# Patient Record
Sex: FEMALE | Race: WHITE | NOT HISPANIC OR LATINO | URBAN - NONMETROPOLITAN AREA
[De-identification: names, ages, dates, MRNs, and addresses within clinical notes are randomized per-mention and may not be internally consistent; named-entity substitution may affect disease eponyms.]

---

## 2021-12-08 PROCEDURE — U0004 COV-19 TEST NON-CDC HGH THRU: HCPCS | Performed by: NURSE PRACTITIONER

## 2022-02-07 ENCOUNTER — HOSPITAL ENCOUNTER (INPATIENT)
Facility: HOSPITAL | Age: 27
LOS: 30 days | Discharge: PSYCHIATRIC OR SUBSTANCE USE FACILITY | End: 2022-03-09
Attending: PSYCHIATRY & NEUROLOGY | Admitting: PSYCHIATRY & NEUROLOGY

## 2022-02-07 ENCOUNTER — HOSPITAL ENCOUNTER (EMERGENCY)
Facility: HOSPITAL | Age: 27
Discharge: PSYCHIATRIC OR SUBSTANCE USE FACILITY | End: 2022-02-07
Attending: EMERGENCY MEDICINE | Admitting: EMERGENCY MEDICINE

## 2022-02-07 VITALS
HEIGHT: 66 IN | BODY MASS INDEX: 17.72 KG/M2 | DIASTOLIC BLOOD PRESSURE: 78 MMHG | OXYGEN SATURATION: 99 % | SYSTOLIC BLOOD PRESSURE: 122 MMHG | WEIGHT: 110.23 LBS | TEMPERATURE: 98.6 F | HEART RATE: 82 BPM | RESPIRATION RATE: 18 BRPM

## 2022-02-07 DIAGNOSIS — F50.00 ANOREXIA NERVOSA: ICD-10-CM

## 2022-02-07 DIAGNOSIS — F25.1 SCHIZOAFFECTIVE DISORDER, DEPRESSIVE TYPE: Primary | ICD-10-CM

## 2022-02-07 DIAGNOSIS — R45.851 SUICIDAL IDEATION: Primary | ICD-10-CM

## 2022-02-07 PROBLEM — F32.A DEPRESSION WITH SUICIDAL IDEATION: Status: ACTIVE | Noted: 2022-02-07

## 2022-02-07 LAB
ALBUMIN SERPL-MCNC: 4.96 G/DL (ref 3.5–5.2)
ALBUMIN/GLOB SERPL: 1.7 G/DL
ALP SERPL-CCNC: 101 U/L (ref 39–117)
ALT SERPL W P-5'-P-CCNC: 9 U/L (ref 1–33)
ANION GAP SERPL CALCULATED.3IONS-SCNC: 13.4 MMOL/L (ref 5–15)
AST SERPL-CCNC: 15 U/L (ref 1–32)
BASOPHILS # BLD AUTO: 0.08 10*3/MM3 (ref 0–0.2)
BASOPHILS NFR BLD AUTO: 0.9 % (ref 0–1.5)
BILIRUB SERPL-MCNC: 0.9 MG/DL (ref 0–1.2)
BUN SERPL-MCNC: 9 MG/DL (ref 6–20)
BUN/CREAT SERPL: 11.8 (ref 7–25)
CALCIUM SPEC-SCNC: 9.9 MG/DL (ref 8.6–10.5)
CHLORIDE SERPL-SCNC: 100 MMOL/L (ref 98–107)
CO2 SERPL-SCNC: 25.6 MMOL/L (ref 22–29)
CREAT SERPL-MCNC: 0.76 MG/DL (ref 0.57–1)
DEPRECATED RDW RBC AUTO: 44.4 FL (ref 37–54)
EOSINOPHIL # BLD AUTO: 0.05 10*3/MM3 (ref 0–0.4)
EOSINOPHIL NFR BLD AUTO: 0.6 % (ref 0.3–6.2)
ERYTHROCYTE [DISTWIDTH] IN BLOOD BY AUTOMATED COUNT: 13.2 % (ref 12.3–15.4)
ETHANOL BLD-MCNC: <10 MG/DL (ref 0–10)
ETHANOL UR QL: <0.01 %
FLUAV RNA RESP QL NAA+PROBE: NOT DETECTED
FLUBV RNA RESP QL NAA+PROBE: NOT DETECTED
GFR SERPL CREATININE-BSD FRML MDRD: 93 ML/MIN/1.73
GLOBULIN UR ELPH-MCNC: 2.8 GM/DL
GLUCOSE SERPL-MCNC: 97 MG/DL (ref 65–99)
HCG SERPL QL: NEGATIVE
HCT VFR BLD AUTO: 41.9 % (ref 34–46.6)
HGB BLD-MCNC: 13.6 G/DL (ref 12–15.9)
IMM GRANULOCYTES # BLD AUTO: 0.02 10*3/MM3 (ref 0–0.05)
IMM GRANULOCYTES NFR BLD AUTO: 0.2 % (ref 0–0.5)
LYMPHOCYTES # BLD AUTO: 2.16 10*3/MM3 (ref 0.7–3.1)
LYMPHOCYTES NFR BLD AUTO: 24.2 % (ref 19.6–45.3)
MAGNESIUM SERPL-MCNC: 2.1 MG/DL (ref 1.6–2.6)
MCH RBC QN AUTO: 29.9 PG (ref 26.6–33)
MCHC RBC AUTO-ENTMCNC: 32.5 G/DL (ref 31.5–35.7)
MCV RBC AUTO: 92.1 FL (ref 79–97)
MONOCYTES # BLD AUTO: 0.56 10*3/MM3 (ref 0.1–0.9)
MONOCYTES NFR BLD AUTO: 6.3 % (ref 5–12)
NEUTROPHILS NFR BLD AUTO: 6.07 10*3/MM3 (ref 1.7–7)
NEUTROPHILS NFR BLD AUTO: 67.8 % (ref 42.7–76)
NRBC BLD AUTO-RTO: 0 /100 WBC (ref 0–0.2)
PLATELET # BLD AUTO: 270 10*3/MM3 (ref 140–450)
PMV BLD AUTO: 12.6 FL (ref 6–12)
POTASSIUM SERPL-SCNC: 3.9 MMOL/L (ref 3.5–5.2)
PROT SERPL-MCNC: 7.8 G/DL (ref 6–8.5)
RBC # BLD AUTO: 4.55 10*6/MM3 (ref 3.77–5.28)
SARS-COV-2 RNA RESP QL NAA+PROBE: NOT DETECTED
SODIUM SERPL-SCNC: 139 MMOL/L (ref 136–145)
WBC NRBC COR # BLD: 8.94 10*3/MM3 (ref 3.4–10.8)

## 2022-02-07 PROCEDURE — 82077 ASSAY SPEC XCP UR&BREATH IA: CPT | Performed by: PHYSICIAN ASSISTANT

## 2022-02-07 PROCEDURE — 83735 ASSAY OF MAGNESIUM: CPT | Performed by: PHYSICIAN ASSISTANT

## 2022-02-07 PROCEDURE — 36415 COLL VENOUS BLD VENIPUNCTURE: CPT

## 2022-02-07 PROCEDURE — 93005 ELECTROCARDIOGRAM TRACING: CPT | Performed by: PSYCHIATRY & NEUROLOGY

## 2022-02-07 PROCEDURE — 87636 SARSCOV2 & INF A&B AMP PRB: CPT | Performed by: PHYSICIAN ASSISTANT

## 2022-02-07 PROCEDURE — 85025 COMPLETE CBC W/AUTO DIFF WBC: CPT | Performed by: PHYSICIAN ASSISTANT

## 2022-02-07 PROCEDURE — 99284 EMERGENCY DEPT VISIT MOD MDM: CPT

## 2022-02-07 PROCEDURE — 84703 CHORIONIC GONADOTROPIN ASSAY: CPT | Performed by: PSYCHIATRY & NEUROLOGY

## 2022-02-07 PROCEDURE — C9803 HOPD COVID-19 SPEC COLLECT: HCPCS

## 2022-02-07 PROCEDURE — 80053 COMPREHEN METABOLIC PANEL: CPT | Performed by: PHYSICIAN ASSISTANT

## 2022-02-07 RX ORDER — TRAZODONE HYDROCHLORIDE 50 MG/1
50 TABLET ORAL NIGHTLY PRN
Status: DISCONTINUED | OUTPATIENT
Start: 2022-02-07 | End: 2022-03-09 | Stop reason: HOSPADM

## 2022-02-07 RX ORDER — ACETAMINOPHEN 325 MG/1
650 TABLET ORAL EVERY 6 HOURS PRN
Status: DISCONTINUED | OUTPATIENT
Start: 2022-02-07 | End: 2022-03-09 | Stop reason: HOSPADM

## 2022-02-07 RX ORDER — BENZONATATE 100 MG/1
100 CAPSULE ORAL 3 TIMES DAILY PRN
Status: DISCONTINUED | OUTPATIENT
Start: 2022-02-07 | End: 2022-03-09 | Stop reason: HOSPADM

## 2022-02-07 RX ORDER — BENZTROPINE MESYLATE 1 MG/1
2 TABLET ORAL ONCE AS NEEDED
Status: DISCONTINUED | OUTPATIENT
Start: 2022-02-07 | End: 2022-03-09 | Stop reason: HOSPADM

## 2022-02-07 RX ORDER — LOPERAMIDE HYDROCHLORIDE 2 MG/1
2 CAPSULE ORAL
Status: DISCONTINUED | OUTPATIENT
Start: 2022-02-07 | End: 2022-03-09 | Stop reason: HOSPADM

## 2022-02-07 RX ORDER — ECHINACEA PURPUREA EXTRACT 125 MG
2 TABLET ORAL AS NEEDED
Status: DISCONTINUED | OUTPATIENT
Start: 2022-02-07 | End: 2022-03-09 | Stop reason: HOSPADM

## 2022-02-07 RX ORDER — HYDROXYZINE 50 MG/1
50 TABLET, FILM COATED ORAL EVERY 6 HOURS PRN
Status: DISCONTINUED | OUTPATIENT
Start: 2022-02-07 | End: 2022-03-09 | Stop reason: HOSPADM

## 2022-02-07 RX ORDER — ONDANSETRON 4 MG/1
4 TABLET, FILM COATED ORAL EVERY 6 HOURS PRN
Status: DISCONTINUED | OUTPATIENT
Start: 2022-02-07 | End: 2022-03-09 | Stop reason: HOSPADM

## 2022-02-07 RX ORDER — BENZTROPINE MESYLATE 1 MG/ML
1 INJECTION INTRAMUSCULAR; INTRAVENOUS ONCE AS NEEDED
Status: DISCONTINUED | OUTPATIENT
Start: 2022-02-07 | End: 2022-03-09 | Stop reason: HOSPADM

## 2022-02-07 RX ORDER — IBUPROFEN 400 MG/1
400 TABLET ORAL EVERY 6 HOURS PRN
Status: DISCONTINUED | OUTPATIENT
Start: 2022-02-07 | End: 2022-03-09 | Stop reason: HOSPADM

## 2022-02-07 RX ORDER — FAMOTIDINE 20 MG/1
20 TABLET, FILM COATED ORAL 2 TIMES DAILY PRN
Status: DISCONTINUED | OUTPATIENT
Start: 2022-02-07 | End: 2022-03-09 | Stop reason: HOSPADM

## 2022-02-07 RX ORDER — ALUMINA, MAGNESIA, AND SIMETHICONE 2400; 2400; 240 MG/30ML; MG/30ML; MG/30ML
15 SUSPENSION ORAL EVERY 6 HOURS PRN
Status: DISCONTINUED | OUTPATIENT
Start: 2022-02-07 | End: 2022-03-09 | Stop reason: HOSPADM

## 2022-02-08 PROBLEM — F25.1 SCHIZOAFFECTIVE DISORDER, DEPRESSIVE TYPE: Status: ACTIVE | Noted: 2022-02-07

## 2022-02-08 LAB — TSH SERPL DL<=0.05 MIU/L-ACNC: 0.62 UIU/ML (ref 0.27–4.2)

## 2022-02-08 PROCEDURE — 63710000001 ONDANSETRON PER 8 MG: Performed by: PSYCHIATRY & NEUROLOGY

## 2022-02-08 PROCEDURE — 99223 1ST HOSP IP/OBS HIGH 75: CPT | Performed by: PSYCHIATRY & NEUROLOGY

## 2022-02-08 PROCEDURE — 84443 ASSAY THYROID STIM HORMONE: CPT | Performed by: PSYCHIATRY & NEUROLOGY

## 2022-02-08 RX ORDER — OLANZAPINE 10 MG/1
10 TABLET ORAL DAILY
Status: DISCONTINUED | OUTPATIENT
Start: 2022-02-08 | End: 2022-02-10

## 2022-02-08 RX ORDER — OLANZAPINE 5 MG/1
10 TABLET, ORALLY DISINTEGRATING ORAL DAILY
Status: DISCONTINUED | OUTPATIENT
Start: 2022-02-08 | End: 2022-02-08

## 2022-02-08 RX ORDER — FLUOXETINE HYDROCHLORIDE 20 MG/1
20 CAPSULE ORAL DAILY
Status: DISCONTINUED | OUTPATIENT
Start: 2022-02-08 | End: 2022-03-07

## 2022-02-08 RX ADMIN — FLUOXETINE HYDROCHLORIDE 20 MG: 20 CAPSULE ORAL at 13:20

## 2022-02-09 PROCEDURE — 99232 SBSQ HOSP IP/OBS MODERATE 35: CPT | Performed by: PSYCHIATRY & NEUROLOGY

## 2022-02-10 LAB
AMPHET+METHAMPHET UR QL: NEGATIVE
AMPHETAMINES UR QL: NEGATIVE
B-HCG UR QL: NEGATIVE
BARBITURATES UR QL SCN: NEGATIVE
BENZODIAZ UR QL SCN: NEGATIVE
BILIRUB UR QL STRIP: ABNORMAL
BUPRENORPHINE SERPL-MCNC: NEGATIVE NG/ML
CANNABINOIDS SERPL QL: NEGATIVE
CLARITY UR: ABNORMAL
COCAINE UR QL: NEGATIVE
COLOR UR: ABNORMAL
GLUCOSE UR STRIP-MCNC: NEGATIVE MG/DL
HGB UR QL STRIP.AUTO: NEGATIVE
KETONES UR QL STRIP: ABNORMAL
LEUKOCYTE ESTERASE UR QL STRIP.AUTO: NEGATIVE
METHADONE UR QL SCN: NEGATIVE
NITRITE UR QL STRIP: NEGATIVE
OPIATES UR QL: NEGATIVE
OXYCODONE UR QL SCN: NEGATIVE
PCP UR QL SCN: NEGATIVE
PH UR STRIP.AUTO: 6 [PH] (ref 5–8)
PROPOXYPH UR QL: NEGATIVE
PROT UR QL STRIP: ABNORMAL
SP GR UR STRIP: >=1.03 (ref 1–1.03)
TRICYCLICS UR QL SCN: NEGATIVE
UROBILINOGEN UR QL STRIP: ABNORMAL

## 2022-02-10 PROCEDURE — 81003 URINALYSIS AUTO W/O SCOPE: CPT | Performed by: PSYCHIATRY & NEUROLOGY

## 2022-02-10 PROCEDURE — 25010000002 LORAZEPAM PER 2 MG

## 2022-02-10 PROCEDURE — 99233 SBSQ HOSP IP/OBS HIGH 50: CPT | Performed by: PSYCHIATRY & NEUROLOGY

## 2022-02-10 PROCEDURE — 81025 URINE PREGNANCY TEST: CPT | Performed by: PSYCHIATRY & NEUROLOGY

## 2022-02-10 PROCEDURE — 80306 DRUG TEST PRSMV INSTRMNT: CPT | Performed by: PSYCHIATRY & NEUROLOGY

## 2022-02-10 RX ORDER — LORAZEPAM 2 MG/ML
INJECTION INTRAMUSCULAR
Status: COMPLETED
Start: 2022-02-10 | End: 2022-02-10

## 2022-02-10 RX ORDER — LORAZEPAM 2 MG/ML
1 INJECTION INTRAMUSCULAR ONCE
Status: COMPLETED | OUTPATIENT
Start: 2022-02-10 | End: 2022-02-10

## 2022-02-10 RX ORDER — ARIPIPRAZOLE 10 MG/1
5 TABLET ORAL DAILY
Status: DISCONTINUED | OUTPATIENT
Start: 2022-02-10 | End: 2022-02-19

## 2022-02-10 RX ADMIN — FLUOXETINE HYDROCHLORIDE 20 MG: 20 CAPSULE ORAL at 17:22

## 2022-02-10 RX ADMIN — LORAZEPAM 1 MG: 2 INJECTION INTRAMUSCULAR at 13:54

## 2022-02-10 RX ADMIN — ARIPIPRAZOLE 5 MG: 10 TABLET ORAL at 17:23

## 2022-02-10 RX ADMIN — LORAZEPAM 1 MG: 2 INJECTION, SOLUTION INTRAMUSCULAR; INTRAVENOUS at 13:54

## 2022-02-11 PROCEDURE — 99232 SBSQ HOSP IP/OBS MODERATE 35: CPT | Performed by: PSYCHIATRY & NEUROLOGY

## 2022-02-11 PROCEDURE — 25010000002 LORAZEPAM PER 2 MG: Performed by: PSYCHIATRY & NEUROLOGY

## 2022-02-11 RX ORDER — LORAZEPAM 0.5 MG/1
0.5 TABLET ORAL DAILY PRN
Status: DISCONTINUED | OUTPATIENT
Start: 2022-02-11 | End: 2022-02-12

## 2022-02-11 RX ORDER — LORAZEPAM 2 MG/ML
0.5 INJECTION INTRAMUSCULAR ONCE
Status: DISCONTINUED | OUTPATIENT
Start: 2022-02-11 | End: 2022-02-16

## 2022-02-11 RX ORDER — LORAZEPAM 2 MG/ML
0.5 INJECTION INTRAMUSCULAR DAILY PRN
Status: DISCONTINUED | OUTPATIENT
Start: 2022-02-11 | End: 2022-02-12

## 2022-02-11 RX ADMIN — LORAZEPAM 0.5 MG: 2 INJECTION, SOLUTION INTRAMUSCULAR; INTRAVENOUS at 22:19

## 2022-02-12 LAB
ANION GAP SERPL CALCULATED.3IONS-SCNC: 12.2 MMOL/L (ref 5–15)
BUN SERPL-MCNC: 22 MG/DL (ref 6–20)
BUN/CREAT SERPL: 24.4 (ref 7–25)
CALCIUM SPEC-SCNC: 9.4 MG/DL (ref 8.6–10.5)
CHLORIDE SERPL-SCNC: 104 MMOL/L (ref 98–107)
CO2 SERPL-SCNC: 26.8 MMOL/L (ref 22–29)
CREAT SERPL-MCNC: 0.9 MG/DL (ref 0.57–1)
GFR SERPL CREATININE-BSD FRML MDRD: 77 ML/MIN/1.73
GLUCOSE SERPL-MCNC: 109 MG/DL (ref 65–99)
POTASSIUM SERPL-SCNC: 3.8 MMOL/L (ref 3.5–5.2)
QT INTERVAL: 396 MS
QTC INTERVAL: 436 MS
SODIUM SERPL-SCNC: 143 MMOL/L (ref 136–145)

## 2022-02-12 PROCEDURE — 99232 SBSQ HOSP IP/OBS MODERATE 35: CPT | Performed by: PSYCHIATRY & NEUROLOGY

## 2022-02-12 PROCEDURE — 80048 BASIC METABOLIC PNL TOTAL CA: CPT | Performed by: PSYCHIATRY & NEUROLOGY

## 2022-02-12 RX ORDER — LORAZEPAM 2 MG/ML
0.5 INJECTION INTRAMUSCULAR EVERY 6 HOURS
Status: DISCONTINUED | OUTPATIENT
Start: 2022-02-12 | End: 2022-02-12

## 2022-02-12 RX ORDER — LORAZEPAM 1 MG/1
1 TABLET ORAL EVERY 6 HOURS
Status: DISCONTINUED | OUTPATIENT
Start: 2022-02-12 | End: 2022-02-16

## 2022-02-12 RX ORDER — LORAZEPAM 1 MG/1
1 TABLET ORAL EVERY 6 HOURS
Status: DISCONTINUED | OUTPATIENT
Start: 2022-02-12 | End: 2022-02-12

## 2022-02-12 RX ORDER — LORAZEPAM 2 MG/ML
1 INJECTION INTRAMUSCULAR EVERY 6 HOURS
Status: DISCONTINUED | OUTPATIENT
Start: 2022-02-12 | End: 2022-02-16

## 2022-02-12 RX ADMIN — LORAZEPAM 1 MG: 1 TABLET ORAL at 16:43

## 2022-02-13 PROCEDURE — 99232 SBSQ HOSP IP/OBS MODERATE 35: CPT | Performed by: PSYCHIATRY & NEUROLOGY

## 2022-02-13 PROCEDURE — 99222 1ST HOSP IP/OBS MODERATE 55: CPT | Performed by: INTERNAL MEDICINE

## 2022-02-13 RX ORDER — SODIUM CHLORIDE 0.9 % (FLUSH) 0.9 %
10 SYRINGE (ML) INJECTION AS NEEDED
Status: DISCONTINUED | OUTPATIENT
Start: 2022-02-13 | End: 2022-03-09 | Stop reason: HOSPADM

## 2022-02-14 PROCEDURE — 99231 SBSQ HOSP IP/OBS SF/LOW 25: CPT | Performed by: INTERNAL MEDICINE

## 2022-02-14 PROCEDURE — 99233 SBSQ HOSP IP/OBS HIGH 50: CPT | Performed by: PSYCHIATRY & NEUROLOGY

## 2022-02-15 PROCEDURE — 25010000002 LORAZEPAM PER 2 MG: Performed by: PSYCHIATRY & NEUROLOGY

## 2022-02-15 PROCEDURE — 99233 SBSQ HOSP IP/OBS HIGH 50: CPT | Performed by: PSYCHIATRY & NEUROLOGY

## 2022-02-15 RX ADMIN — LORAZEPAM 1 MG: 2 INJECTION INTRAMUSCULAR; INTRAVENOUS at 17:01

## 2022-02-16 PROBLEM — F33.2 SEVERE EPISODE OF RECURRENT MAJOR DEPRESSIVE DISORDER, WITHOUT PSYCHOTIC FEATURES: Status: ACTIVE | Noted: 2022-02-16

## 2022-02-16 PROBLEM — F50.00 ANOREXIA NERVOSA: Status: ACTIVE | Noted: 2022-02-16

## 2022-02-16 PROCEDURE — 99231 SBSQ HOSP IP/OBS SF/LOW 25: CPT | Performed by: PHYSICIAN ASSISTANT

## 2022-02-16 PROCEDURE — 99233 SBSQ HOSP IP/OBS HIGH 50: CPT | Performed by: PSYCHIATRY & NEUROLOGY

## 2022-02-16 RX ORDER — LORAZEPAM 2 MG/1
2 TABLET ORAL DAILY
Status: DISCONTINUED | OUTPATIENT
Start: 2022-02-17 | End: 2022-02-18

## 2022-02-16 RX ADMIN — ARIPIPRAZOLE 5 MG: 10 TABLET ORAL at 10:44

## 2022-02-16 RX ADMIN — LORAZEPAM 1 MG: 1 TABLET ORAL at 09:11

## 2022-02-16 RX ADMIN — FLUOXETINE HYDROCHLORIDE 20 MG: 20 CAPSULE ORAL at 10:44

## 2022-02-17 PROCEDURE — 99233 SBSQ HOSP IP/OBS HIGH 50: CPT | Performed by: PSYCHIATRY & NEUROLOGY

## 2022-02-17 RX ADMIN — ARIPIPRAZOLE 5 MG: 10 TABLET ORAL at 08:08

## 2022-02-17 RX ADMIN — LORAZEPAM 2 MG: 2 TABLET ORAL at 08:08

## 2022-02-17 RX ADMIN — FLUOXETINE HYDROCHLORIDE 20 MG: 20 CAPSULE ORAL at 08:08

## 2022-02-18 PROCEDURE — 99232 SBSQ HOSP IP/OBS MODERATE 35: CPT | Performed by: PSYCHIATRY & NEUROLOGY

## 2022-02-18 RX ADMIN — FLUOXETINE HYDROCHLORIDE 20 MG: 20 CAPSULE ORAL at 09:18

## 2022-02-18 RX ADMIN — ARIPIPRAZOLE 5 MG: 10 TABLET ORAL at 09:18

## 2022-02-19 PROCEDURE — 99233 SBSQ HOSP IP/OBS HIGH 50: CPT | Performed by: PSYCHIATRY & NEUROLOGY

## 2022-02-19 RX ORDER — ARIPIPRAZOLE 10 MG/1
5 TABLET ORAL ONCE
Status: DISCONTINUED | OUTPATIENT
Start: 2022-02-19 | End: 2022-02-22

## 2022-02-19 RX ORDER — ARIPIPRAZOLE 10 MG/1
10 TABLET ORAL DAILY
Status: DISCONTINUED | OUTPATIENT
Start: 2022-02-20 | End: 2022-02-22

## 2022-02-20 PROCEDURE — 99232 SBSQ HOSP IP/OBS MODERATE 35: CPT | Performed by: PSYCHIATRY & NEUROLOGY

## 2022-02-20 PROCEDURE — 25010000002 LORAZEPAM PER 2 MG: Performed by: PSYCHIATRY & NEUROLOGY

## 2022-02-20 RX ORDER — LORAZEPAM 2 MG/ML
2 INJECTION INTRAMUSCULAR ONCE
Status: COMPLETED | OUTPATIENT
Start: 2022-02-20 | End: 2022-02-20

## 2022-02-20 RX ORDER — LORAZEPAM 2 MG/1
2 TABLET ORAL ONCE
Status: DISCONTINUED | OUTPATIENT
Start: 2022-02-20 | End: 2022-02-21

## 2022-02-20 RX ADMIN — LORAZEPAM 2 MG: 2 INJECTION INTRAMUSCULAR; INTRAVENOUS at 15:51

## 2022-02-21 PROCEDURE — 99232 SBSQ HOSP IP/OBS MODERATE 35: CPT | Performed by: PSYCHIATRY & NEUROLOGY

## 2022-02-21 RX ORDER — LORAZEPAM 2 MG/1
2 TABLET ORAL EVERY 8 HOURS SCHEDULED
Status: DISCONTINUED | OUTPATIENT
Start: 2022-02-21 | End: 2022-02-22

## 2022-02-21 RX ORDER — LORAZEPAM 2 MG/1
2 TABLET ORAL ONCE AS NEEDED
Status: DISCONTINUED | OUTPATIENT
Start: 2022-02-21 | End: 2022-02-22

## 2022-02-21 RX ADMIN — LORAZEPAM 2 MG: 2 TABLET ORAL at 13:34

## 2022-02-21 RX ADMIN — FLUOXETINE HYDROCHLORIDE 20 MG: 20 CAPSULE ORAL at 09:12

## 2022-02-21 RX ADMIN — LORAZEPAM 2 MG: 2 TABLET ORAL at 21:07

## 2022-02-22 PROCEDURE — 90836 PSYTX W PT W E/M 45 MIN: CPT | Performed by: PSYCHIATRY & NEUROLOGY

## 2022-02-22 PROCEDURE — 99232 SBSQ HOSP IP/OBS MODERATE 35: CPT | Performed by: PSYCHIATRY & NEUROLOGY

## 2022-02-22 RX ORDER — LORAZEPAM 2 MG/1
2 TABLET ORAL EVERY 8 HOURS PRN
Status: DISCONTINUED | OUTPATIENT
Start: 2022-02-22 | End: 2022-02-24

## 2022-02-22 RX ADMIN — ARIPIPRAZOLE 10 MG: 10 TABLET ORAL at 09:18

## 2022-02-22 RX ADMIN — FLUOXETINE HYDROCHLORIDE 20 MG: 20 CAPSULE ORAL at 09:18

## 2022-02-22 RX ADMIN — LORAZEPAM 2 MG: 2 TABLET ORAL at 06:14

## 2022-02-23 PROCEDURE — 99232 SBSQ HOSP IP/OBS MODERATE 35: CPT | Performed by: PSYCHIATRY & NEUROLOGY

## 2022-02-23 RX ADMIN — FLUOXETINE HYDROCHLORIDE 20 MG: 20 CAPSULE ORAL at 09:13

## 2022-02-24 PROCEDURE — 99232 SBSQ HOSP IP/OBS MODERATE 35: CPT | Performed by: PSYCHIATRY & NEUROLOGY

## 2022-02-24 PROCEDURE — 25010000002 LORAZEPAM PER 2 MG: Performed by: PSYCHIATRY & NEUROLOGY

## 2022-02-24 RX ORDER — LORAZEPAM 2 MG/ML
2 INJECTION INTRAMUSCULAR ONCE
Status: COMPLETED | OUTPATIENT
Start: 2022-02-24 | End: 2022-02-24

## 2022-02-24 RX ORDER — LORAZEPAM 2 MG/1
2 TABLET ORAL 3 TIMES DAILY
Status: DISCONTINUED | OUTPATIENT
Start: 2022-02-24 | End: 2022-03-01

## 2022-02-24 RX ADMIN — LORAZEPAM 2 MG: 2 INJECTION INTRAMUSCULAR at 13:37

## 2022-02-24 RX ADMIN — FLUOXETINE HYDROCHLORIDE 20 MG: 20 CAPSULE ORAL at 09:24

## 2022-02-25 LAB
ALBUMIN SERPL-MCNC: 4.16 G/DL (ref 3.5–5.2)
ALBUMIN/GLOB SERPL: 2 G/DL
ALP SERPL-CCNC: 67 U/L (ref 39–117)
ALT SERPL W P-5'-P-CCNC: 8 U/L (ref 1–33)
ANION GAP SERPL CALCULATED.3IONS-SCNC: 10.8 MMOL/L (ref 5–15)
AST SERPL-CCNC: 21 U/L (ref 1–32)
BILIRUB SERPL-MCNC: 0.4 MG/DL (ref 0–1.2)
BUN SERPL-MCNC: 11 MG/DL (ref 6–20)
BUN/CREAT SERPL: 13.6 (ref 7–25)
CALCIUM SPEC-SCNC: 8.7 MG/DL (ref 8.6–10.5)
CHLORIDE SERPL-SCNC: 104 MMOL/L (ref 98–107)
CO2 SERPL-SCNC: 27.2 MMOL/L (ref 22–29)
CREAT SERPL-MCNC: 0.81 MG/DL (ref 0.57–1)
GFR SERPL CREATININE-BSD FRML MDRD: 87 ML/MIN/1.73
GLOBULIN UR ELPH-MCNC: 2 GM/DL
GLUCOSE SERPL-MCNC: 110 MG/DL (ref 65–99)
PHOSPHATE SERPL-MCNC: 2.2 MG/DL (ref 2.5–4.5)
POTASSIUM SERPL-SCNC: 3.5 MMOL/L (ref 3.5–5.2)
PROT SERPL-MCNC: 6.2 G/DL (ref 6–8.5)
SODIUM SERPL-SCNC: 142 MMOL/L (ref 136–145)

## 2022-02-25 PROCEDURE — 80053 COMPREHEN METABOLIC PANEL: CPT | Performed by: PSYCHIATRY & NEUROLOGY

## 2022-02-25 PROCEDURE — 25010000002 LORAZEPAM PER 2 MG: Performed by: PSYCHIATRY & NEUROLOGY

## 2022-02-25 PROCEDURE — 84100 ASSAY OF PHOSPHORUS: CPT | Performed by: PSYCHIATRY & NEUROLOGY

## 2022-02-25 PROCEDURE — 93005 ELECTROCARDIOGRAM TRACING: CPT | Performed by: PSYCHIATRY & NEUROLOGY

## 2022-02-25 PROCEDURE — 84134 ASSAY OF PREALBUMIN: CPT | Performed by: PSYCHIATRY & NEUROLOGY

## 2022-02-25 PROCEDURE — 99232 SBSQ HOSP IP/OBS MODERATE 35: CPT | Performed by: PSYCHIATRY & NEUROLOGY

## 2022-02-25 PROCEDURE — 93010 ELECTROCARDIOGRAM REPORT: CPT | Performed by: INTERNAL MEDICINE

## 2022-02-25 RX ORDER — LORAZEPAM 2 MG/ML
2 INJECTION INTRAMUSCULAR EVERY 8 HOURS PRN
Status: DISCONTINUED | OUTPATIENT
Start: 2022-02-25 | End: 2022-03-09 | Stop reason: HOSPADM

## 2022-02-25 RX ORDER — LORAZEPAM 2 MG/ML
2 INJECTION INTRAMUSCULAR ONCE
Status: COMPLETED | OUTPATIENT
Start: 2022-02-25 | End: 2022-02-25

## 2022-02-25 RX ADMIN — FLUOXETINE HYDROCHLORIDE 20 MG: 20 CAPSULE ORAL at 08:06

## 2022-02-25 RX ADMIN — LORAZEPAM 2 MG: 2 TABLET ORAL at 20:18

## 2022-02-25 RX ADMIN — LORAZEPAM 2 MG: 2 INJECTION INTRAMUSCULAR at 10:47

## 2022-02-25 RX ADMIN — LORAZEPAM 2 MG: 2 TABLET ORAL at 15:12

## 2022-02-26 LAB
PREALB SERPL-MCNC: 20.8 MG/DL (ref 20–40)
QT INTERVAL: 390 MS
QTC INTERVAL: 417 MS

## 2022-02-26 PROCEDURE — 99231 SBSQ HOSP IP/OBS SF/LOW 25: CPT | Performed by: PSYCHIATRY & NEUROLOGY

## 2022-02-26 RX ADMIN — LORAZEPAM 2 MG: 2 TABLET ORAL at 15:03

## 2022-02-26 RX ADMIN — LORAZEPAM 2 MG: 2 TABLET ORAL at 08:13

## 2022-02-26 RX ADMIN — FLUOXETINE HYDROCHLORIDE 20 MG: 20 CAPSULE ORAL at 08:13

## 2022-02-27 PROCEDURE — 99231 SBSQ HOSP IP/OBS SF/LOW 25: CPT | Performed by: PSYCHIATRY & NEUROLOGY

## 2022-02-27 PROCEDURE — 25010000002 LORAZEPAM PER 2 MG: Performed by: PSYCHIATRY & NEUROLOGY

## 2022-02-27 RX ADMIN — LORAZEPAM 2 MG: 2 INJECTION INTRAMUSCULAR at 21:40

## 2022-02-27 RX ADMIN — LORAZEPAM 2 MG: 2 TABLET ORAL at 10:24

## 2022-02-27 RX ADMIN — LORAZEPAM 2 MG: 2 TABLET ORAL at 16:43

## 2022-02-27 RX ADMIN — FLUOXETINE HYDROCHLORIDE 20 MG: 20 CAPSULE ORAL at 08:45

## 2022-02-28 PROCEDURE — 99233 SBSQ HOSP IP/OBS HIGH 50: CPT | Performed by: PSYCHIATRY & NEUROLOGY

## 2022-02-28 RX ADMIN — LORAZEPAM 2 MG: 2 TABLET ORAL at 10:22

## 2022-02-28 RX ADMIN — FLUOXETINE HYDROCHLORIDE 20 MG: 20 CAPSULE ORAL at 10:22

## 2022-02-28 RX ADMIN — LORAZEPAM 2 MG: 2 TABLET ORAL at 23:02

## 2022-02-28 RX ADMIN — LORAZEPAM 2 MG: 2 TABLET ORAL at 15:59

## 2022-03-01 PROCEDURE — 99233 SBSQ HOSP IP/OBS HIGH 50: CPT | Performed by: PSYCHIATRY & NEUROLOGY

## 2022-03-01 RX ORDER — LORAZEPAM 1 MG/1
1 TABLET ORAL 3 TIMES DAILY
Status: DISCONTINUED | OUTPATIENT
Start: 2022-03-01 | End: 2022-03-09 | Stop reason: HOSPADM

## 2022-03-01 RX ADMIN — FLUOXETINE HYDROCHLORIDE 20 MG: 20 CAPSULE ORAL at 10:53

## 2022-03-01 RX ADMIN — LORAZEPAM 1 MG: 1 TABLET ORAL at 16:45

## 2022-03-01 RX ADMIN — LORAZEPAM 2 MG: 2 TABLET ORAL at 10:53

## 2022-03-01 NOTE — PLAN OF CARE
Problem: Adult Behavioral Health Plan of Care  Goal: Plan of Care Review  Outcome: Ongoing, Progressing  Flowsheets  Taken 3/1/2022 1243  Progress: improving  Plan of Care Reviewed With: patient  Patient Agreement with Plan of Care: agrees  Outcome Summary: Patient has shown some cooperation with staff, at times. Patient encouraged to eat her meals and come into day room with others. Patient have been given support and encouragement. Patient was unable to complete assessment due to mental status. Will continue to monitor.  Taken 3/1/2022 0833  Plan of Care Reviewed With: patient  Patient Agreement with Plan of Care: agrees   Goal Outcome Evaluation:  Plan of Care Reviewed With: patient  Patient Agreement with Plan of Care: agrees     Progress: improving  Outcome Summary: Patient has shown some cooperation with staff, at times. Patient encouraged to eat her meals and come into day room with others. Patient have been given support and encouragement. Patient was unable to complete assessment due to mental status. Will continue to monitor.

## 2022-03-01 NOTE — PLAN OF CARE
Problem: Adult Behavioral Health Plan of Care  Goal: Optimized Coping Skills in Response to Life Stressors  Outcome: Ongoing, Progressing  Intervention: Promote Effective Coping Strategies  Flowsheets (Taken 3/1/2022 1110)  Supportive Measures:   active listening utilized   counseling provided   decision-making supported   goal setting facilitated   positive reinforcement provided   self-care encouraged   verbalization of feelings encouraged   relaxation techniques promoted   self-responsibility promoted   problem-solving facilitated   self-reflection promoted  Goal: Develops/Participates in Therapeutic [place] to Support Successful Transition  Outcome: Ongoing, Progressing  Intervention: Foster Therapeutic [place]  Flowsheets (Taken 3/1/2022 0833 by Mirlande Fajardo, RN)  Trust Relationship/Rapport:   care explained   choices provided   empathic listening provided   questions encouraged   reassurance provided   thoughts/feelings acknowledged  Intervention: Mutually Develop Transition Plan  Flowsheets  Taken 2/23/2022 1117 by Tiara Nance  Patient's Choice of Community Agency(s): [facility]  Taken 2/16/2022 1128 by Tiara Nance  Transition Support:   community resources reviewed   crisis management plan promoted   crisis management plan verbalized   follow-up care discussed   follow-up care coordinated  Taken 2/8/2022 1133 by Tiara Nance  Outpatient/Agency/Support Group Needs:   outpatient counseling   outpatient medication management   outpatient psychiatric care (specify)  Discharge Coordination/Progress: Patient has insurance and will not need transportation. Patient unable to participate in discussion about outpatient care due to mental status.  Anticipated Discharge Disposition: home with family  Transportation Concerns: car, none  Current Discharge Risk: psychiatric illness  Concerns to be Addressed:   mental health   suicidal   adjustment to diagnosis/illness    coping/stress  Readmission Within the Last 30 Days: no previous admission in last 30 days  Taken 2/7/2022 1511 by Elen Barajas, RN  Transportation Anticipated: family or friend will provide  Patient/Family Anticipated Services at Transition:   outpatient care   mental health services  Patient/Family Anticipates Transition to: home with family   Goal Outcome Evaluation:   Consent Given to Review Plan with: Therapist spoke with patient's mother, Zahra  Progress: improving  Outcome Summary: Therapist met with patient to review plan of care; patient unable to participate.       DATA:      Therapist discussed case with Dr. Beebe and met with patient today to review coping skills, review plan of care, and discuss discharge.    Therapist facilitated family session with patient, Zahra, and Greg. Family session was very successful and patient reports feeling better afterwards. Zahra and Greg plan to have more frequent phone calls together with the patient so she can talk to them both. Patient indicated to her parents that she wants to be closer with them and they were receptive. She indicated family therapy is something she is interested in and that today's family session felt like progress. Patient reports she wants to improve her communication skills and add more people to her support system, possibly her brother Daniel. Patient appears to have rapport with both parents and reports trusting them, this therapist, and therapist's intern as her treatment team. Zahra and Greg were educated on ECT and are both agreeable and supportive if patient decides she wants this treatment. Patient is continuing to consider it as an option.      Clinical Maneuvering/Intervention:     Therapist assisted patient in processing above session content; acknowledged and normalized patient’s thoughts, feelings, and concerns.  Discussed the therapist/patient relationship and explain the parameters and limitations of  relative confidentiality.  Also discussed the importance of active participation, and honesty to the treatment process.  Encouraged the patient to discuss/vent their feelings, frustrations, and fears concerning their ongoing medical issues and validated their feelings.     Allowed patient to freely discuss issues without interruption or judgment. Provided safe, confidential environment to facilitate the development of positive therapeutic relationship and encourage open, honest communication.      Therapist addressed discharge safety planning this date. Assisted patient in identifying risk factors which would indicate the need for higher level of care after discharge;  including thoughts to harm self or others and/or self-harming behavior. Encouraged patient to call 911, or present to the nearest emergency room should any of these events occur. Discussed crisis intervention services and means to access.  Encouraged securing any objects of harm.    Therapist completed integrated summary, treatment plan, and initiated social history this date.  Therapist is strongly encouraging family involvement in treatment.       Encouraged mask wearing, social distancing, and regular hand washing due to COVID19 risk.      ASSESSMENT:      Therapist met 1:1 with patient today. Patient initially presented as restricted and withdrawn during assessment; however, appeared to improve throughout the family session. She reports it feels nice to know how supported she is. Patient's affect appeared brighter by the end of the family session and she reports feeling more hopeful for the future. Patient has significant insight into her situation, though continues to experience cognitive distortions and negativistic thoughts related to body image. Both appear to be improving slowly.      PLAN:       Patient to remain hospitalized this date.      Treatment team will focus efforts on stabilizing patient's acute symptoms while providing education  on healthy coping and crisis management to reduce hospitalizations.   Patient requires daily psychiatrist evaluation and 24/7 nursing supervision to promote patient  safety.     Therapist will offer 1-4 individual sessions, 1 therapy group daily, family education, and appropriate referral.

## 2022-03-01 NOTE — PLAN OF CARE
"Goal Outcome Evaluation:  Plan of Care Reviewed With: patient  Patient Agreement with Plan of Care: agrees        Pt refused to answer assessment questions. She stated \"I do not feel like talking right now.\" I asked her to please let me know when she did. Pt did not and did not come out for snacks or drinks. She went to bed early this evening.   "

## 2022-03-01 NOTE — PROGRESS NOTES
INPATIENT PSYCHIATRIC PROGRESS NOTE    Name:  Kingsley Grimm  :  1995  MRN:  [id number]  Visit Number:  [id number]  Length of stay:  22    Behavioral Health Treatment Plan and Problem List: I have reviewed and approved the Behavioral Health Treatment Plan and Problem list.    SUBJECTIVE    CC/ Focus of exam: depression/ anorexia/ possible psychosis.     Patient's subjective status: mute for this interview.     INTERVAL HISTORY: Has been a busy morning for the patient having participated in a family session with the therapist, will refer to her note.  Apparently the patient became more interactive and affect at least less restrictive talking some regard regarding her future and the treatment options.  Subsequent interview separately was of little benefit due to the patient's resumption of the semicatatonic state.  Therapist continues to have the best relationship and communication with the patient.    Current treatment plan:  Patient is taking the Prozac on a fairly consistent basis, has been refusing Abilify.  Addressed the use of Abilify as an adjunct to the antidepressant with the patient and encouraged her to reconsider her objections.  No response from the patient.  Again recommended ECT as probably the best most efficacious treatment option for this patient.  Patient has made no decision, parents have apparently endorsed this option.    We understand that the 14th day limitation on forcing treatment expires today, thus if the patient resumes opposition to the at least the oral medications and behavioral efforts will once again have to consider commitment to the [facility] where court order could be obtained to treat.    Depression rating no comment, unable to rate   anxiety rating unable to rate  Sleep: Adequate         ROS: No cardiovascular, GI, or Neurological complaints.       OBJECTIVE    Vitals:    22 0730   BP: 95/65   Pulse: 69   Resp: 18   Temp: 97.5 °F (36.4 °C)   SpO2: 99%       Temp:  [97.5 °F (36.4 °C)-97.6 °F (36.4 °C)] 97.5 °F (36.4 °C)  Heart Rate:  [69-83] 69  Resp:  [18] 18  BP: ()/(63-65) 95/65    MENTAL STATUS EXAM:         Psychomotor: No psychomotor agitation/retardation, No EPS, No motor tics  Speech-normal rate, amount.  Mood/Affect:  Restricted  Thought Processes:  concrete  Thought Content:  mood congruent  Hallucination(s): none  Hopelessness: Unable to rate  Optimistic:No  Suicidal Thoughts:   Unable to rate  Suicidal Plan/Intent: Probably not  Homicidal Thoughts:  absent  Orientation: Place and Person  Memory: No can dictation of cognitive deficits    Lab Results (last 24 hours)     ** No results found for the last 24 hours. **           Imaging Results (Last 24 Hours)     ** No results found for the last 24 hours. **           ECG/EMG Results (most recent)     Procedure Component Value Units Date/Time    ECG 12 Lead [[id number]] Collected: 02/12/22 1008     Updated: 02/12/22 2235     QT Interval 396 ms      QTC Interval 436 ms     Narrative:      Test Reason : Baseline Cardiac Status  Blood Pressure :   */*   mmHG  Vent. Rate :  73 BPM     Atrial Rate :  73 BPM     P-R Int : 124 ms          QRS Dur :  82 ms      QT Int : 396 ms       P-R-T Axes :  21  79   9 degrees     QTc Int : 436 ms    Normal sinus rhythm  Normal ECG  No previous ECGs available  Confirmed by Didi Castro ([id number]) on 2/12/2022 10:35:03 PM    Referred By: PAVEL           Confirmed By: Didi Castro    ECG 12 Lead [[id number]] Collected: 02/25/22 1729     Updated: 02/26/22 0914     QT Interval 390 ms      QTC Interval 417 ms     Narrative:      Test Reason : monitoring  Blood Pressure :   */*   mmHG  Vent. Rate :  69 BPM     Atrial Rate :  69 BPM     P-R Int : 136 ms          QRS Dur :  84 ms      QT Int : 390 ms       P-R-T Axes :  51  80  31 degrees     QTc Int : 417 ms    Normal sinus rhythm  Normal ECG  When compared with ECG of 12-FEB-2022 10:08,  No significant change was  found  Confirmed by Rene Clark ([id number]) on 2/26/2022 9:13:02 AM    Referred By:            Confirmed By: Rene Clark           ALLERGIES: Patient has no known allergies.    Medications:     FLUoxetine, 20 mg, Oral, Daily  LORazepam, 2 mg, Oral, TID  sodium chloride, 1,000 mL, Intravenous, Once       ASSESSMENT & PLAN      Anorexia nervosa    Severe episode of recurrent major depressive disorder, without psychotic features (HCC)        Major depressive disorder, severe, recurrent, with psychotic features  -Admitted for crisis stabilization  - patient refusing the Abilify  -Continue Prozac 20 mg p.o. daily; intermittently compliant  - lorazepam 1 mg p.o or IM. (patient's choice) every 8 hour , will not be able to administer the lorazepam IM if patient refuses.  Medication volitionally as she has shown improvement following previous doses  -    Anorexia nervosa  -Incorporate behavioral modifications and to treatment plan  -Dietary consult  -Abilify and Prozac as above  -Has required IVF since admission  -Elevated concern for malnutrition      Special precautions: Special Precautions Level 3 (q15 min checks)     Behavioral Health Treatment Plan and Problem List: I have reviewed and approved the Behavioral Health Treatment Plan and Problem list.    I spent a total of 45 minutes in direct patient care including  25 minutes face to face with the patient assessment, coordination of care, and counseling the patient on the current and follow-up treatment plans regarding her status and situation.  Patient had no additional questions.     MIREYA Beebe MD    Clinician:  Russ Beebe MD  03/01/22  11:44 EST    Dictated utilizing Dragon dictation

## 2022-03-02 PROCEDURE — 99233 SBSQ HOSP IP/OBS HIGH 50: CPT | Performed by: PSYCHIATRY & NEUROLOGY

## 2022-03-02 PROCEDURE — 25010000002 LORAZEPAM PER 2 MG: Performed by: PSYCHIATRY & NEUROLOGY

## 2022-03-02 RX ADMIN — LORAZEPAM 1 MG: 1 TABLET ORAL at 10:04

## 2022-03-02 RX ADMIN — LORAZEPAM 2 MG: 2 INJECTION INTRAMUSCULAR at 16:23

## 2022-03-02 RX ADMIN — FLUOXETINE HYDROCHLORIDE 20 MG: 20 CAPSULE ORAL at 10:04

## 2022-03-02 RX ADMIN — LORAZEPAM 1 MG: 1 TABLET ORAL at 20:13

## 2022-03-02 NOTE — PLAN OF CARE
Goal Outcome Evaluation:  Plan of Care Reviewed With: patient  Patient Agreement with Plan of Care: unable to participate     Progress: improving  Outcome Summary: Pt has shown moreno improvement this shift. Pt ate a small portion of her meals. Pt spoke to staff and took her morning meds but would not take her PO meds at 3. Pt was up walking around her room and stated that she wanted her door shut while in her room. Pt had no other issues or concerns.

## 2022-03-02 NOTE — PROGRESS NOTES
"INPATIENT PSYCHIATRIC PROGRESS NOTE    Name:  Kingsley Grimm  :  1995  MRN:  [id number]  Visit Number:  [id number]  Length of stay:  23    Behavioral Health Treatment Plan and Problem List: I have reviewed and approved the Behavioral Health Treatment Plan and Problem list.    SUBJECTIVE    CC/ Focus of exam: Depression/anorexia/psychosis    Patient's subjective status: \"I am sorry\"    INTERVAL HISTORY: The patient remains semicatatonic with the severe latency of response and probably thought blocking, has been making some progress with her diet and is taking the prescribed Prozac and Ativan orally.  Therapist continues to work with the patient encouraging improvement but working against an extreme hesitancy (anxiety/depression) that the patient also struggles with.  Acknowledged patient is making minimal progress on current format we will continue with this effort, in my opinion at this point patient could not safely be managed on an outpatient basis given her tendency to lapse back into catatonia.  Working to explore treatment options with the patient's insurance, residential specialized treatment for anorexia would be the reasonable option to the continuing hospitalization.  When patient asked about death wishes or suicidal thoughts as well as a sense of hopelessness her answers are delayed and qualified with quotes \"it comes and goes\".  Have never experienced patient with this severe level of restricted affect.  Encouraged her today to at least view the ECT video.    Depression rating not able to rate  Anxiety rating not able to rate but obviously severe blocking patient's responses, also to for her depression  Sleep: Adequate         ROS: No cardiovascular, GI, or Neurological complaints.       OBJECTIVE    Vitals:    22 0805   BP: 99/96   Pulse: 79   Resp: 18   Temp: 98.8 °F (37.1 °C)   SpO2: 96%      Temp:  [97.9 °F (36.6 °C)-98.8 °F (37.1 °C)] 98.8 °F (37.1 °C)  Heart Rate:  [56-81] 79  Resp:  " [16-18] 18  BP: ()/(55-96) 99/96    MENTAL STATUS EXAM:         Psychomotor: No psychomotor agitation/retardation, No EPS, No motor tics  Speech-normal rate, amount.  Mood/Affect:  Restricted  Thought Processes:  Thought blocking  Thought Content:  negativistic  Hallucination(s): Yesterday patient qualified that she might not be experiencing auditory hallucinations   Hopelessness: Yes  Optimistic:No  Suicidal Thoughts:   Yes  Suicidal Plan/Intent:  No  Homicidal Thoughts:  absent  Orientation: Place and Person  Memory: recent intact  3  Lab Results (last 24 hours)     ** No results found for the last 24 hours. **           Imaging Results (Last 24 Hours)     ** No results found for the last 24 hours. **           ECG/EMG Results (most recent)     Procedure Component Value Units Date/Time    ECG 12 Lead [[id number]] Collected: 02/12/22 1008     Updated: 02/12/22 2235     QT Interval 396 ms      QTC Interval 436 ms     Narrative:      Test Reason : Baseline Cardiac Status  Blood Pressure :   */*   mmHG  Vent. Rate :  73 BPM     Atrial Rate :  73 BPM     P-R Int : 124 ms          QRS Dur :  82 ms      QT Int : 396 ms       P-R-T Axes :  21  79   9 degrees     QTc Int : 436 ms    Normal sinus rhythm  Normal ECG  No previous ECGs available  Confirmed by Didi Castro ([id number]) on 2/12/2022 10:35:03 PM    Referred By: PAVEL           Confirmed By: Didi Castro    ECG 12 Lead [[id number]] Collected: 02/25/22 1729     Updated: 02/26/22 0914     QT Interval 390 ms      QTC Interval 417 ms     Narrative:      Test Reason : monitoring  Blood Pressure :   */*   mmHG  Vent. Rate :  69 BPM     Atrial Rate :  69 BPM     P-R Int : 136 ms          QRS Dur :  84 ms      QT Int : 390 ms       P-R-T Axes :  51  80  31 degrees     QTc Int : 417 ms    Normal sinus rhythm  Normal ECG  When compared with ECG of 12-FEB-2022 10:08,  No significant change was found  Confirmed by Rene Clark ([id number]) on 2/26/2022 9:13:02  AM    Referred By:            Confirmed By: Rene Subramaniyam           ALLERGIES: Patient has no known allergies.    Medications:     FLUoxetine, 20 mg, Oral, Daily  LORazepam, 1 mg, Oral, TID  sodium chloride, 1,000 mL, Intravenous, Once       ASSESSMENT & PLAN      Anorexia nervosa    Severe episode of recurrent major depressive disorder, without psychotic features (HCC)        Major depressive disorder, severe, recurrent, with psychotic features  -Admitted for crisis stabilization  - patient refusing the Abilify (d/c)  -Continue Prozac 20 mg p.o. daily; intermittently compliant  - lorazepam 1 mg p.o or IM. (patient's choice) every 8 hour , will not be able to administer the lorazepam IM if patient refuses.  Medication volitionally as she has shown improvement following previous doses  Continuing to recommend ECT.   -    Anorexia nervosa  -Incorporate behavioral modifications and to treatment plan  -Dietary consult  -Abilify and Prozac as above  -Has required IVF since admission  -Elevated concern for malnutrition      Special precautions: Special Precautions Level 3 (q15 min checks)     Behavioral Health Treatment Plan and Problem List: I have reviewed and approved the Behavioral Health Treatment Plan and Problem list.    I spent a total of 50 minutes in direct patient care including  30 minutes face to face with the patient assessment, coordination of care, and counseling the patient on the current and follow-up treatment plans regarding her status and situation.  Patient had no additional questions.     MIREYA Beebe MD    Clinician:  Russ Beebe MD  03/02/22  11:51 EST    Dictated utilizing Dragon dictation

## 2022-03-02 NOTE — PLAN OF CARE
Goal Outcome Evaluation:  Plan of Care Reviewed With: patient  Patient Agreement with Plan of Care: agrees        Pt states she has anxiety and depression, but is unable to rate them at this time. Pt would not answer any further questions. She came out into dayroom for a few minutes, but would not eat or drink at snack time. We offered snacks and drinks several times after and she declined. Pt did void.

## 2022-03-02 NOTE — NURSING NOTE
BP 96/55, pt refused po Ativan and IM ativan, pt refused to eat or drink and is laying in bed ready to sleep. Pt has been laying in bed since 1915 and refused to get up for snacks or free time.

## 2022-03-02 NOTE — PLAN OF CARE
Problem: Adult Behavioral Health Plan of Care  Goal: Optimized Coping Skills in Response to Life Stressors  Outcome: Ongoing, Progressing  Intervention: Promote Effective Coping Strategies  Flowsheets (Taken 3/2/2022 1417)  Supportive Measures:   active listening utilized   counseling provided   decision-making supported   goal setting facilitated   positive reinforcement provided   self-care encouraged   verbalization of feelings encouraged   self-responsibility promoted   self-reflection promoted   problem-solving facilitated   relaxation techniques promoted  Goal: Develops/Participates in Therapeutic [place] to Support Successful Transition  Outcome: Ongoing, Progressing  Intervention: Foster Therapeutic [place]  Flowsheets (Taken 3/2/2022 1417)  Trust Relationship/Rapport:   care explained   choices provided   emotional support provided   empathic listening provided   thoughts/feelings acknowledged   reassurance provided   questions encouraged   questions answered  Intervention: Mutually Develop Transition Plan  Flowsheets  Taken 3/2/2022 1417 by Tiara Nance  Offered/Gave Vendor List: no  Taken 2/23/2022 1117 by Tiara Nance  Patient's Choice of Community Agency(s): [facility]  Taken 2/16/2022 1128 by Tiara Nance  Transition Support:   community resources reviewed   crisis management plan promoted   crisis management plan verbalized   follow-up care discussed   follow-up care coordinated  Taken 2/8/2022 1133 by Tiara Nance  Outpatient/Agency/Support Group Needs:   outpatient counseling   outpatient medication management   outpatient psychiatric care (specify)  Discharge Coordination/Progress: Patient has insurance and will not need transportation. Patient unable to participate in discussion about outpatient care due to mental status.  Anticipated Discharge Disposition: home with family  Transportation Concerns: car, none  Current Discharge Risk: psychiatric illness  Concerns to be  Addressed:   mental health   suicidal   adjustment to diagnosis/illness   coping/stress  Readmission Within the Last 30 Days: no previous admission in last 30 days  Taken 2/7/2022 1511 by Elen Barajas RN  Transportation Anticipated: family or friend will provide  Patient/Family Anticipated Services at Transition:   outpatient care   mental health services  Patient/Family Anticipates Transition to: home with family   Goal Outcome Evaluation:   Consent Given to Review Plan with: Therapist spoke with patient's motherZahra  Progress: improving  Outcome Summary: Therapist met with patient to review plan of care; patient unable to participate.       DATA:       Therapist discussed case with Dr. Beebe and met with patient today to review coping skills, review plan of care, and discuss discharge.     Clinical Maneuvering/Intervention:     Therapist assisted patient in processing above session content; acknowledged and normalized patient’s thoughts, feelings, and concerns.  Discussed the therapist/patient relationship and explain the parameters and limitations of relative confidentiality.  Also discussed the importance of active participation, and honesty to the treatment process.  Encouraged the patient to discuss/vent their feelings, frustrations, and fears concerning their ongoing medical issues and validated their feelings.     Allowed patient to freely discuss issues without interruption or judgment. Provided safe, confidential environment to facilitate the development of positive therapeutic relationship and encourage open, honest communication.      Therapist addressed discharge safety planning this date. Assisted patient in identifying risk factors which would indicate the need for higher level of care after discharge;  including thoughts to harm self or others and/or self-harming behavior. Encouraged patient to call 911, or present to the nearest emergency room should any of these events occur. Discussed  crisis intervention services and means to access.  Encouraged securing any objects of harm.    Therapist completed integrated summary, treatment plan, and initiated social history this date.  Therapist is strongly encouraging family involvement in treatment.       Encouraged mask wearing, social distancing, and regular hand washing due to COVID19 risk.      ASSESSMENT:      Therapist met 1:1 with patient today. Patient declining throughout the day. Patient was able to speak with significant delays this morning. She reported increased dizziness and nausea related to not eating. Patient reports intermittent SI, does not identify a plan. Patient with restricted affect and somber affect intermittently. Patient with significant thought blocking. Requesting to change clothes and shower, but has been unable to complete these actions so far.      PLAN:       Patient to remain hospitalized this date.      Treatment team will focus efforts on stabilizing patient's acute symptoms while providing education on healthy coping and crisis management to reduce hospitalizations.   Patient requires daily psychiatrist evaluation and 24/7 nursing supervision to promote patient  safety.     Therapist will offer 1-4 individual sessions, 1 therapy group daily, family education, and appropriate referral.

## 2022-03-02 NOTE — NURSING NOTE
Refused po ativan. Explained benefits. x2 staff members encouraged to take med rn and this nurse.encouraged po fluids and food per several staff members. Refused .

## 2022-03-03 PROCEDURE — 99232 SBSQ HOSP IP/OBS MODERATE 35: CPT | Performed by: PSYCHIATRY & NEUROLOGY

## 2022-03-03 RX ADMIN — FLUOXETINE HYDROCHLORIDE 20 MG: 20 CAPSULE ORAL at 10:58

## 2022-03-03 RX ADMIN — LORAZEPAM 1 MG: 1 TABLET ORAL at 20:09

## 2022-03-03 RX ADMIN — LORAZEPAM 1 MG: 1 TABLET ORAL at 10:59

## 2022-03-03 NOTE — PLAN OF CARE
"Goal Outcome Evaluation:  Plan of Care Reviewed With: patient  Patient Agreement with Plan of Care: refuses to participate     Progress: no change  Outcome Summary: Patient would not participate with shift assessment; patient stated, \"I didn't sleep any last night. Please let me sleep.\" Patient did take scheduled morning PO medications, however, she refused the evening PO meds. Patient was able to go outside this shift with staff, per MD order. Patient reported that she \"really enjoyed\" going outside. Patient did eat lunch, however, she refuses to drink liquids. Staff offered patient a chocolate milkshake, patient refused, stating, \"I would love to drink that but I am already consuming too much. I am going to get fat.\" Patient was encouraged to come out of room and interact with others.  "

## 2022-03-03 NOTE — PLAN OF CARE
Goal Outcome Evaluation:  Plan of Care Reviewed With: patient  Patient Agreement with Plan of Care: unable to participate     Progress: no change  Outcome Summary: Patient would not participate in shift assessment, would not answer questions.  Patient did take scheduled PO medications.  Slept through the night.

## 2022-03-03 NOTE — PLAN OF CARE
Problem: Adult Behavioral Health Plan of Care  Goal: Optimized Coping Skills in Response to Life Stressors  Outcome: Ongoing, Progressing  Intervention: Promote Effective Coping Strategies  Flowsheets (Taken 3/3/2022 0901)  Supportive Measures:  • active listening utilized  • counseling provided  • positive reinforcement provided  • decision-making supported  • goal setting facilitated  • self-care encouraged  • verbalization of feelings encouraged  • self-responsibility promoted  • relaxation techniques promoted  • problem-solving facilitated  • self-reflection promoted  Goal: Develops/Participates in Therapeutic [place] to Support Successful Transition  Outcome: Ongoing, Progressing  Intervention: Foster Therapeutic [place]  Flowsheets (Taken 3/3/2022 0901)  Trust Relationship/Rapport:  • care explained  • choices provided  • emotional support provided  • empathic listening provided  • thoughts/feelings acknowledged  • reassurance provided  • questions encouraged  • questions answered  Intervention: Mutually Develop Transition Plan  Flowsheets  Taken 3/3/2022 0901 by Tiara Nance  Offered/Gave Vendor List: no  Taken 2/23/2022 1117 by Tiara Nance  Patient's Choice of Community Agency(s): [facility]  Taken 2/16/2022 1128 by Tiara Nance  Transition Support:  • community resources reviewed  • crisis management plan promoted  • crisis management plan verbalized  • follow-up care discussed  • follow-up care coordinated  Taken 2/8/2022 1133 by Tiara Nance  Outpatient/Agency/Support Group Needs:  • outpatient counseling  • outpatient medication management  • outpatient psychiatric care (specify)  Discharge Coordination/Progress: Patient has insurance and will not need transportation. Patient unable to participate in discussion about outpatient care due to mental status.  Anticipated Discharge Disposition: home with family  Transportation Concerns: car, none  Current Discharge Risk: psychiatric  illness  Concerns to be Addressed:  • mental health  • suicidal  • adjustment to diagnosis/illness  • coping/stress  Readmission Within the Last 30 Days: no previous admission in last 30 days  Taken 2/7/2022 1511 by Elen Barajas, RN  Transportation Anticipated: family or friend will provide  Patient/Family Anticipated Services at Transition:  • outpatient care  • mental health services  Patient/Family Anticipates Transition to: home with family   Goal Outcome Evaluation:   Consent Given to Review Plan with: Therapist spoke with patient's mother, Zahra  Progress: improving  Outcome Summary: Therapist met with patient to review plan of care; patient unable to participate.       DATA:       Therapist discussed case with Dr. Beebe and met with patient today to review coping skills, review plan of care, and discuss discharge.    0830: Therapist left voicemail for patient's assigned care advocate with [facility], Irene [phone] Ext [phone], Irene will be available to assist with discharge planning or with any gaps in care and requesting information that may be helpful to her working with [facility] once she is discharged. Therapist will continue trying to reach Irene.     1205: Therapist called Irene; she did not answer.        Clinical Maneuvering/Intervention:     Therapist assisted patient in processing above session content; acknowledged and normalized patient’s thoughts, feelings, and concerns.  Discussed the therapist/patient relationship and explain the parameters and limitations of relative confidentiality.  Also discussed the importance of active participation, and honesty to the treatment process.  Encouraged the patient to discuss/vent their feelings, frustrations, and fears concerning their ongoing medical issues and validated their feelings.     Allowed patient to freely discuss issues without interruption or judgment. Provided safe, confidential environment to facilitate the development of  positive therapeutic relationship and encourage open, honest communication.      Therapist addressed discharge safety planning this date. Assisted patient in identifying risk factors which would indicate the need for higher level of care after discharge;  including thoughts to harm self or others and/or self-harming behavior. Encouraged patient to call 911, or present to the nearest emergency room should any of these events occur. Discussed crisis intervention services and means to access.  Encouraged securing any objects of harm.    Therapist completed integrated summary, treatment plan, and initiated social history this date.  Therapist is strongly encouraging family involvement in treatment.       Encouraged mask wearing, social distancing, and regular hand washing due to COVID19 risk.      ASSESSMENT:      Therapist met 1:1 with patient today. Patient with some improvement this morning compared to yesterday. She was agreeable to take her medications and eat after meeting with this therapist and Dr. Beebe. Patient observed sitting in the day room, interacting a little with another patient. Patient reports she is willing to try to participate in treatment plan. Patient continues to present with response lag and slow movements.      PLAN:       Patient to remain hospitalized this date.      Treatment team will focus efforts on stabilizing patient's acute symptoms while providing education on healthy coping and crisis management to reduce hospitalizations.   Patient requires daily psychiatrist evaluation and 24/7 nursing supervision to promote patient  safety.     Therapist will offer 1-4 individual sessions, 1 therapy group daily, family education, and appropriate referral.

## 2022-03-03 NOTE — NURSING NOTE
Pt. In room standing she was offered ativan injection explained she could get since she didn't take the ativan by mouth but if she wanted the medication we could still  give the po med or could get im discussed if she wanted it im can give in the gluteal muscle but she would need to lay in bed  on her side she then got on bed lays on rt. side pt. Cooperative  see in epic ativan given im per LPN .

## 2022-03-03 NOTE — PROGRESS NOTES
"INPATIENT PSYCHIATRIC PROGRESS NOTE    Name:  Kingsley Grimm  :  1995  MRN:  [id number]  Visit Number:  [id number]  Length of stay:  24    Behavioral Health Treatment Plan and Problem List: I have reviewed and approved the Behavioral Health Treatment Plan and Problem list.    SUBJECTIVE    CC/ Focus of exam: Depression/anorexia/psychosis    Patient's subjective status: \" I am willing to try\"    INTERVAL HISTORY: Gently presented the dilemma regarding her refusal to take medications and the ongoing efforts at treatment.  It is clear that if the patient refuses medications and/or refused the eat that we cannot extend the hospitalization here but need to pursue commitment and court ordered treatment.  Patient subsequently took her medications at least today, makes for  10 days of consecutive the Prozac 20 mg.  Patient also offered outside activity.    Depression rating no comment   anxiety rating no comment  Sleep: Adequate         ROS: No cardiovascular, GI, or Neurological complaints.       OBJECTIVE    Vitals:    22 0925   BP: 99/62   Pulse: 90   Resp:    Temp:    SpO2:       Temp:  [98.3 °F (36.8 °C)-98.6 °F (37 °C)] 98.6 °F (37 °C)  Heart Rate:  [60-91] 90  Resp:  [18] 18  BP: (89-99)/(60-69) 99/62    MENTAL STATUS EXAM:         Psychomotor: No psychomotor agitation/retardation, No EPS, No motor tics  Speech-normal rate, amount.  Mood/Affect:  Restricted  Thought Processes:  Lengthy latency of response and probable thought blocking  Thought Content:  negativistic and mood congruent  Hallucination(s): None demonstrated  Hopelessness: No comment  Optimistic:No comment  Suicidal Thoughts:   No comment  Suicidal Plan/Intent: No comment  Homicidal Thoughts:  absent  Orientation: Place and Person  Memory: recent intact    Lab Results (last 24 hours)     ** No results found for the last 24 hours. **           Imaging Results (Last 24 Hours)     ** No results found for the last 24 hours. **           ECG/EMG " Results (most recent)     Procedure Component Value Units Date/Time    ECG 12 Lead [[id number]] Collected: 02/12/22 1008     Updated: 02/12/22 2235     QT Interval 396 ms      QTC Interval 436 ms     Narrative:      Test Reason : Baseline Cardiac Status  Blood Pressure :   */*   mmHG  Vent. Rate :  73 BPM     Atrial Rate :  73 BPM     P-R Int : 124 ms          QRS Dur :  82 ms      QT Int : 396 ms       P-R-T Axes :  21  79   9 degrees     QTc Int : 436 ms    Normal sinus rhythm  Normal ECG  No previous ECGs available  Confirmed by Didi Castro ([id number]) on 2/12/2022 10:35:03 PM    Referred By: PAVEL           Confirmed By: Didi Castro    ECG 12 Lead [[id number]] Collected: 02/25/22 1729     Updated: 02/26/22 0914     QT Interval 390 ms      QTC Interval 417 ms     Narrative:      Test Reason : monitoring  Blood Pressure :   */*   mmHG  Vent. Rate :  69 BPM     Atrial Rate :  69 BPM     P-R Int : 136 ms          QRS Dur :  84 ms      QT Int : 390 ms       P-R-T Axes :  51  80  31 degrees     QTc Int : 417 ms    Normal sinus rhythm  Normal ECG  When compared with ECG of 12-FEB-2022 10:08,  No significant change was found  Confirmed by Rene Clark ([id number]) on 2/26/2022 9:13:02 AM    Referred By:            Confirmed By: Rene Clark           ALLERGIES: Patient has no known allergies.    Medications:     FLUoxetine, 20 mg, Oral, Daily  LORazepam, 1 mg, Oral, TID  sodium chloride, 1,000 mL, Intravenous, Once       ASSESSMENT & PLAN      Anorexia nervosa    Severe episode of recurrent major depressive disorder, without psychotic features (HCC)        Major depressive disorder, severe, recurrent, with psychotic features  -Admitted for crisis stabilization  - patient refusing the Abilify (d/c)  -Continue Prozac 20 mg p.o. daily; intermittently compliant  - lorazepam 1 mg p.o or IM. (patient's choice) every 8 hour , will not be able to administer the lorazepam IM if patient refuses.  Medication  volitionally as she has shown improvement following previous doses  Continuing to recommend ECT.   -    Anorexia nervosa  -Incorporate behavioral modifications and to treatment plan  -Dietary consult  -Abilify and Prozac as above  -Has required IVF since admission  -Elevated concern for malnutrition      Special precautions: Special Precautions Level 3 (q15 min checks)     Behavioral Health Treatment Plan and Problem List: I have reviewed and approved the Behavioral Health Treatment Plan and Problem list.    I spent a total of 26 minutes in direct patient care including  17 minutes face to face with the patient assessment, coordination of care, and counseling the patient on the current and follow-up treatment plans regarding her status and situation.  Patient had no additional questions.     MIREYA Beebe MD    Clinician:  Russ Beebe MD  03/03/22  11:03 EST    Dictated utilizing Dragon dictation

## 2022-03-03 NOTE — NURSING NOTE
1700 pt. Mother came on unit to visit pt. Was in room & mother  did visit with pt.in room  but then came out to dayroom for pt. To eat her dinner some interaction noted pt, did eat & drink while mother was sitting with her to visit .

## 2022-03-03 NOTE — NURSING NOTE
PT REFUSED ATIVAN 1 MG. PT STATED IT MAKES HER SLEEPY. BP 98/64 PULSE 82. PT ENCOURAGED TO TAKE MEDICINE, BUT STILL DECLINED. RN AWARE

## 2022-03-03 NOTE — NURSING NOTE
Patient returned to the unit with security and MHT. Patient reported that she enjoyed going outside and thanked the staff.

## 2022-03-04 PROCEDURE — 99232 SBSQ HOSP IP/OBS MODERATE 35: CPT | Performed by: PSYCHIATRY & NEUROLOGY

## 2022-03-04 RX ADMIN — LORAZEPAM 1 MG: 1 TABLET ORAL at 21:04

## 2022-03-04 NOTE — PLAN OF CARE
Goal Outcome Evaluation:  Plan of Care Reviewed With: patient  Patient Agreement with Plan of Care: agrees     Progress: declining  Outcome Summary: Patient much worse today. Patient refused meals, drinks, and PO medications all shift. Staff has encouraged patient to eat and drink. Patient continues to have response lag. Patient blankly stares at staff and smirks and grins while being talked to. Staff explained the importance of good nutrition for patient's health and this importance of hydration. Patient showed no evidence of understanding. Patient did tell staff at lunchtime that she thought that she was consuming too much and would get fat. Staff will continue to encourage patient to interact with others, eat and drink.

## 2022-03-04 NOTE — PLAN OF CARE
Goal Outcome Evaluation:  Plan of Care Reviewed With: patient  Patient Agreement with Plan of Care: agrees     Progress: no change  Outcome Summary: Patient with response lag and difficulty answering assessment questions. When asked about anxiety she stated she was anxious but unable to rate. Unable to rate depression as well. Complained of being tired. Told me she wasn't confused but wouldn't answer any orientation questions. Up to bathroom x1 this shift. Drank fluids with meds only. Declined snack.

## 2022-03-04 NOTE — PLAN OF CARE
Problem: Adult Behavioral Health Plan of Care  Goal: Optimized Coping Skills in Response to Life Stressors  Outcome: Ongoing, Progressing  Intervention: Promote Effective Coping Strategies  Flowsheets (Taken 3/4/2022 1415)  Supportive Measures:   active listening utilized   counseling provided   decision-making supported   goal setting facilitated   positive reinforcement provided   problem-solving facilitated   self-care encouraged   relaxation techniques promoted   verbalization of feelings encouraged   self-responsibility promoted   self-reflection promoted  Goal: Develops/Participates in Therapeutic [place] to Support Successful Transition  Outcome: Ongoing, Progressing  Intervention: Foster Therapeutic [place]  Flowsheets (Taken 3/4/2022 1415)  Trust Relationship/Rapport:   care explained   choices provided   emotional support provided   empathic listening provided   thoughts/feelings acknowledged   reassurance provided   questions encouraged   questions answered  Intervention: Mutually Develop Transition Plan  Flowsheets  Taken 3/4/2022 1415 by Tiara Nance  Offered/Gave Vendor List: no  Taken 2/23/2022 1117 by Tiara Nance  Patient's Choice of Community Agency(s): [facility]  Taken 2/16/2022 1128 by Tiara Nance  Transition Support:   community resources reviewed   crisis management plan promoted   crisis management plan verbalized   follow-up care discussed   follow-up care coordinated  Taken 2/8/2022 1133 by Tiara Nance  Outpatient/Agency/Support Group Needs:   outpatient counseling   outpatient medication management   outpatient psychiatric care (specify)  Discharge Coordination/Progress: Patient has insurance and will not need transportation. Patient unable to participate in discussion about outpatient care due to mental status.  Anticipated Discharge Disposition: home with family  Transportation Concerns: car, none  Current Discharge Risk: psychiatric illness  Concerns to be  Addressed:   mental health   suicidal   adjustment to diagnosis/illness   coping/stress  Readmission Within the Last 30 Days: no previous admission in last 30 days  Taken 2/7/2022 1511 by Elen Barajas RN  Transportation Anticipated: family or friend will provide  Patient/Family Anticipated Services at Transition:   outpatient care   mental health services  Patient/Family Anticipates Transition to: home with family   Goal Outcome Evaluation:   Consent Given to Review Plan with: Therapist spoke with patient's motherZahra  Progress: improving  Outcome Summary: Therapist met with patient to review plan of care; patient unable to participate.       DATA:      Therapist discussed case with Dr. Beebe and met with patient today to review coping skills, review plan of care, and discuss discharge.     Clinical Maneuvering/Intervention:     Therapist assisted patient in processing above session content; acknowledged and normalized patient’s thoughts, feelings, and concerns.  Discussed the therapist/patient relationship and explain the parameters and limitations of relative confidentiality.  Also discussed the importance of active participation, and honesty to the treatment process.  Encouraged the patient to discuss/vent their feelings, frustrations, and fears concerning their ongoing medical issues and validated their feelings.     Allowed patient to freely discuss issues without interruption or judgment. Provided safe, confidential environment to facilitate the development of positive therapeutic relationship and encourage open, honest communication.      Therapist addressed discharge safety planning this date. Assisted patient in identifying risk factors which would indicate the need for higher level of care after discharge;  including thoughts to harm self or others and/or self-harming behavior. Encouraged patient to call 911, or present to the nearest emergency room should any of these events occur. Discussed  crisis intervention services and means to access.  Encouraged securing any objects of harm.    Therapist completed integrated summary, treatment plan, and initiated social history this date.  Therapist is strongly encouraging family involvement in treatment.       Encouraged mask wearing, social distancing, and regular hand washing due to COVID19 risk.      ASSESSMENT:      Therapist met 1:1 with patient today. Patient struggling to eat, stating she is fearful she is consuming too much and will gain weight. Patient attempting to eat lunch, sat at the table for 2.5 hours and per report, ate about 25% of her meal. Patient is putting forth effort. Patient presenting with response lag. Is spending time in the day room watching TV.      PLAN:       Patient to remain hospitalized this date.      Treatment team will focus efforts on stabilizing patient's acute symptoms while providing education on healthy coping and crisis management to reduce hospitalizations.   Patient requires daily psychiatrist evaluation and 24/7 nursing supervision to promote patient  safety.     Therapist will offer 1-4 individual sessions, 1 therapy group daily, family education, and appropriate referral.

## 2022-03-05 PROCEDURE — 99231 SBSQ HOSP IP/OBS SF/LOW 25: CPT | Performed by: PSYCHIATRY & NEUROLOGY

## 2022-03-05 RX ADMIN — FLUOXETINE HYDROCHLORIDE 20 MG: 20 CAPSULE ORAL at 08:00

## 2022-03-05 RX ADMIN — LORAZEPAM 1 MG: 1 TABLET ORAL at 16:04

## 2022-03-05 RX ADMIN — LORAZEPAM 1 MG: 1 TABLET ORAL at 08:00

## 2022-03-05 NOTE — PLAN OF CARE
Goal Outcome Evaluation:  Plan of Care Reviewed With: patient  Patient Agreement with Plan of Care: agrees     Progress: declining  Outcome Evaluation: Patient refused to answer assessment questions.  Patient refused snack and drink.  Staff encouraged patient to eat and drink.  Patient took shower with staff assistance.  Patient isolated in room this shift.

## 2022-03-05 NOTE — PROGRESS NOTES
"INPATIENT PSYCHIATRIC PROGRESS NOTE    Name:  Kingsley Grimm  :  1995  MRN:  [id number]  Visit Number:  [id number]  Length of stay:  26    Behavioral Health Treatment Plan and Problem List: I have reviewed and approved the Behavioral Health Treatment Plan and Problem list.    SUBJECTIVE    CC/ Focus of exam: depression/ anxiety/ psychosis.     Patient's subjective status: \"Good\" - after substantial delay     INTERVAL HISTORY: Patient is making some improvements.  As with most exams so far, she has a severely prolonged latency and lack of spontaneity to conversation but has been more interactive and does attempt to respond to my questioning today.  She endorses that she is feeling somewhat better and overnight took a shower, took her medications, and has been eating.       Sleep: Adequate         ROS: No cardiovascular, GI, or Neurological complaints.       OBJECTIVE    Vitals:    22 0730   BP: 103/74   Pulse: 66   Resp: 18   Temp: 98.6 °F (37 °C)   SpO2: 97%      Temp:  [97 °F (36.1 °C)-98.6 °F (37 °C)] 98.6 °F (37 °C)  Heart Rate:  [] 66  Resp:  [18-19] 18  BP: (103-119)/(71-90) 103/74    MENTAL STATUS EXAM:         Psychomotor: + psychomotor retardation, No EPS, No motor tics  Speech-slowed rate, prosody, decreased amount  Mood/Affect:  Blunted and Flat, mildly improved  Thought Processes:  Probable thought blocking  Thought Content:  negativistic and mood congruent  Hallucination(s): none  Hopelessness: Unable to rate  Optimistic:Unable to rate  Suicidal Thoughts:   Unable to rate  Suicidal Plan/Intent:  No  Homicidal Thoughts:  absent  Orientation: Place and Person  Memory: Unable to rate, does not seem to be confused    Lab Results (last 24 hours)     ** No results found for the last 24 hours. **           Imaging Results (Last 24 Hours)     ** No results found for the last 24 hours. **           ECG/EMG Results (most recent)     Procedure Component Value Units Date/Time    ECG 12 Lead " [[id number]] Collected: 02/12/22 1008     Updated: 02/12/22 2235     QT Interval 396 ms      QTC Interval 436 ms     Narrative:      Test Reason : Baseline Cardiac Status  Blood Pressure :   */*   mmHG  Vent. Rate :  73 BPM     Atrial Rate :  73 BPM     P-R Int : 124 ms          QRS Dur :  82 ms      QT Int : 396 ms       P-R-T Axes :  21  79   9 degrees     QTc Int : 436 ms    Normal sinus rhythm  Normal ECG  No previous ECGs available  Confirmed by Didi Castro ([id number]) on 2/12/2022 10:35:03 PM    Referred By: PAVEL           Confirmed By: Didi Castro    ECG 12 Lead [[id number]] Collected: 02/25/22 1729     Updated: 02/26/22 0914     QT Interval 390 ms      QTC Interval 417 ms     Narrative:      Test Reason : monitoring  Blood Pressure :   */*   mmHG  Vent. Rate :  69 BPM     Atrial Rate :  69 BPM     P-R Int : 136 ms          QRS Dur :  84 ms      QT Int : 390 ms       P-R-T Axes :  51  80  31 degrees     QTc Int : 417 ms    Normal sinus rhythm  Normal ECG  When compared with ECG of 12-FEB-2022 10:08,  No significant change was found  Confirmed by Rene Clark ([id number]) on 2/26/2022 9:13:02 AM    Referred By:            Confirmed By: Rene Clark           ALLERGIES: Patient has no known allergies.    Medications:     FLUoxetine, 20 mg, Oral, Daily  LORazepam, 1 mg, Oral, TID         ASSESSMENT & PLAN    Major depressive disorder, severe, recurrent, without discernible psychotic features, with catatonia  -Admitted for crisis stabilization  -Discontinue Abilify, patient refusing  -Continue Prozac 20 mg p.o. daily; intermittently compliant  -Scheduled lorazepam 1 mg p.o or IM. (patient's choice) every 8 hour , will not be able to administer the lorazepam IM if patient refuses.  Medication volitionally as she has shown improvement following previous doses  -Continue recommend ECT for catatonic symptoms  -Patient showing mild improvements on a day-to-day basis      Anorexia nervosa  -Incorporate  behavioral modifications and to treatment plan  -Dietary consult placed  -Prozac as above  -Has required IVF since admission  -Elevated concern for malnutrition      Special precautions: Special Precautions Level 3 (q15 min checks)     Behavioral Health Treatment Plan and Problem List: I have reviewed and approved the Behavioral Health Treatment Plan and Problem list.      Clinician:  Saúl Sharma MD  03/05/22  13:04 EST    Dictated utilizing Dragon dictation

## 2022-03-05 NOTE — PLAN OF CARE
"Goal Outcome Evaluation:  Plan of Care Reviewed With: patient  Patient Agreement with Plan of Care: agrees     Progress: improving  Outcome Evaluation: Patient calm and cooperative this shift. Patient participated with shift assessment today. Patient took shower on nightshift. Patient came out of her room and ate 25% of breakfast and 75% of lunch. Patient has also took prescribed PO medications today. Patient has talked to staff today and has laughed throughout the day. When asked to rate her anxiety, patient stated, \"yes.\" When asked to rate her depression, patient stated, \"a little bit.\" Patient's appetite remains poor. Staff has encouraged patient to eat and drink. Staff has offered patient food and drinks throughout the shift.  "

## 2022-03-06 PROCEDURE — 99231 SBSQ HOSP IP/OBS SF/LOW 25: CPT | Performed by: PSYCHIATRY & NEUROLOGY

## 2022-03-06 RX ADMIN — LORAZEPAM 1 MG: 1 TABLET ORAL at 08:07

## 2022-03-06 RX ADMIN — FLUOXETINE HYDROCHLORIDE 20 MG: 20 CAPSULE ORAL at 08:07

## 2022-03-06 RX ADMIN — LORAZEPAM 1 MG: 1 TABLET ORAL at 16:34

## 2022-03-06 RX ADMIN — LORAZEPAM 1 MG: 1 TABLET ORAL at 21:12

## 2022-03-06 NOTE — PLAN OF CARE
"Goal Outcome Evaluation:  Plan of Care Reviewed With: patient  Patient Agreement with Plan of Care: agrees     Progress: improving  Outcome Evaluation: Patient has shown improvement today. She has took all prescribed PO medications this shift and has talked and laughed throughout the shift. She came out of room for breakfast and lunch, however, she refused to come out of her room at dinner time, stating, \"I'm just too tired. Would you please bring me my boost and I'll drink it in here?\" Patient did drink all of her boost. This RN praised her for doing so well today, patient smiled and gave this RN a high five.  "

## 2022-03-06 NOTE — PLAN OF CARE
"Goal Outcome Evaluation:  Plan of Care Reviewed With: patient  Patient Agreement with Plan of Care: agrees     Progress: no change  Outcome Evaluation: Refused to participate in assessment. Would smile when questions asked but wouldn't answer. Was resistive to taking her h.s. meds. Staff attempted to sit her up in bed, but she stiffened up and kept saying \"leave me alone\". Clamped her mouth together when attempts was made to give her her scheduled Ativan. Has resonse lag when she does actually answer staff. When asked if she wanted her light off, she replied \"yes please.\"  "

## 2022-03-06 NOTE — NURSING NOTE
Patient refused scheduled Lorazepam PO.  Patient encouraged to take medicine by writer and KATHARINA Simmons RN, but still declined.

## 2022-03-06 NOTE — PROGRESS NOTES
INPATIENT PSYCHIATRIC PROGRESS NOTE    Name:  Kingsley Grimm  :  1995  MRN:  [id number]  Visit Number:  [id number]  Length of stay:  27    Behavioral Health Treatment Plan and Problem List: I have reviewed and approved the Behavioral Health Treatment Plan and Problem list.    SUBJECTIVE    CC/ Focus of exam: depression/ anxiety/ psychosis.     Patient's subjective status: Patient with a significant delay today and does not attempt to answer me but smiles without blinking    INTERVAL HISTORY: Patient does seem to be more alert and active today but she continues to have significant psychomotor retardation, delay, thought blocking.  I was unable to get any response out of her today.       Sleep: Adequate         ROS: No cardiovascular, GI, or Neurological complaints.       OBJECTIVE    Vitals:    22 0800   BP: 99/70   Pulse: 86   Resp: 18   Temp: 97.6 °F (36.4 °C)   SpO2:       Temp:  [97.6 °F (36.4 °C)-98.4 °F (36.9 °C)] 97.6 °F (36.4 °C)  Heart Rate:  [68-86] 86  Resp:  [18] 18  BP: ()/(63-70) 99/70    MENTAL STATUS EXAM:     Mental Status exam performed 2022  and patient shows no significant changes from previous exam.     Psychomotor: + psychomotor retardation, No EPS, No motor tics  Speech-slowed rate, prosody, decreased amount  Mood/Affect:  Blunted and Flat, mildly improved  Thought Processes:  Probable thought blocking  Thought Content:  negativistic and mood congruent  Hallucination(s): none  Hopelessness: Unable to rate  Optimistic:Unable to rate  Suicidal Thoughts:   Unable to rate  Suicidal Plan/Intent:  No  Homicidal Thoughts:  absent  Orientation: Place and Person  Memory: Unable to rate, does not seem to be confused    Lab Results (last 24 hours)     ** No results found for the last 24 hours. **           Imaging Results (Last 24 Hours)     ** No results found for the last 24 hours. **           ECG/EMG Results (most recent)     Procedure Component Value Units Date/Time    ECG  12 Lead [[id number]] Collected: 02/12/22 1008     Updated: 02/12/22 2235     QT Interval 396 ms      QTC Interval 436 ms     Narrative:      Test Reason : Baseline Cardiac Status  Blood Pressure :   */*   mmHG  Vent. Rate :  73 BPM     Atrial Rate :  73 BPM     P-R Int : 124 ms          QRS Dur :  82 ms      QT Int : 396 ms       P-R-T Axes :  21  79   9 degrees     QTc Int : 436 ms    Normal sinus rhythm  Normal ECG  No previous ECGs available  Confirmed by Didi Castro ([id number]) on 2/12/2022 10:35:03 PM    Referred By: PAVEL           Confirmed By: Didi Castro    ECG 12 Lead [[id number]] Collected: 02/25/22 1729     Updated: 02/26/22 0914     QT Interval 390 ms      QTC Interval 417 ms     Narrative:      Test Reason : monitoring  Blood Pressure :   */*   mmHG  Vent. Rate :  69 BPM     Atrial Rate :  69 BPM     P-R Int : 136 ms          QRS Dur :  84 ms      QT Int : 390 ms       P-R-T Axes :  51  80  31 degrees     QTc Int : 417 ms    Normal sinus rhythm  Normal ECG  When compared with ECG of 12-FEB-2022 10:08,  No significant change was found  Confirmed by Rene Clark ([id number]) on 2/26/2022 9:13:02 AM    Referred By:            Confirmed By: Rene Clark           ALLERGIES: Patient has no known allergies.    Medications:     FLUoxetine, 20 mg, Oral, Daily  LORazepam, 1 mg, Oral, TID         ASSESSMENT & PLAN    Major depressive disorder, severe, recurrent, without discernible psychotic features, with catatonia  -Admitted for crisis stabilization  -Discontinue Abilify, patient refusing  -Continue Prozac 20 mg p.o. daily; intermittently compliant  -Scheduled lorazepam 1 mg p.o or IM. (patient's choice) every 8 hour , will not be able to administer the lorazepam IM if patient refuses.  Medication volitionally as she has shown improvement following previous doses  -Continue recommend ECT for catatonic symptoms  -Patient showing mild improvements on a day-to-day basis but seems somewhat  regressive of speech today though actual activity and affect has changed       Anorexia nervosa  -Incorporate behavioral modifications and to treatment plan  -Dietary consult placed  -Prozac as above  -Has required IVF since admission  -Elevated concern for malnutrition      Special precautions: Special Precautions Level 3 (q15 min checks)     Behavioral Health Treatment Plan and Problem List: I have reviewed and approved the Behavioral Health Treatment Plan and Problem list.      Clinician:  Saúl Sharma MD  03/06/22  14:45 EST    Dictated utilizing Dragon dictation

## 2022-03-07 PROCEDURE — 99232 SBSQ HOSP IP/OBS MODERATE 35: CPT | Performed by: PSYCHIATRY & NEUROLOGY

## 2022-03-07 RX ORDER — FLUOXETINE HYDROCHLORIDE 20 MG/1
40 CAPSULE ORAL DAILY
Status: DISCONTINUED | OUTPATIENT
Start: 2022-03-08 | End: 2022-03-09 | Stop reason: HOSPADM

## 2022-03-07 NOTE — PROGRESS NOTES
Patient has been rescheduled for the following day:     [facility]  [address] [place], [place] [address] [phone]    March 29 2022 at 3:00pm with Marika

## 2022-03-07 NOTE — PROGRESS NOTES
INPATIENT PSYCHIATRIC PROGRESS NOTE    Name:  Kingsley Grimm  :  1995  MRN:  [id number]  Visit Number:  [id number]  Length of stay:  28    Behavioral Health Treatment Plan and Problem List: I have reviewed and approved the Behavioral Health Treatment Plan and Problem list.    SUBJECTIVE    CC/ Focus of exam: Depression/psychosis/anorexia    Patient's subjective status: Patient mute    INTERVAL HISTORY: Patient's status varies considerably day-to-day.  Last Friday she was noted to be experiencing auditory hallucinations along with bizarre behavior, Saturday and  she was taking her medication and interacting somewhat more appropriately with staff and peers.  However at best she eats perhaps 30% of food offered.  This morning patient became agitated and by noon had not taken her medication nor has she eaten anything.   The plan is to invite her mother to participate but if the patient status does not improve will left only with the option of commitment to achieve court ordered treatment.     Depression rating not rated  Anxiety rating not rated sleep:  Attempts at interview this morning met with the patient's nonresponse and inappropriate smile.     ROS: No cardiovascular, GI, or Neurological complaints.       OBJECTIVE    Vitals:    22 0730   BP: 94/70   Pulse: 78   Resp: 16   Temp: 97.3 °F (36.3 °C)   SpO2: 99%      Temp:  [97.3 °F (36.3 °C)-97.5 °F (36.4 °C)] 97.3 °F (36.3 °C)  Heart Rate:  [63-78] 78  Resp:  [16-18] 16  BP: ()/(66-70) 94/70    MENTAL STATUS EXAM:         Psychomotor: No psychomotor agitation/retardation, No EPS, No motor tics  Speech-normal rate, amount.  Mood/Affect:  Inappropriate  Thought Processes:  Patient continues to demonstrate latency of response when she will respond at all  Thought Content:  Continues to struggle with meals as described in notes for fear gaining weight  Hallucination(s): Patient was described as experiencing auditory hallucinations on March  "fourth  Hopelessness: Patient has expressed \"I'm done\"  Optimistic:No  Suicidal Thoughts:   No specific suicidal thoughts expressed  Suicidal Plan/Intent:  No  Homicidal Thoughts:  absent  Orientation: Place and Person  Memory: Memory seems to be intact judging from interactions with staff    Lab Results (last 24 hours)     ** No results found for the last 24 hours. **           Imaging Results (Last 24 Hours)     ** No results found for the last 24 hours. **           ECG/EMG Results (most recent)     Procedure Component Value Units Date/Time    ECG 12 Lead [[id number]] Collected: 02/12/22 1008     Updated: 02/12/22 2235     QT Interval 396 ms      QTC Interval 436 ms     Narrative:      Test Reason : Baseline Cardiac Status  Blood Pressure :   */*   mmHG  Vent. Rate :  73 BPM     Atrial Rate :  73 BPM     P-R Int : 124 ms          QRS Dur :  82 ms      QT Int : 396 ms       P-R-T Axes :  21  79   9 degrees     QTc Int : 436 ms    Normal sinus rhythm  Normal ECG  No previous ECGs available  Confirmed by Didi Castro ([id number]) on 2/12/2022 10:35:03 PM    Referred By: PAVEL           Confirmed By: Didi Castro    ECG 12 Lead [[id number]] Collected: 02/25/22 1729     Updated: 02/26/22 0914     QT Interval 390 ms      QTC Interval 417 ms     Narrative:      Test Reason : monitoring  Blood Pressure :   */*   mmHG  Vent. Rate :  69 BPM     Atrial Rate :  69 BPM     P-R Int : 136 ms          QRS Dur :  84 ms      QT Int : 390 ms       P-R-T Axes :  51  80  31 degrees     QTc Int : 417 ms    Normal sinus rhythm  Normal ECG  When compared with ECG of 12-FEB-2022 10:08,  No significant change was found  Confirmed by Rene Clark ([id number]) on 2/26/2022 9:13:02 AM    Referred By:            Confirmed By: Rene Clark           ALLERGIES: Patient has no known allergies.    Medications:     FLUoxetine, 20 mg, Oral, Daily  LORazepam, 1 mg, Oral, TID  sodium chloride, 1,000 mL, Intravenous, Once       ASSESSMENT " & PLAN    Major depressive disorder, severe, recurrent, with psychotic features  -Admitted for crisis stabilization  - patient refusing the Abilify (d/c)  -We will try increasing the Prozac to 40 mg daily p.o. daily; intermittently compliant  - lorazepam 1 mg p.o or IM. (patient's choice) every 8 hour , will not be able to administer the lorazepam IM if patient refuses.  Medication volitionally as she has shown improvement following previous doses  Continuing to recommend ECT.   -    Anorexia nervosa  -Incorporate behavioral modifications and to treatment plan  -Dietary consult  -Abilify and Prozac as above  -Has required IVF since admission  -Elevated concern for malnutrition      Special precautions: Special Precautions Level 3 (q15 min checks)     Behavioral Health Treatment Plan and Problem List: I have reviewed and approved the Behavioral Health Treatment Plan and Problem list.    I spent a total of 26 minutes in direct patient care including  17 minutes face to face with the patient assessment, coordination of care, and counseling the patient on the current and follow-up treatment plans regarding her status and situation.  Patient had no additional questions.     MIREYA Beebe MD    Clinician:  Russ Beebe MD  03/07/22  11:17 EST    Dictated utilizing Dragon dictation

## 2022-03-07 NOTE — PLAN OF CARE
Problem: Adult Behavioral Health Plan of Care  Goal: Optimized Coping Skills in Response to Life Stressors  3/7/2022 1634 by Tiara Nance  Outcome: Ongoing, Progressing  3/7/2022 1025 by Tiara Nance  Outcome: Ongoing, Progressing  Intervention: Promote Effective Coping Strategies  Flowsheets (Taken 3/7/2022 1634)  Supportive Measures:   active listening utilized   decision-making supported   positive reinforcement provided   problem-solving facilitated   self-care encouraged   verbalization of feelings encouraged  Goal: Develops/Participates in Therapeutic [place] to Support Successful Transition  Outcome: Ongoing, Progressing  Intervention: Foster Therapeutic [place]  Flowsheets (Taken 3/7/2022 1634)  Trust Relationship/Rapport:   care explained   choices provided   emotional support provided   empathic listening provided   thoughts/feelings acknowledged   reassurance provided   questions encouraged   questions answered  Intervention: Mutually Develop Transition Plan  Flowsheets  Taken 2/23/2022 1117 by Tiara Nance  Patient's Choice of Community Agency(s): [facility]  Taken 2/16/2022 1128 by Tiara Nance  Transition Support:   community resources reviewed   crisis management plan promoted   crisis management plan verbalized   follow-up care discussed   follow-up care coordinated  Taken 2/8/2022 1133 by Tiara Nance  Outpatient/Agency/Support Group Needs:   outpatient counseling   outpatient medication management   outpatient psychiatric care (specify)  Discharge Coordination/Progress: Patient has insurance and will not need transportation. Patient unable to participate in discussion about outpatient care due to mental status.  Anticipated Discharge Disposition: home with family  Transportation Concerns: car, none  Current Discharge Risk: psychiatric illness  Concerns to be Addressed:   mental health   suicidal   adjustment to diagnosis/illness   coping/stress  Readmission Within the Last  "30 Days: no previous admission in last 30 days  Taken 2/7/2022 1511 by Elen Barajas, RN  Transportation Anticipated: family or friend will provide  Patient/Family Anticipated Services at Transition:   outpatient care   mental health services  Patient/Family Anticipates Transition to: home with family   Goal Outcome Evaluation:   Consent Given to Review Plan with: Therapist spoke with patient's motherZahra  Progress: improving  Outcome Evaluation: Therapist met with patient to review plan of care; patient unable to participate.       DATA:      Therapist discussed case with Dr. Beebe and met with patient today to review coping skills, review plan of care, and discuss discharge.    Therapist completed petition paperwork and faxed [facility] the [facility].     Therapist notified patient's mother/guardian of the developments. Zahra was very emotional, stating she is worried that the patient will \"get lost in the system\" in a state facility. She reports she does not understand the point in a transfer because she feels they will not do anything differently than we do here. Zahra verbalized that she will look into [facility] and [facility] so she knows what to expect. Zahra reports feeling like a failure as a mother, becoming nearly hysterical at times.     Therapist notified Greg of patient's status change and the pending transfer. Greg was disappointed, but verbalized understanding and support of doing whatever is necessary for patient's health and safety.         Clinical Maneuvering/Intervention:     Therapist assisted patient in processing above session content; acknowledged and normalized patient’s thoughts, feelings, and concerns.  Discussed the therapist/patient relationship and explain the parameters and limitations of relative confidentiality.  Also discussed the importance of active participation, and honesty to the treatment process.  Encouraged the patient to discuss/vent their feelings, " frustrations, and fears concerning their ongoing medical issues and validated their feelings.     Allowed patient to freely discuss issues without interruption or judgment. Provided safe, confidential environment to facilitate the development of positive therapeutic relationship and encourage open, honest communication.      Therapist addressed discharge safety planning this date. Assisted patient in identifying risk factors which would indicate the need for higher level of care after discharge;  including thoughts to harm self or others and/or self-harming behavior. Encouraged patient to call 911, or present to the nearest emergency room should any of these events occur. Discussed crisis intervention services and means to access.  Encouraged securing any objects of harm.    Therapist completed integrated summary, treatment plan, and initiated social history this date.  Therapist is strongly encouraging family involvement in treatment.       Encouraged mask wearing, social distancing, and regular hand washing due to COVID19 risk.      ASSESSMENT:      Therapist met 1:1 with patient today. Patient is declining. Patient presenting with increased agitation, mood liability, inappropriate laughter, and impulsivity. Patient's response lag is absent. Patient yelling at staff and refusing to comply with treatment. Patient responding to internal stimuli, unable to maintain a conversation. Present symptoms are very polar to previous symptoms.      PLAN:       Patient to remain hospitalized this date.      Treatment team will focus efforts on stabilizing patient's acute symptoms while providing education on healthy coping and crisis management to reduce hospitalizations.   Patient requires daily psychiatrist evaluation and 24/7 nursing supervision to promote patient  safety.     Therapist will offer 1-4 individual sessions, 1 therapy group daily, family education, and appropriate referral.

## 2022-03-07 NOTE — PLAN OF CARE
Problem: Adult Behavioral Health Plan of Care  Goal: Plan of Care Review  Recent Flowsheet Documentation  Taken 3/7/2022 1434 by Sam Ricks, RN  Patient Agreement with Plan of Care: refuses to participate  Outcome Evaluation: PATIENT REFUSES TO COOPERATE WITH STAFF. PATIENT WAS MADE SP1, BRIEFLY, THIS SHIFT AFTER  SHE BECAME AGITATED. PATIENT IS CALM AT THIS TIME. NO ACUTE S/S OF DISTRESS NOTED.   Goal Outcome Evaluation:  Plan of Care Reviewed With: patient  Patient Agreement with Plan of Care: refuses to participate     Progress: improving  Outcome Evaluation: PATIENT REFUSES TO COOPERATE WITH STAFF. PATIENT WAS MADE SP1, BRIEFLY, THIS SHIFT AFTER  SHE BECAME AGITATED. PATIENT IS CALM AT THIS TIME. NO ACUTE S/S OF DISTRESS NOTED.

## 2022-03-07 NOTE — PLAN OF CARE
Goal Outcome Evaluation:  Plan of Care Reviewed With: patient  Patient Agreement with Plan of Care: agrees     Progress: no change  Outcome Evaluation: Refused to participate in assessment this shift. Would just stare, move her lips or just smile. Did take h.s. with sips of liquid but refused any further liquids.

## 2022-03-07 NOTE — NURSING NOTE
PATIENT BECAME ANGRY AND AGITATED AT STAFF. PATIENT BEHAVING IN A BIZARRE MANNER, ATTEMPTING TO SECLUDE SELF IN THE BATHROOM AND BEHIND SHOWER CURTAIN. PATIENT COULD NOT BE REDIRECTED. PATIENT WAS MADE SP1.

## 2022-03-07 NOTE — NURSING NOTE
ATTEMPTED TO GET PATIENT UP FOR BREAKFAST. PATIENT IS NOT COOPERATIVE AT THIS TIME. PATIENT REFUSED DAILY ASSESSMENT.

## 2022-03-07 NOTE — NURSING NOTE
ENCOURAGED PATIENT TO LAY IN HER BED INSTEAD OF THE FLOOR. ENCOURAGED PATIENT TO DRINK. PATIENT REFUSED.

## 2022-03-08 ENCOUNTER — APPOINTMENT (OUTPATIENT)
Dept: CT IMAGING | Facility: HOSPITAL | Age: 27
End: 2022-03-08

## 2022-03-08 PROBLEM — F33.2 SEVERE EPISODE OF RECURRENT MAJOR DEPRESSIVE DISORDER, WITHOUT PSYCHOTIC FEATURES (HCC): Status: RESOLVED | Noted: 2022-02-16 | Resolved: 2022-03-08

## 2022-03-08 LAB
ALBUMIN SERPL-MCNC: 4.74 G/DL (ref 3.5–5.2)
ALBUMIN/GLOB SERPL: 1.5 G/DL
ALP SERPL-CCNC: 76 U/L (ref 39–117)
ALT SERPL W P-5'-P-CCNC: 7 U/L (ref 1–33)
ANION GAP SERPL CALCULATED.3IONS-SCNC: 19.5 MMOL/L (ref 5–15)
AST SERPL-CCNC: 20 U/L (ref 1–32)
BASOPHILS # BLD AUTO: 0.07 10*3/MM3 (ref 0–0.2)
BASOPHILS NFR BLD AUTO: 1.1 % (ref 0–1.5)
BILIRUB SERPL-MCNC: 1.2 MG/DL (ref 0–1.2)
BUN SERPL-MCNC: 13 MG/DL (ref 6–20)
BUN/CREAT SERPL: 15.9 (ref 7–25)
CALCIUM SPEC-SCNC: 9.7 MG/DL (ref 8.6–10.5)
CHLORIDE SERPL-SCNC: 101 MMOL/L (ref 98–107)
CO2 SERPL-SCNC: 16.5 MMOL/L (ref 22–29)
CREAT SERPL-MCNC: 0.82 MG/DL (ref 0.57–1)
DEPRECATED RDW RBC AUTO: 41.6 FL (ref 37–54)
EGFRCR SERPLBLD CKD-EPI 2021: 102.6 ML/MIN/1.73
EOSINOPHIL # BLD AUTO: 0.06 10*3/MM3 (ref 0–0.4)
EOSINOPHIL NFR BLD AUTO: 0.9 % (ref 0.3–6.2)
ERYTHROCYTE [DISTWIDTH] IN BLOOD BY AUTOMATED COUNT: 12.4 % (ref 12.3–15.4)
GLOBULIN UR ELPH-MCNC: 3.3 GM/DL
GLUCOSE SERPL-MCNC: 82 MG/DL (ref 65–99)
HCT VFR BLD AUTO: 44.5 % (ref 34–46.6)
HGB BLD-MCNC: 14.6 G/DL (ref 12–15.9)
IMM GRANULOCYTES # BLD AUTO: 0.02 10*3/MM3 (ref 0–0.05)
IMM GRANULOCYTES NFR BLD AUTO: 0.3 % (ref 0–0.5)
LYMPHOCYTES # BLD AUTO: 1.67 10*3/MM3 (ref 0.7–3.1)
LYMPHOCYTES NFR BLD AUTO: 25.5 % (ref 19.6–45.3)
MCH RBC QN AUTO: 29.9 PG (ref 26.6–33)
MCHC RBC AUTO-ENTMCNC: 32.8 G/DL (ref 31.5–35.7)
MCV RBC AUTO: 91.2 FL (ref 79–97)
MONOCYTES # BLD AUTO: 0.35 10*3/MM3 (ref 0.1–0.9)
MONOCYTES NFR BLD AUTO: 5.4 % (ref 5–12)
NEUTROPHILS NFR BLD AUTO: 4.37 10*3/MM3 (ref 1.7–7)
NEUTROPHILS NFR BLD AUTO: 66.8 % (ref 42.7–76)
NRBC BLD AUTO-RTO: 0 /100 WBC (ref 0–0.2)
PLATELET # BLD AUTO: 213 10*3/MM3 (ref 140–450)
PMV BLD AUTO: 12.9 FL (ref 6–12)
POTASSIUM SERPL-SCNC: 4.4 MMOL/L (ref 3.5–5.2)
PROT SERPL-MCNC: 8 G/DL (ref 6–8.5)
RBC # BLD AUTO: 4.88 10*6/MM3 (ref 3.77–5.28)
SODIUM SERPL-SCNC: 137 MMOL/L (ref 136–145)
WBC NRBC COR # BLD: 6.54 10*3/MM3 (ref 3.4–10.8)

## 2022-03-08 PROCEDURE — 85025 COMPLETE CBC W/AUTO DIFF WBC: CPT | Performed by: PSYCHIATRY & NEUROLOGY

## 2022-03-08 PROCEDURE — 80053 COMPREHEN METABOLIC PANEL: CPT | Performed by: PSYCHIATRY & NEUROLOGY

## 2022-03-08 PROCEDURE — 25010000002 LORAZEPAM PER 2 MG: Performed by: PSYCHIATRY & NEUROLOGY

## 2022-03-08 PROCEDURE — 99232 SBSQ HOSP IP/OBS MODERATE 35: CPT | Performed by: PSYCHIATRY & NEUROLOGY

## 2022-03-08 RX ORDER — LORAZEPAM 1 MG/1
1 TABLET ORAL 3 TIMES DAILY
Start: 2022-03-08

## 2022-03-08 RX ORDER — FLUOXETINE HYDROCHLORIDE 40 MG/1
40 CAPSULE ORAL DAILY
Start: 2022-03-09

## 2022-03-08 RX ADMIN — LORAZEPAM 2 MG: 2 INJECTION INTRAMUSCULAR at 15:08

## 2022-03-08 RX ADMIN — LORAZEPAM 1 MG: 1 TABLET ORAL at 21:38

## 2022-03-08 NOTE — NURSING NOTE
Spoke with Jaime Palafox, Director of Psychiatry, [facility] concerning the requirement of labs, UA, Covid test on patient prior to admission to their facility.  Jaime did report that current blood work, UA, chest Xray, brain CT, EKG and Covid test where required prior to acceptance.  Reported to Lead RN Radha.  Lead RN to notify Dr. Beebe

## 2022-03-08 NOTE — NURSING NOTE
OFFERED PATIENT ORANGE JUICE, WATER AND LEMONADE  AND ENCOURAGED PATIENT TO DRINK AND EAT. PATIENT REFUSED. ENCOURAGED PATIENT TO TAKE MEDICATIONS, PATIENT REFUSED.

## 2022-03-08 NOTE — NURSING NOTE
Went into patient room to do nursing assessment. She was laying on floor with sheet up over her body and head. When I spoke with her she would cry and then laugh uncontrollably. I came back in a few minutes later, made her bed and told her to get up into the bed and get off of the floor. She complied. She reached for my hand and let me help her up into the bed. Later I went with med nurse and attempted to get her to take her night time meds. She sat up for us, but refused medication. She also refused snack, but did drink just a few sips of water. I brought her a sandwich, juices, water, offered popcorn. She declined all. I explained to her how important at least drinking fluids was. She laughed and then said no. Will continue to encourage intake. Pt did go into restroom, unsure if she used or not.

## 2022-03-08 NOTE — EXTERNAL PATIENT INSTRUCTIONS
Patient Education   Table of Contents       Managing Anxiety, Adult       Managing Depression, Adult       Suicidal Feelings: How to Help Yourself     To view videos and all your education online visit,   [email or web address]   or scan this QR code with your smartphone.                  Managing Anxiety, Adult     After being diagnosed with an anxiety disorder, you may be relieved to know why you have felt or behaved a certain way. You may also feel overwhelmed about the treatment ahead and what it will mean for your life. With care and support, you can manage this condition and recover from it.   How to manage lifestyle changes   Managing stress and anxiety      Stress is your body's reaction to life changes and events, both good and bad. Most stress will last just a few hours, but stress can be ongoing and can lead to more than just stress. Although stress can play a major role in anxiety, it is not the same as anxiety. Stress is usually caused by something external, such as a deadline, test, or competition. Stress normally passes after the triggering event has ended.   Anxiety is caused by something internal, such as imagining a terrible outcome or worrying that something will go wrong that will devastate you. Anxiety often does not go away even after the triggering event is over, and it can become long-term (chronic) worry. It is important to understand the differences between stress and anxiety and to manage your stress effectively so that it does not lead to an anxious response.    Talk with your health care provider or a counselor to learn more about reducing anxiety and stress. He or she may suggest tension reduction techniques, such as:       Music therapy. This can include creating or listening to music that you enjoy and that inspires you.       Mindfulness-based meditation. This involves being aware of your normal breaths while not trying to control your breathing. It can be done while sitting  or walking.       Centering prayer. This involves focusing on a word, phrase, or sacred image that means something to you and brings you peace.       Deep breathing. To do this, expand your stomach and inhale slowly through your nose. Hold your breath for 3?5 seconds. Then exhale slowly, letting your stomach muscles relax.       Self-talk. This involves identifying thought patterns that lead to anxiety reactions and changing those patterns.       Muscle relaxation. This involves tensing muscles and then relaxing them.      Choose a tension reduction technique that suits your lifestyle and personality. These techniques take time and practice. Set aside 5?15 minutes a day to do them. Therapists can offer counseling and training in these techniques. The training to help with anxiety may be covered by some insurance plans. Other things you can do to manage stress and anxiety include:       Keeping a stress/anxiety diary. This can help you learn what triggers your reaction and then learn ways to manage your response.       Thinking about how you react to certain situations. You may not be able to control everything, but you can control your response.       Making time for activities that help you relax and not feeling guilty about spending your time in this way.       Visual imagery and yoga can help you stay calm and relax.     Medicines    Medicines can help ease symptoms. Medicines for anxiety include:       Anti-anxiety drugs.       Antidepressants.     Medicines are often used as a primary treatment for anxiety disorder. Medicines will be prescribed by a health care provider. When used together, medicines, psychotherapy, and tension reduction techniques may be the most effective treatment.   Relationships   Relationships can play a big part in helping you recover. Try to spend more time connecting with trusted friends and family members. Consider going to couples counseling, taking family education classes, or going  to family therapy. Therapy can help you and others better understand your condition.     How to recognize changes in your anxiety    Everyone responds differently to treatment for anxiety. Recovery from anxiety happens when symptoms decrease and stop interfering with your daily activities at home or work. This may mean that you will start to:       Have better concentration and focus. Worry will interfere less in your daily thinking.       Sleep better.       Be less irritable.       Have more energy.       Have improved memory.     It is important to recognize when your condition is getting worse. Contact your health care provider if your symptoms interfere with home or work and you feel like your condition is not improving.   Follow these instructions at home:   Activity        Exercise. Most adults should do the following:       Exercise for at least 150 minutes each week. The exercise should increase your heart rate and make you sweat (moderate-intensity exercise).       Strengthening exercises at least twice a week.       Get the right amount and quality of sleep. Most adults need 7?9 hours of sleep each night.     Lifestyle            Eat a healthy diet that includes plenty of vegetables, fruits, whole grains, low-fat dairy products, and lean protein. Do not  eat a lot of foods that are high in solid fats, added sugars, or salt.       Make choices that simplify your life.      Do not  use any products that contain nicotine or tobacco, such as cigarettes, e-cigarettes, and chewing tobacco. If you need help quitting, ask your health care provider.       Avoid caffeine, alcohol, and certain over-the-counter cold medicines. These may make you feel worse. Ask your pharmacist which medicines to avoid.     General instructions         Take over-the-counter and prescription medicines only as told by your health care provider.       Keep all follow-up visits as told by your health care provider. This is important.        Where to find support    You can get help and support from these sources:       Self-help groups.       Online and community organizations.       A trusted spiritual leader.       Couples counseling.       Family education classes.       Family therapy.     Where to find more information    You may find that joining a support group helps you deal with your anxiety. The following sources can help you locate counselors or support groups near you:       Mental Health [place]: www.mentalhealthamerica.net         Anxiety and Depression Association of [place] (ADAA): www.adaa.org         National [place] on Mental Illness (RICARDA): www.ricarda.org       Contact a health care provider if you:         Have a hard time staying focused or finishing daily tasks.       Spend many hours a day feeling worried about everyday life.       Become exhausted by worry.       Start to have headaches, feel tense, or have nausea.       Urinate more than normal.       Have diarrhea.     Get help right away if you have:         A racing heart and shortness of breath.       Thoughts of hurting yourself or others.     If you ever feel like you may hurt yourself or others, or have thoughts about taking your own life, get help right away. You can go to your nearest emergency department or call:      Your local emergency services (911 in the [place]      A suicide crisis helpline, such as the National Suicide Prevention Lifeline at 1-501.942.4553. This is open 24 hours a day.     Summary         Taking steps to learn and use tension reduction techniques can help calm you and help prevent triggering an anxiety reaction.       When used together, medicines, psychotherapy, and tension reduction techniques may be the most effective treatment.       Family, friends, and partners can play a big part in helping you recover from an anxiety disorder.     This information is not intended to replace advice given to you by your health care provider. Make  sure you discuss any questions you have with your health care provider.     Document Released: 12/12/2017Document Revised: 05/19/2020Document Reviewed: 05/19/2020     [employer] Patient Education ? 2022 [employer]         Managing Depression, Adult     Depression is a mental health condition that affects your thoughts, feelings, and actions. Being diagnosed with depression can bring you relief if you did not know why you have felt or behaved a certain way. It could also leave you feeling overwhelmed with uncertainty about your future. Preparing yourself to manage your symptoms can help you feel more positive about your future.   How to manage lifestyle changes   Managing stress      Stress is your body's reaction to life changes and events, both good and bad. Stress can add to your feelings of depression. Learning to manage your stress can help lessen your feelings of depression.    Try some of the following approaches to reducing your stress (stress reduction techniques):       Listen to music that you enjoy and that inspires you.       Try using a meditation mandy or take a meditation class.       Develop a practice that helps you connect with your spiritual self. Walk in nature, pray, or go to a place of Orthodoxy.       Do some deep breathing. To do this, inhale slowly through your nose. Pause at the top of your inhale for a few seconds and then exhale slowly, letting your muscles relax.       Practice yoga to help relax and work your muscles.      Choose a stress reduction technique that suits your lifestyle and personality. These techniques take time and practice to develop. Set aside 5?15 minutes a day to do them. Therapists can offer training in these techniques. Other things you can do to manage stress include:       Keeping a stress diary.       Knowing your limits and saying no when you think something is too much.       Paying attention to how you react to certain situations. You may not be able to control  everything, but you can change your reaction.       Adding humor to your life by watching funny films or TV shows.       Making time for activities that you enjoy and that relax you.     Medicines    Medicines, such as antidepressants, are often a part of treatment for depression.       Talk with your pharmacist or health care provider about all the medicines, supplements, and herbal products that you take, their possible side effects, and what medicines and other products are safe to take together.       Make sure to report any side effects you may have to your health care provider.     Relationships   Your health care provider may suggest family therapy, couples therapy, or individual therapy as part of your treatment.     How to recognize changes    Everyone responds differently to treatment for depression. As you recover from depression, you may start to:       Have more interest in doing activities.       Feel less hopeless.       Have more energy.       Overeat less often, or have a better appetite.       Have better mental focus.      It is important to recognize if your depression is not getting better or is getting worse. The symptoms you had in the beginning may return, such as:       Tiredness (fatigue) or low energy.       Eating too much or too little.       Sleeping too much or too little.       Feeling restless, agitated, or hopeless.       Trouble focusing or making decisions.       Unexplained physical complaints.       Feeling irritable, angry, or aggressive.     If you or your family members notice these symptoms coming back, let your health care provider know right away.   Follow these instructions at home:   Activity            Try to get some form of exercise each day, such as walking, biking, swimming, or lifting weights.       Practice stress reduction techniques.       Engage your mind by taking a class or doing some volunteer work.     Lifestyle         Get the right amount and quality of  sleep.       Cut down on using caffeine, tobacco, alcohol, and other potentially harmful substances.       Eat a healthy diet that includes plenty of vegetables, fruits, whole grains, low-fat dairy products, and lean protein. Do not  eat a lot of foods that are high in solid fats, added sugars, or salt (sodium).     General instructions         Take over-the-counter and prescription medicines only as told by your health care provider.       Keep all follow-up visits as told by your health care provider. This is important.       Where to find support   Talking to others      Friends and family members can be sources of support and guidance. Talk to trusted friends or family members about your condition. Explain your symptoms to them, and let them know that you are working with a health care provider to treat your depression. Tell friends and family members how they also can be helpful.   Finances         Find appropriate mental health providers that fit with your financial situation.       Talk with your health care provider about options to get reduced prices on your medicines.       Where to find more information    You can find support in your area from:       Anxiety and Depression Association of [place] (ADAA): www.adaa.org         Mental Health [place]: www.mentalhealthamerica.net         National [place] on Mental Illness: www.ramon.org       Contact a health care provider if:        You stop taking your antidepressant medicines, and you have any of these symptoms:       Nausea.       Headache.       Light-headedness.       Chills and body aches.       Not being able to sleep (insomnia).       You or your friends and family think your depression is getting worse.     Get help right away if:         You have thoughts of hurting yourself or others.     If you ever feel like you may hurt yourself or others, or have thoughts about taking your own life, get help right away. Go to your nearest emergency department  or:      Call your local emergency services (620 in the [place]      Call a suicide crisis helpline, such as the National Suicide Prevention Lifeline at 1-174.235.1819. This is open 24 hours a day in the [place]      Text the Crisis Text Line at 139925 (in the [place]     Summary         If you are diagnosed with depression, preparing yourself to manage your symptoms is a good way to feel positive about your future.       Work with your health care provider on a management plan that includes stress reduction techniques, medicines (if applicable), therapy, and healthy lifestyle habits.       Keep talking with your health care provider about how your treatment is working.      If you have thoughts about taking your own life, call a suicide crisis helpline or text a crisis text line.     This information is not intended to replace advice given to you by your health care provider. Make sure you discuss any questions you have with your health care provider.     Document Released: 11/20/2017Document Revised: 10/28/2020Document Reviewed: 10/28/2020     [employer] Patient Education ? 2022 [employer]         Suicidal Feelings: How to Help Yourself     Suicide is when you end your own life. There are many things you can do to help yourself feel better when struggling with these feelings. Many services and people are available to support you and others who struggle with similar feelings.   If you ever feel like you may hurt yourself or others, or have thoughts about taking your own life, get help right away. To get help:      Call your local emergency services (801 in the [place]      The [employer] health and human services helpline (211 in the [place]      Go to your nearest emergency department.      Call a suicide hotline to speak with a trained counselor.  The following suicide hotlines are available in the [place]:       1-270-366-TALK (1-624.168.1321).       8-882-ZCRRCSL (1-655.677.1114).       1-734.396.8437. This  is a hotline for Swedish speakers.       1-727.442.2049. This is a hotline for TTY users.       7-129-4-UNICO (1-273.850.3842). This is a hotline for lesbian, stokes, bisexual, transgender, or questioning youth.       For a list of hotlines in [place], visit www.suicide.org/hotlines/international/slmzjx-ajomfze-rxtbhojg.html        Contact a crisis center or a local suicide prevention center.  To find a crisis center or suicide prevention center:       Call your local hospital, clinic, community service organization, mental health center, social service provider, or health department. Ask for help with connecting to a crisis center.       For a list of crisis centers in the [place], visit: suicidepreventionlifeline.org         For a list of crisis centers in [place], visit: suicideprevention.ca       How to help yourself feel better            Promise yourself that you will not do anything extreme when you have suicidal feelings. Remember the times you have felt hopeful. Many people have gotten through suicidal thoughts and feelings, and you can too. If you have had these feelings before, remind yourself that you can get through them again.       Let family, friends, teachers, or counselors know how you are feeling. Try not to separate yourself from those who care about you and want to help you. Talk with someone every day, even if you do not feel sociable. Face-to-face conversation is best to help them understand your feelings.       Contact a mental health care provider and work with this person regularly.       Make a safety plan that you can follow during a crisis. Include phone numbers of suicide prevention hotlines, mental health professionals, and trusted friends and family members you can call during an emergency. Save these numbers on your phone.       If you are thinking of taking a lot of medicine, give your medicine to someone who can give it to you as prescribed. If you are on antidepressants and  are concerned you will overdose, tell your health care provider so that he or she can give you safer medicines.       Try to stick to your routines and follow a schedule every day. Make self-care a priority.       Make a list of realistic goals, and cross them off when you achieve them. Accomplishments can give you a sense of worth.       Wait until you are feeling better before doing things that you find difficult or unpleasant.       Do things that you have always enjoyed to take your mind off your feelings. Try reading a book, or listening to or playing music. Spending time outside, in nature, may help you feel better.       Follow these instructions at home:            Visit your primary health care provider every year for a checkup.       Work with a mental health care provider as needed.       Eat a well-balanced diet, and eat regular meals.       Get plenty of rest.       Exercise if you are able. Just 30 minutes of exercise each day can help you feel better.       Take over-the-counter and prescription medicines only as told by your health care provider. Ask your mental health care provider about the possible side effects of any medicines you are taking.      Do not  use alcohol or drugs, and remove these substances from your home.       Remove weapons, poisons, knives, and other deadly items from your home.       General recommendations         Keep your living space well lit.       When you are feeling well, write yourself a letter with tips and support that you can read when you are not feeling well.       Remember that life's difficulties can be sorted out with help. Conditions can be treated, and you can learn behaviors and ways of thinking that will help you.     Where to find more information         National Suicide Prevention Lifeline: www.suicidepreventionlifeline.org         Hopeline: www.hopeline.com         American Foundation for Suicide Prevention: www.afsp.org         The [place] Project (for  lesbian, stokes, bisexual, transgender, or questioning youth): www.thetrevorproject.org         National [place] of Mental Health: https://www.nimh.nih.gov/health/topics/suicide-prevention       Contact a health care provider if:         You feel as though you are a burden to others.       You feel agitated, angry, vengeful, or have extreme mood swings.       You have withdrawn from family and friends.     Get help right away if:         You are talking about suicide or wishing to die.       You start making plans for how to commit suicide.       You feel that you have no reason to live.       You start making plans for putting your affairs in order, saying goodbye, or giving your possessions away.       You feel guilt, shame, or unbearable pain, and it seems like there is no way out.       You are frequently using drugs or alcohol.       You are engaging in risky behaviors that could lead to death.     If you have any of these symptoms, get help right away. Call emergency services, go to your nearest emergency department or crisis center, or call a suicide crisis helpline.   Summary         Suicide is when you take your own life.       Promise yourself that you will not do anything extreme when you have suicidal feelings.       Let family, friends, teachers, or counselors know how you are feeling.       Get help right away if you start making plans for how to commit suicide.     This information is not intended to replace advice given to you by your health care provider. Make sure you discuss any questions you have with your health care provider.     Document Released: 06/23/2004Document Revised: 09/01/2021Document Reviewed: 09/03/2021     [employer] Patient Education ? 2022 [employer]        Abdomen soft, non-tender, no guarding.

## 2022-03-08 NOTE — DISCHARGE SUMMARY
"  Date of Discharge:  3/8/2022    Discharge Diagnosis:Principal Problem:    Schizoaffective disorder, depressive type ([facility])  Active Problems:    Anorexia nervosa        Presenting Problem/History of Present Illness:Patient was admitted to the hospital on February 7 having presented catatonic, voluntarily admitted for safety evaluation and treatment, see admission note for details.         Hospital Course:  Patient was admitted for safety and stabilization and was placed on standard precautions.  Routine labs were checked.  Patient was assigned a masters level therapist and provided with an opportunity to participate in group and individual therapy on the unit.  Patient seen on a daily basis for evaluation and supportive therapy.  Patient's hospitalization characterized by extended periods of catatonic posturing and refusing to eat or drink or take medication.  There has been a series of several days at a time where she would eat and drink minimally and take p.o. medications which have included fluoxetine and lorazepam refusing to take the aripiprazole.  During the majority of hospital stay patient would be \"catatonic-like\".  Patient did respond to the IM Ativan at times, would resume eating and drinking and taking medications orally but unfortunately sporadically.  Patient's mother was involved in the treatment process and at times came to the hospital to encourage the patient to eat and drink and participate in the treatment process.  This was only minimally effective.  On February 15 patient's mother Gisela Hyde pursued guardianship and it was clarified with  that we could administer treatment for 14 days following the directives of the parent/guardian.  Patient's father was also contacted by phone and endorsed our treatment approach and plan.  Further interview with the mother clarified the patient's history of prolonged preoccupation with food and body image since age of 14 or 15 following " the death of the patient's stepfather.  She has had intermittent periods when she be more depressed but the primary focus had been her preoccupation with food and body weight.  This seemed to follow-up a classic pattern for anorexia nervosa.  This culminated in a suicide attempt summer 2021 followed by 3 psychiatric hospitalizations prior to her admission here.  This included 1 month stay at [facility] in [facility].  Patient was given option of taking the medication orally which she did most the time but did receive several doses of the IM Ativan.  During the week of  she seemed to improve but was never clear of inappropriate mood variation with inappropriate laughter and a extremely prolonged latency response ostensibly representing thought blocking.  Patient also seem to be very anxious.  During this time she was taking medications daily but continued to refuse an SGA.  At times patient was noted to be experiencing auditory hallucinations.  The 14-day allowance for treatment per parent/guardian consent  on .  Patient status remains relatively unchanged until  or  and since that point seems to have deteriorated.  Very erratic bizarre behavior and abrupt episodes between periods of catatonia and refusal to eat, drink or take medication on  and .  This unfortunately there is no option but to involuntarily commit to a state facility ([facility]) where court order for treatment can be obtained.    Patient is medically stable for transfer, will attempt a Zoom evaluation with the [facility] health staff to help facilitate in her safe transfer to the [facility] facility.  Patient was refusing updated lab work as well as EKG.    Consults:   Consults     Date and Time Order Name Status Description    2022  3:29 PM Inpatient Hospitalist Consult Completed           Labs:  Lab Results (all)     Procedure Component Value Units Date/Time     Prealbumin [[id number]]  (Normal) Collected: 02/25/22 1349    Specimen: Blood Updated: 02/26/22 0439     Prealbumin 20.8 mg/dL     Comprehensive Metabolic Panel [[id number]]  (Abnormal) Collected: 02/25/22 1349    Specimen: Blood Updated: 02/25/22 1501     Glucose 110 mg/dL      BUN 11 mg/dL      Creatinine 0.81 mg/dL      Sodium 142 mmol/L      Potassium 3.5 mmol/L      Chloride 104 mmol/L      CO2 27.2 mmol/L      Calcium 8.7 mg/dL      Total Protein 6.2 g/dL      Albumin 4.16 g/dL      ALT (SGPT) 8 U/L      AST (SGOT) 21 U/L      Alkaline Phosphatase 67 U/L      Total Bilirubin 0.4 mg/dL      eGFR Non African Amer 87 mL/min/1.73      Globulin 2.0 gm/dL      A/G Ratio 2.0 g/dL      BUN/Creatinine Ratio 13.6     Anion Gap 10.8 mmol/L     Narrative:      GFR Normal >60  Chronic Kidney Disease <60  Kidney Failure <15      Phosphorus [[id number]]  (Abnormal) Collected: 02/25/22 1349    Specimen: Blood Updated: 02/25/22 1501     Phosphorus 2.2 mg/dL     Basic Metabolic Panel [[id number]]  (Abnormal) Collected: 02/12/22 1305    Specimen: Blood Updated: 02/12/22 1633     Glucose 109 mg/dL      BUN 22 mg/dL      Creatinine 0.90 mg/dL      Sodium 143 mmol/L      Potassium 3.8 mmol/L      Chloride 104 mmol/L      CO2 26.8 mmol/L      Calcium 9.4 mg/dL      eGFR Non African Amer 77 mL/min/1.73      BUN/Creatinine Ratio 24.4     Anion Gap 12.2 mmol/L     Narrative:      GFR Normal >60  Chronic Kidney Disease <60  Kidney Failure <15      Urinalysis With Microscopic If Indicated (No Culture) - Urine, Clean Catch [[id number]]  (Abnormal) Collected: 02/10/22 1839    Specimen: Urine, Clean Catch Updated: 02/10/22 1943     Color, UA Dark Yellow     Appearance, UA Cloudy     pH, UA 6.0     Specific Gravity, UA >=1.030     Glucose, UA Negative     Ketones, UA 15 mg/dL (1+)     Bilirubin, UA Small (1+)     Blood, UA Negative     Protein, UA Trace     Leuk Esterase, UA Negative     Nitrite, UA Negative     Urobilinogen, UA 1.0 E.U./dL     Narrative:      Urine microscopic not indicated.    Urine Drug Screen - Urine, Clean Catch [[id number]]  (Normal) Collected: 02/10/22 1839    Specimen: Urine, Clean Catch Updated: 02/10/22 1905     THC, Screen, Urine Negative     Phencyclidine (PCP), Urine Negative     Cocaine Screen, Urine Negative     Methamphetamine, Ur Negative     Opiate Screen Negative     Amphetamine Screen, Urine Negative     Benzodiazepine Screen, Urine Negative     Tricyclic Antidepressants Screen Negative     Methadone Screen, Urine Negative     Barbiturates Screen, Urine Negative     Oxycodone Screen, Urine Negative     Propoxyphene Screen Negative     Buprenorphine, Screen, Urine Negative    Narrative:      Cutoff For Drugs Screened:    Amphetamines               500 ng/ml  Barbiturates               200 ng/ml  Benzodiazepines            150 ng/ml  Cocaine                    150 ng/ml  Methadone                  200 ng/ml  Opiates                    100 ng/ml  Phencyclidine               25 ng/ml  THC                            50 ng/ml  Methamphetamine            500 ng/ml  Tricyclic Antidepressants  300 ng/ml  Oxycodone                  100 ng/ml  Propoxyphene               300 ng/ml  Buprenorphine               10 ng/ml    The normal value for all drugs tested is negative. This report includes unconfirmed screening results, with the cutoff values listed, to be used for medical treatment purposes only.  Unconfirmed results must not be used for non-medical purposes such as employment or legal testing.  Clinical consideration should be applied to any drug of abuse test, particularly when unconfirmed results are used.      Pregnancy, Urine - Urine, Clean Catch [[id number]]  (Normal) Collected: 02/10/22 1839    Specimen: Urine, Clean Catch Updated: 02/10/22 1857     HCG, Urine QL Negative    Narrative:      Diluted specimens may cause false negative results.    TSH [[id number]]  (Normal) Collected: 02/08/22 1455    Specimen: Blood from Arm,  Right Updated: 02/08/22 1620     TSH 0.616 uIU/mL     hCG, Serum, Qualitative [[id number]]  (Normal) Collected: 02/07/22 1236    Specimen: Blood Updated: 02/07/22 1956     HCG Qualitative Negative          Imaging:  Imaging Results (All)     None          Condition on Discharge:  with unresolved issues    Prognosis: Poor    Vital Signs  Temp:  [98.3 °F (36.8 °C)] 98.3 °F (36.8 °C)  Heart Rate:  [69] 69  Resp:  [20] 20  BP: (104)/(76) 104/76    Discharge Disposition  Psychiatric Hospital or Unit (DC - External)    Discharge Medications     Discharge Medications      New Medications      Instructions Start Date   FLUoxetine 40 MG capsule  Commonly known as: PROzac   40 mg, Oral, Daily   Start Date: March 9, 2022     LORazepam 1 MG tablet  Commonly known as: ATIVAN   1 mg, Oral, 3 Times Daily             Discharge Diet: Special situation    Activity at Discharge: No restrictions    Follow-up Appointments:    Patient to be committed to an [facility] to facilitate court ordered treatment.      Russ Beebe MD  03/08/22  10:15 EST  Time spent with the discharge process >30 minutes.     Dictated utilizing Dragon dictation

## 2022-03-08 NOTE — PLAN OF CARE
DATA:    Therapist discussed case with Dr. Beebe and met with patient today to review coping skills, review plan of care, and discuss discharge.    0900: Therapist called [facility] to check on the status of the petition.  reports they sent the petition to the  and are waiting for her to sign it.     1210: Signed petition returned to lead RN; Lead faxed papers to [facility].     1300: Therapist received call from dispatch asking about the status of the petition. Dispatch reports the transport officers are at a murder trial and are sworn in currently, so they wouldn't be able to transport until later in the day or in the morning.     1310: Therapist called [facility] to ask about the policy regarding transfer since patient is refusing labs due to psychosis. [facility] reports they will find out how to proceed and call this therapist back with an update.     Therapist received call from [facility]. They report that the patient will have to have required labs, urinalysis, covid test, and EKG before she can be accepted into another facility. They report patient does meet criteria for transfer.     1400: Therapist spoke with Zahra over speaker phone with LUX Garcia and student intern present. Therapist provided Zahra with an update on patient's decline and informed her that patient has been accepted for transfer, but the accepting facility cannot take her without the required labs. Therapist clarified that blood work, urine, an ekg, and a covid test must be obtained. Therapist informed Zahra that treatment team will attempt getting the patient to cooperate with these things voluntarily, but if she does not it will require a brief hold. Therapist and RN explained the steps taken during a brief hold. Therapist informed Zahra that to get a urinalysis, the patient will require a catheter. Therapist asked Zahra if she consents to a brief hold if needed to collect required labs, covid test, urinalysis,  and EKG. Zahra states the treatment team has her consent to move forward with a brief hold if it is needed.     1415: Therapist spoke with patient's father, . Therapist provided  with an update on patient's status and on the status of the petition. Therapist explained the need for labs, etc. To  also. He is agreeable and states we should do whatever is required for the patient's health and safety. He requests updates.    1500: Therapist present with patient while she received Ativan IM. Patient received injection willingly. RN staff continue to do a great job encouraging oral intake.    1600: Therapist spoke with Albino Henry with the [facility] office to provide an update. He reports it will likely be in the morning before they can transport patient to an accepting facility.     1630: Therapist notified patient's guardian/mother and her father of patient's progress and the status of the petition. Therapist informed them that Dr. Beebe received approval from Dr. Echevarria to provide blood work only, so patient did not require any additional tests. Therapist informed patient's parents that she voluntarily took Ativan IM and soon thereafter drank 1 cup of water and ate a snack.        Clinical Maneuvering/Intervention:     Therapist assisted patient in processing above session content; acknowledged and normalized patient’s thoughts, feelings, and concerns.  Discussed the therapist/patient relationship and explain the parameters and limitations of relative confidentiality.  Also discussed the importance of active participation, and honesty to the treatment process.  Encouraged the patient to discuss/vent their feelings, frustrations, and fears concerning their ongoing medical issues and validated their feelings.     Allowed patient to freely discuss issues without interruption or judgment. Provided safe, confidential environment to facilitate the development of positive therapeutic relationship and encourage  open, honest communication.      Therapist addressed discharge safety planning this date. Assisted patient in identifying risk factors which would indicate the need for higher level of care after discharge;  including thoughts to harm self or others and/or self-harming behavior. Encouraged patient to call 911, or present to the nearest emergency room should any of these events occur. Discussed crisis intervention services and means to access.  Encouraged securing any objects of harm.    Therapist completed integrated summary, treatment plan, and initiated social history this date.  Therapist is strongly encouraging family involvement in treatment.       Encouraged mask wearing, social distancing, and regular hand washing due to COVID19 risk.      ASSESSMENT:      Therapist met 1:1 with patient today. Patient continues to decline. Patient appears to be responding to internal stimuli, evidenced by her speaking to things that are not there, inappropriate laughter, and reacting to things not in the room. Patient appears surprised by things at times. Patient with labile mood, shifting between uncontrollable laughter and crying. Patient reports being in pain and feeling shaky, likely associated with no oral intake besides two sips of water since 3/6. Patient unable to form a coherent sentence.      PLAN:       Patient to remain hospitalized this date.      Treatment team will focus efforts on stabilizing patient's acute symptoms while providing education on healthy coping and crisis management to reduce hospitalizations.   Patient requires daily psychiatrist evaluation and 24/7 nursing supervision to promote patient  safety.     Therapist will offer 1-4 individual sessions, 1 therapy group daily, family education, and appropriate referral.

## 2022-03-08 NOTE — NURSING NOTE
SPOKE TO DR ZHAO WHO STATES HE SPOKE DIRECTLY WITH DR. BAE AND THE ONLY REQUIRED LAB/PREPROCEDUCRE TESTING FOR ADMISSION WILL BE THE BLOOD WORK CBC/CMP. INFORMATION WAS RELAYED TO THE LAURENT, PRIMARY RN AS WELL AS TALIA GANNON, DIRECTOR.

## 2022-03-08 NOTE — PROGRESS NOTES
The [facility] requiring CBC, BMP, UA, CAT and chest x-ray. Our dilemma is that this likely have to be forced on the patient, especially the UA that would be very traumatic for the patient and Is not clinically indicated.  Nursing informs me that the admitting staff person at [facility] will not listen to reason regarding patient's clinical state as well as requiring us to force this on the patient at this time without really having the authority do so.  Have asked to talk to the physician making these requirements but they will not give us his phone number but gave my number for he or she to call.  Have also notified risk-management l about the situation.  Patient vital signs are stable no indication of any immediate medical crisis in my professional she is stable to be transferred to their hospital on involuntary status which along with a court order to authorize treatment the patient refuses.      Called the hospital and talked directly to Dr. Echevarria.  Explained the situation to him and he was quite reasonable stating that he really would be satisfied with just a CBC BMP that we will do stat.

## 2022-03-08 NOTE — NURSING NOTE
PATIENT ENTERED THE SHOWER AND IS SITTING ON THE SHOWER HANDRAIL. PATIENT REFUSES TO BE REDIRECTED. SHOWER CURTAIN WAS REMOVED AND PATIENT WILL BE CHECKED ON EVERY 15 MINUTES PER PROTOCOL.

## 2022-03-08 NOTE — NURSING NOTE
Pt refused pm dose of ativan.  Pt did take one sip of water. Encouraged fluids at this time. Will continue to monitor.

## 2022-03-08 NOTE — NURSING NOTE
PRESENT WITH NICHO QUINTANILLA, PTS PRIMARY THERAPIST, THROUGHOUT TELEPHONE CONFERENCE WITH PATIENTS MOTHER/GUARDIAN JENNIFER. JENNIFER VERBALIZED CONSENT TO OBTAIN LABS/PROCEDURESTESTING NECESSARY TO FACILITATE TRANSFER OF PATIENT TO A HIGHER LEVEL OF CARE. ALL QUESTIONS AND CONCERNS ADDRESSED. JENNIFER VERBALIZED UNDERSTAND AT THIS TIME.

## 2022-03-08 NOTE — PLAN OF CARE
Goal Outcome Evaluation:  Plan of Care Reviewed With: patient  Patient Agreement with Plan of Care: agrees     See piror notes

## 2022-03-09 ENCOUNTER — APPOINTMENT (OUTPATIENT)
Dept: CT IMAGING | Facility: HOSPITAL | Age: 27
End: 2022-03-09

## 2022-03-09 VITALS
RESPIRATION RATE: 18 BRPM | WEIGHT: 105.5 LBS | OXYGEN SATURATION: 99 % | DIASTOLIC BLOOD PRESSURE: 56 MMHG | HEART RATE: 63 BPM | HEIGHT: 66 IN | SYSTOLIC BLOOD PRESSURE: 91 MMHG | TEMPERATURE: 97.7 F | BODY MASS INDEX: 16.95 KG/M2

## 2022-03-09 PROCEDURE — 99232 SBSQ HOSP IP/OBS MODERATE 35: CPT | Performed by: PSYCHIATRY & NEUROLOGY

## 2022-03-09 PROCEDURE — 25010000002 LORAZEPAM PER 2 MG: Performed by: PSYCHIATRY & NEUROLOGY

## 2022-03-09 RX ADMIN — FLUOXETINE HYDROCHLORIDE 40 MG: 20 CAPSULE ORAL at 08:35

## 2022-03-09 RX ADMIN — LORAZEPAM 2 MG: 2 INJECTION INTRAMUSCULAR at 11:22

## 2022-03-09 RX ADMIN — LORAZEPAM 1 MG: 1 TABLET ORAL at 08:36

## 2022-03-09 NOTE — NURSING NOTE
Patient will be going to [facility] [address].  Admission diagnosis is Catatonia.  Accepting Dr. Echevarria.  Nurse will need to call report once lead nurse has transportation set-up.

## 2022-03-09 NOTE — NURSING NOTE
1135 PATIENT RECEIVED 2MG OF ATIVAN IM LEFT ARM. 1140 PATIENT REFUSED V/S. 1140 PATIENT WAS ESCORTED BY SECURITY AND STAFF OFF OF [facility] AND DELIVERED TO [facility] DEPARTMENT STAFF. PATIENT WAS ESCORTED BY SECURITY, STAFF AND  DEPUTY, VIA WHEELCHAIR, TO POLICE CAR. PATIENT WAS COOPERATIVE, AOX3 WITH NO S/S OF ACUTE DISTRESS NOTED. PATIENTS BELONGINGS WERE PLACED IN TRUNK OF POLICE CAR.

## 2022-03-09 NOTE — PLAN OF CARE
Goal Outcome Evaluation:  Plan of Care Reviewed With: patient  Patient Agreement with Plan of Care: refuses to participate     Progress: no change  Outcome Evaluation: Patient refuses to participate in assessment.  Patient took nightly medicine and ate snack.

## 2022-03-09 NOTE — PROGRESS NOTES
"INPATIENT PSYCHIATRIC PROGRESS NOTE    Name:  Kingsley Grimm  :  1995  MRN:  [id number]  Visit Number:  [id number]  Length of stay:  30    Behavioral Health Treatment Plan and Problem List: I have reviewed and approved the Behavioral Health Treatment Plan and Problem list.    SUBJECTIVE    CC/ Focus of exam: anorexia/ depressed/psychotic    Patient's subjective status: \"Reading\"    INTERVAL HISTORY: This morning patient has taken her medication and eating possibly 25% of breakfast.  Attempts at interview and encouragement met with inappropriate smiles and disconnected responses indicating continuing abnormal thought content and associations.  Unfortunately patient unable to process the current situation nor make a commitment towards treatment here.  We will proceed with the involuntary commitment to facilitate court ordered treatment.         ROS: No cardiovascular, GI, or Neurological complaints.       OBJECTIVE    Vitals:    22 0748   BP: 91/56   Pulse: 63   Resp: 18   Temp: 97.7 °F (36.5 °C)   SpO2: 99%      Temp:  [96.2 °F (35.7 °C)-97.7 °F (36.5 °C)] 97.7 °F (36.5 °C)  Heart Rate:  [63-81] 63  Resp:  [18] 18  BP: ()/(56-69) 91/56    MENTAL STATUS EXAM:         Psychomotor: No psychomotor agitation/retardation, No EPS, No motor tics  Speech-normal rate, amount.  Mood/Affect:  Inappropriate  Thought Processes:  tangential  Thought Content:  bizarre  Hallucination(s): auditory  Hopelessness: Unable to determine  Optimistic:Unable to determine, Yes, No and minimally  Suicidal Thoughts:   None apparent  Suicidal Plan/Intent:  No  Homicidal Thoughts:  absent  Orientation: Person  Memory: Unable to determine although patient does not seem to be confused or disoriented grossly do not suspect a cognitive issue    Lab Results (last 24 hours)     Procedure Component Value Units Date/Time    Comprehensive Metabolic Panel [[id number]]  (Abnormal) Collected: 22 1551    Specimen: Blood Updated: " 03/08/22 1633     Glucose 82 mg/dL      BUN 13 mg/dL      Creatinine 0.82 mg/dL      Sodium 137 mmol/L      Potassium 4.4 mmol/L      Comment: Slight hemolysis detected by analyzer. Results may be affected.        Chloride 101 mmol/L      CO2 16.5 mmol/L      Calcium 9.7 mg/dL      Total Protein 8.0 g/dL      Albumin 4.74 g/dL      ALT (SGPT) 7 U/L      AST (SGOT) 20 U/L      Alkaline Phosphatase 76 U/L      Total Bilirubin 1.2 mg/dL      Globulin 3.3 gm/dL      A/G Ratio 1.5 g/dL      BUN/Creatinine Ratio 15.9     Anion Gap 19.5 mmol/L      eGFR 102.6 mL/min/1.73      Comment: National Kidney Foundation and American Society of Nephrology (ASN) Task Force recommended calculation based on the Chronic Kidney Disease Epidemiology Collaboration (CKD-EPI) equation refit without adjustment for race.       Narrative:      GFR Normal >60  Chronic Kidney Disease <60  Kidney Failure <15      CBC & Differential [[id number]]  (Abnormal) Collected: 03/08/22 1551    Specimen: Blood Updated: 03/08/22 1605    Narrative:      The following orders were created for panel order CBC & Differential.  Procedure                               Abnormality         Status                     ---------                               -----------         ------                     CBC Auto Differential[[id number]]        Abnormal            Final result                 Please view results for these tests on the individual orders.    CBC Auto Differential [[id number]]  (Abnormal) Collected: 03/08/22 1551    Specimen: Blood Updated: 03/08/22 1605     WBC 6.54 10*3/mm3      RBC 4.88 10*6/mm3      Hemoglobin 14.6 g/dL      Hematocrit 44.5 %      MCV 91.2 fL      MCH 29.9 pg      MCHC 32.8 g/dL      RDW 12.4 %      RDW-SD 41.6 fl      MPV 12.9 fL      Platelets 213 10*3/mm3      Neutrophil % 66.8 %      Lymphocyte % 25.5 %      Monocyte % 5.4 %      Eosinophil % 0.9 %      Basophil % 1.1 %      Immature Grans % 0.3 %      Neutrophils, Absolute 4.37  10*3/mm3      Lymphocytes, Absolute 1.67 10*3/mm3      Monocytes, Absolute 0.35 10*3/mm3      Eosinophils, Absolute 0.06 10*3/mm3      Basophils, Absolute 0.07 10*3/mm3      Immature Grans, Absolute 0.02 10*3/mm3      nRBC 0.0 /100 WBC            Imaging Results (Last 24 Hours)     ** No results found for the last 24 hours. **           ECG/EMG Results (most recent)     Procedure Component Value Units Date/Time    ECG 12 Lead [[id number]] Collected: 02/12/22 1008     Updated: 02/12/22 2235     QT Interval 396 ms      QTC Interval 436 ms     Narrative:      Test Reason : Baseline Cardiac Status  Blood Pressure :   */*   mmHG  Vent. Rate :  73 BPM     Atrial Rate :  73 BPM     P-R Int : 124 ms          QRS Dur :  82 ms      QT Int : 396 ms       P-R-T Axes :  21  79   9 degrees     QTc Int : 436 ms    Normal sinus rhythm  Normal ECG  No previous ECGs available  Confirmed by Didi Castro ([id number]) on 2/12/2022 10:35:03 PM    Referred By: PAVEL           Confirmed By: Didi Castro    ECG 12 Lead [[id number]] Collected: 02/25/22 1729     Updated: 02/26/22 0914     QT Interval 390 ms      QTC Interval 417 ms     Narrative:      Test Reason : monitoring  Blood Pressure :   */*   mmHG  Vent. Rate :  69 BPM     Atrial Rate :  69 BPM     P-R Int : 136 ms          QRS Dur :  84 ms      QT Int : 390 ms       P-R-T Axes :  51  80  31 degrees     QTc Int : 417 ms    Normal sinus rhythm  Normal ECG  When compared with ECG of 12-FEB-2022 10:08,  No significant change was found  Confirmed by Rene Clark ([id number]) on 2/26/2022 9:13:02 AM    Referred By:            Confirmed By: Rene Clark           ALLERGIES: Patient has no known allergies.    Medications:     FLUoxetine, 40 mg, Oral, Daily  LORazepam, 1 mg, Oral, TID  sodium chloride, 1,000 mL, Intravenous, Once       ASSESSMENT & PLAN    Major depressive disorder, severe, recurrent, with psychotic features  Patient to be transferred to [facility] via  Involuntary Petition later today.    -    Anorexia nervosa        Special precautions: Special Precautions Level 3 (q15 min checks)     Behavioral Health Treatment Plan and Problem List: I have reviewed and approved the Behavioral Health Treatment Plan and Problem list.    I spent a total of 26 minutes in direct patient care including  17 minutes face to face with the patient assessment, coordination of care, and counseling the patient on the current and follow-up treatment plans regarding her status and situation.  Patient had no additional questions.     MIREYA Beebe MD    Clinician:  Russ Beebe MD  03/09/22  09:06 EST    Dictated utilizing Dragon dictation

## 2022-03-09 NOTE — NURSING NOTE
SPOKE WITH JENNIFER HENDRICKSON, MOTHER, AND INFORMED HER THAT THE PATIENT HAD BEEN TRANSFERRED TO [facility]. THE MOTHER EXPRESSED NO CONCERNS AT THIS TIME.    1990

## 2023-06-14 NOTE — NURSING NOTE
ON TREATMENT NOTE  RADIATION ONCOLOGY DEPARTMENT    Patient name:  Alexus Saldaña    Primary Physician:  Marcela Montana M.D. MRN: [id number]  CSN: [id number]   Referring physician:  Mikayla Galaviz M.D. : 1965, 58 y.o.     ENCOUNTER DATE:  23    DIAGNOSIS:    Breast cancer of upper-outer quadrant of left female breast (HCC)  Staging form: Breast, AJCC 8th Edition  - Pathologic: Stage IB (pT1a, pN0(sn), cM0, G2, ER-, MD-, HER2-) - Signed by Merced Holland M.D. on 2022  Stage prefix: Initial diagnosis  Method of lymph node assessment: [place] lymph node biopsy  Histologic grading system: 3 grade system      TREATMENT SUMMARY:  Radiation Treatments       Active   Plans   L Breast   Most recent treatment: Dose planned: 267 cGy (fraction 10 of 15 on 2023)   Total: Dose planned: 4,005 cGy   Elapsed Days: 14 @ [id number]      Reference Points   L Breast   Most recent treatment: Dose given: 267 cGy (on 2023)   Total: Dose given: 2,670 cGy   Elapsed Days: 14 @ [id number]      L Breast CP   Most recent treatment: Dose given: 267 cGy (on 2023)   Total: Dose given: 2,670 cGy   Elapsed Days: 14 @ [id number]                      SUBJECTIVE:     Feeling much better this week.      VITAL SIGNS:  There were no vitals taken for this visit.          2023     2:55 PM 1/3/2023    12:40 PM   Pain Assessment   Pain Score 2=MINIMAL PA 2=MINIMAL PA   Pain Loc BREAST BREAST          PHYSICAL EXAM:    No erythema        2023     2:56 PM   Toxicity Assessment   Toxicity Assessment Breast   Fatigue (lethargy, malaise, asthenia) Moderate (e.g., decrease in performance status by 1 ECOG level or 20% Karnofsky) or causing difficulty performing some activities   Fever (in the absence of neutropenia) None   Radiation Dermatitis None   Lymphatics Normal   RT - Pain due to RT None   Dyspnea Normal         IMPRESSION:  Cancer Staging   Breast cancer of upper-outer quadrant of left female  OFFERED PATIENT 240CC OF WATER AND 240CC OF APPLE JUICE AND ENCOURAGED THE PATIENT TO DRINK. PATIENT REFUSED. OFFERED PATIENT MEAL FARIDA NARANJO ENCOURAGED PATIENT TO EAT, PATIENT REFUSED. PATIENT IS LYING IN THE FLOOR BESIDE HER BED. ENCOURAGED PATIENT TO MOVE TO HER BED FOR COMFORT AND SAFETY, PATIENT REFUSED.    breast (HCC)  Staging form: Breast, AJCC 8th Edition  - Pathologic: Stage IB (pT1a, pN0(sn), cM0, G2, ER-, AL-, HER2-) - Signed by Merced Holland M.D. on 12/27/2022      PLAN:  No change in treatment plan    Disposition:  Treatment plan reviewed. Questions answered. Continue therapy outlined.     Merced Holland M.D.    No orders of the defined types were placed in this encounter.

## 2024-01-24 NOTE — PROGRESS NOTES
INPATIENT PSYCHIATRIC PROGRESS NOTE    Name:  Kingsley Grimm  :  1995  MRN:  [id number]  Visit Number:  [id number]  Length of stay:  25    Behavioral Health Treatment Plan and Problem List: I have reviewed and approved the Behavioral Health Treatment Plan and Problem list.    SUBJECTIVE    CC/ Focus of exam: depression/ anxiety/ psychosis.     Patient's subjective status: no comment.     INTERVAL HISTORY: The patient this morning as with most mornings has a flat affect prolonged latency of response and lack of spontaneity, however however yesterday patient showed progress with some but minimal interactions with peers, enjoyed a walk outside, and ate 75% of her lunch but no dinner.  So there is some minimal progress. Patient commented to her therapist at lunch that she was afraid to eat the meal because it would make her fat.      Discussed the case with the peer reviewer from the patient's insurance today, he aproved several more days of the hospitalization, suggested ECT which of course we endorsed but cannot proceed without the patient's consent.  Unable to rate patient's subjective depression and anxiety .  Patient has yet to take today's medication.       Sleep: Adequate         ROS: No cardiovascular, GI, or Neurological complaints.       OBJECTIVE    Vitals:    22 0730   BP: (!) 84/57   Pulse: 72   Resp:    Temp:    SpO2:       Temp:  [97.7 °F (36.5 °C)-98.6 °F (37 °C)] 98.6 °F (37 °C)  Heart Rate:  [62-82] 72  Resp:  [18] 18  BP: (82-98)/(46-67) 84/57    MENTAL STATUS EXAM:         Psychomotor: No psychomotor agitation/retardation, No EPS, No motor tics  Speech-normal rate, amount.  Mood/Affect:  Depressed  Thought Processes:  Probable thought blocking  Thought Content:  negativistic and mood congruent  Hallucination(s): none  Hopelessness: Unable to rate  Optimistic:Unable to rate  Suicidal Thoughts:   Unable to rate  Suicidal Plan/Intent:  No  Homicidal Thoughts:  absent  Orientation: Place  and Person  Memory: Unable to rate, does not seem to be confused    Lab Results (last 24 hours)     ** No results found for the last 24 hours. **           Imaging Results (Last 24 Hours)     ** No results found for the last 24 hours. **           ECG/EMG Results (most recent)     Procedure Component Value Units Date/Time    ECG 12 Lead [[id number]] Collected: 02/12/22 1008     Updated: 02/12/22 2235     QT Interval 396 ms      QTC Interval 436 ms     Narrative:      Test Reason : Baseline Cardiac Status  Blood Pressure :   */*   mmHG  Vent. Rate :  73 BPM     Atrial Rate :  73 BPM     P-R Int : 124 ms          QRS Dur :  82 ms      QT Int : 396 ms       P-R-T Axes :  21  79   9 degrees     QTc Int : 436 ms    Normal sinus rhythm  Normal ECG  No previous ECGs available  Confirmed by Didi Castro ([id number]) on 2/12/2022 10:35:03 PM    Referred By: PAVEL           Confirmed By: Didi Castro    ECG 12 Lead [[id number]] Collected: 02/25/22 1729     Updated: 02/26/22 0914     QT Interval 390 ms      QTC Interval 417 ms     Narrative:      Test Reason : monitoring  Blood Pressure :   */*   mmHG  Vent. Rate :  69 BPM     Atrial Rate :  69 BPM     P-R Int : 136 ms          QRS Dur :  84 ms      QT Int : 390 ms       P-R-T Axes :  51  80  31 degrees     QTc Int : 417 ms    Normal sinus rhythm  Normal ECG  When compared with ECG of 12-FEB-2022 10:08,  No significant change was found  Confirmed by Rene Clark ([id number]) on 2/26/2022 9:13:02 AM    Referred By:            Confirmed By: Rene Clark           ALLERGIES: Patient has no known allergies.    Medications:     FLUoxetine, 20 mg, Oral, Daily  LORazepam, 1 mg, Oral, TID  Remind me before we leave the fourth task about       ASSESSMENT & PLAN    Major depressive disorder, severe, recurrent, with psychotic features  -Admitted for crisis stabilization  - patient refusing the Abilify (d/c)  -Continue Prozac 20 mg p.o. daily; intermittently compliant  -  lorazepam 1 mg p.o or IM. (patient's choice) every 8 hour , will not be able to administer the lorazepam IM if patient refuses.  Medication volitionally as she has shown improvement following previous doses  Continuing to recommend ECT.   -    Anorexia nervosa  -Incorporate behavioral modifications and to treatment plan  -Dietary consult  -Abilify and Prozac as above  -Has required IVF since admission  -Elevated concern for malnutrition      Special precautions: Special Precautions Level 3 (q15 min checks)     Behavioral Health Treatment Plan and Problem List: I have reviewed and approved the Behavioral Health Treatment Plan and Problem list.    I spent a total of 26 minutes in direct patient care including  17 minutes face to face with the patient assessment, coordination of care, and counseling the patient on the current and follow-up treatment plans regarding her status and situation.  Patient had no additional questions.     MIREYA Beebe MD    Clinician:  Russ Beebe MD  03/04/22  10:18 EST    Dictated utilizing Dragon dictation      Quality 226: Preventive Care And Screening: Tobacco Use: Screening And Cessation Intervention: Patient screened for tobacco use and is an ex/non-smoker Detail Level: Detailed

## 2024-01-25 ENCOUNTER — LAB (OUTPATIENT)
Dept: OBSTETRICS AND GYNECOLOGY | Facility: CLINIC | Age: 29
End: 2024-01-25
Payer: MEDICAID

## 2024-01-25 ENCOUNTER — TELEPHONE (OUTPATIENT)
Dept: OBSTETRICS AND GYNECOLOGY | Facility: CLINIC | Age: 29
End: 2024-01-25
Payer: COMMERCIAL

## 2024-01-25 DIAGNOSIS — Z32.01 POSITIVE URINE PREGNANCY TEST: Primary | ICD-10-CM

## 2024-01-25 NOTE — TELEPHONE ENCOUNTER
Caller: Kingsley Grimm    Relationship to patient: Self    Best call back number: [phone] / LVM    Chief complaint: FAINT PREG TEST    Type of visit: NEW OB    Requested date: ASAP    If rescheduling, when is the original appointment: NO    Additional notes: PT HAS NOT BEEN SEEN @ [facility]  IN THE PAST - LMP 12/07/23 - FAINT PINK LINE ON PREG TEST - PT WANTS LABS TO CONFIRM PREGNANCY - HUB WT TO OFFICE AND SPOKE WITH CHERYLE AND WAS TOLD TO SEND A TE TO [facility] AND SOMEONE WILL CALL THE PT BACK TO [facility] FOR LABS     PLEASE CALL PT TO LET HER KNOW    THANK YOU!

## 2024-01-25 NOTE — TELEPHONE ENCOUNTER
Called patient she desires to establish ob care here undecided of ob. Last preg was sab with D&C [facility]. She is taking prenatal vitamins and baby aspirin. New ob appt with us scheduled. She denies pain or bleeding. HCG level ordered she will come today or tomorrow to get it drawn.

## 2024-01-26 LAB — HCG INTACT+B SERPL-ACNC: <1 MIU/ML

## 2024-04-03 ENCOUNTER — TELEPHONE (OUTPATIENT)
Dept: OBSTETRICS AND GYNECOLOGY | Facility: CLINIC | Age: 29
End: 2024-04-03
Payer: MEDICAID

## 2024-04-04 ENCOUNTER — INITIAL PRENATAL (OUTPATIENT)
Dept: OBSTETRICS AND GYNECOLOGY | Facility: CLINIC | Age: 29
End: 2024-04-04
Payer: MEDICAID

## 2024-04-04 VITALS — SYSTOLIC BLOOD PRESSURE: 100 MMHG | DIASTOLIC BLOOD PRESSURE: 68 MMHG | WEIGHT: 121.6 LBS | BODY MASS INDEX: 19.63 KG/M2

## 2024-04-04 DIAGNOSIS — Z36.9 ENCOUNTER FOR ANTENATAL SCREENING: Primary | ICD-10-CM

## 2024-04-04 DIAGNOSIS — Z34.01 ENCOUNTER FOR SUPERVISION OF NORMAL FIRST PREGNANCY IN FIRST TRIMESTER: ICD-10-CM

## 2024-04-04 RX ORDER — PRENATAL VIT/IRON FUM/FOLIC AC 27MG-0.8MG
TABLET ORAL DAILY
COMMUNITY

## 2024-04-04 RX ORDER — ASPIRIN 81 MG/1
81 TABLET ORAL DAILY
COMMUNITY

## 2024-04-04 NOTE — PROGRESS NOTES
Initial ob visit     CC- Here for care of pregnancy        Kingsley Grimm is a 28 y.o. female, , who presents for her first obstetrical visit.  Patient's last menstrual period was 2024.. Her EZ is Not found.. Current GA is Unknown.     Initial positive test date : 24, UPT        Her periods are very irregular  Prior obstetric issues: SAB x1 with D&C 10/09/23  Patient's past medical history is significant for:  hx of suicide attempt in  and SI in  .  Family history of genetic issues (includes FOB): none  Prior infections concerning in pregnancy (Rash, fever in last 2 weeks): No  Varicella Hx - vaccinated  Prior testing for Cystic Fibrosis Carrier or Sickle Cell Trait- none  Prepregnancy BMI - Body mass index is 19.63 kg/m².  History of STD: no  Hx of HSV for patient or partner: no  Ultrasound Today: yes, SIUP.  bpm.    OB History    Para Term  AB Living   2 0 0 0 1 0   SAB IAB Ectopic Molar Multiple Live Births   1 0 0 0 0 0      # Outcome Date GA Lbr Reji/2nd Weight Sex Type Anes PTL Lv   2 Current            1 SAB 10/09/23 9w0d    SAB          Additional Pertinent History   Last Pap : unknown      Last Completed Pap Smear       This patient has no relevant Health Maintenance data.          History of abnormal Pap smear: no  Family history of uterine, colon, breast, or ovarian cancer: no  Feelings of Anxiety or Depression: yes - Patient states that it is well managed.  Tobacco Usage?: No   Alcohol/Drug Use?: NO  Over the age of 35 at delivery: no  Genetic Screening: desires cell free DNA  Flu Status: Declines    PMH    Current Outpatient Medications:     aspirin 81 MG EC tablet, Take 1 tablet by mouth Daily., Disp: , Rfl:     Prenatal Vit-Fe Fumarate-FA (prenatal vitamin 27-0.8) 27-0.8 MG tablet tablet, Take  by mouth Daily., Disp: , Rfl:      Past Medical History:   Diagnosis Date    History of drug overdose 2021    intentional, with ibuprofen    History of  suicidal ideation 2022    with paranoia    Psychosis     SAB (spontaneous )         Past Surgical History:   Procedure Laterality Date    D & C WITH SUCTION  10/09/23    DILATATION AND EVACUATION  10/2023    MULTIPLE TOOTH EXTRACTIONS  2022    impacted teeth numbers 1, 17,and 32.; Surgical extraction soft tissue impaction # 16    WISDOM TOOTH EXTRACTION  Not sure       Review of Systems   Review of Systems    Patient Reports:  none  Patient Denies:excessive nausea , excessive vomiting, and vaginal bleeding  All systems reviewed and otherwise normal.    I have reviewed and agree with the HPI, ROS, and historical information as entered above. Rosmery Ramirez MD      /68   Wt 55.2 kg (121 lb 9.6 oz)   LMP 2024   BMI 19.63 kg/m²     The additional following portions of the patient's history were reviewed and updated as appropriate: allergies, current medications, past family history, past medical history, past social history, past surgical history, and problem list.    Physical Exam  General:  well developed; well nourished  no acute distress   Chest/Respiratory: No labored breathing, normal respiratory effort, normal appearance, no respiratory noises noted   Heart:  normal rate, regular rhythm,  no murmurs, rubs, or gallops   Thyroid: normal to inspection and palpation   Breasts:  Not performed.   Abdomen: soft, non-tender; no masses  no umbilical or inguinal hernias are present  no hepato-splenomegaly   Pelvis: Cervix:  normal appearance.  Uterus:  symmetrically enlarged, consisent with 7 weeks size;  Adnexa:  normal bimanual exam of the adnexa.        Assessment and Plan    Problem List Items Addressed This Visit    None  Visit Diagnoses       Encounter for  screening    -  Primary    Encounter for supervision of normal first pregnancy in first trimester                [facility] at [facility]  Reviewed routine prenatal care with the office and educational materials  given  Lab(s) Ordered  Discussed options for genetic testing including first trimester nuchal translucency screen, genetic disease carrier testing, quadruple screen, and NIPT  Patient is on Prenatal vitamins  Return in about 1 month (around 5/4/2024).      Rosmery Ramirez MD  04/04/2024

## 2024-04-05 LAB
ABO GROUP BLD: NORMAL
AMPHETAMINES UR QL SCN: NEGATIVE NG/ML
APPEARANCE UR: CLEAR
BACTERIA #/AREA URNS HPF: ABNORMAL /[HPF]
BARBITURATES UR QL SCN: NEGATIVE NG/ML
BASOPHILS # BLD AUTO: 0.1 X10E3/UL (ref 0–0.2)
BASOPHILS NFR BLD AUTO: 1 %
BENZODIAZ UR QL SCN: NEGATIVE NG/ML
BILIRUB UR QL STRIP: NEGATIVE
BLD GP AB SCN SERPL QL: NEGATIVE
BZE UR QL SCN: NEGATIVE NG/ML
CANNABINOIDS UR QL SCN: NEGATIVE NG/ML
CASTS URNS QL MICRO: ABNORMAL /LPF
COLOR UR: YELLOW
CREAT UR-MCNC: 239.9 MG/DL (ref 20–300)
CRYSTALS URNS MICRO: ABNORMAL
EOSINOPHIL # BLD AUTO: 0.1 X10E3/UL (ref 0–0.4)
EOSINOPHIL NFR BLD AUTO: 1 %
EPI CELLS #/AREA URNS HPF: >10 /HPF (ref 0–10)
ERYTHROCYTE [DISTWIDTH] IN BLOOD BY AUTOMATED COUNT: 12.3 % (ref 11.7–15.4)
GLUCOSE UR QL STRIP: NEGATIVE
HBV SURFACE AG SERPL QL IA: NEGATIVE
HCT VFR BLD AUTO: 35.7 % (ref 34–46.6)
HCV IGG SERPL QL IA: NON REACTIVE
HGB BLD-MCNC: 12.1 G/DL (ref 11.1–15.9)
HGB UR QL STRIP: NEGATIVE
HIV 1+2 AB+HIV1 P24 AG SERPL QL IA: NON REACTIVE
IMM GRANULOCYTES # BLD AUTO: 0 X10E3/UL (ref 0–0.1)
IMM GRANULOCYTES NFR BLD AUTO: 0 %
KETONES UR QL STRIP: ABNORMAL
LABORATORY COMMENT REPORT: NORMAL
LEUKOCYTE ESTERASE UR QL STRIP: NEGATIVE
LYMPHOCYTES # BLD AUTO: 2.5 X10E3/UL (ref 0.7–3.1)
LYMPHOCYTES NFR BLD AUTO: 31 %
MCH RBC QN AUTO: 29.9 PG (ref 26.6–33)
MCHC RBC AUTO-ENTMCNC: 33.9 G/DL (ref 31.5–35.7)
MCV RBC AUTO: 88 FL (ref 79–97)
METHADONE UR QL SCN: NEGATIVE NG/ML
MICRO URNS: ABNORMAL
MICRO URNS: ABNORMAL
MONOCYTES # BLD AUTO: 0.6 X10E3/UL (ref 0.1–0.9)
MONOCYTES NFR BLD AUTO: 7 %
NEUTROPHILS # BLD AUTO: 4.9 X10E3/UL (ref 1.4–7)
NEUTROPHILS NFR BLD AUTO: 60 %
NITRITE UR QL STRIP: NEGATIVE
OPIATES UR QL SCN: NEGATIVE NG/ML
OXYCODONE+OXYMORPHONE UR QL SCN: NEGATIVE NG/ML
PCP UR QL: NEGATIVE NG/ML
PH UR STRIP: 6.5 [PH] (ref 5–7.5)
PH UR: 6.2 [PH] (ref 4.5–8.9)
PLATELET # BLD AUTO: 151 X10E3/UL (ref 150–450)
PROPOXYPH UR QL SCN: NEGATIVE NG/ML
PROT UR QL STRIP: ABNORMAL
RBC # BLD AUTO: 4.05 X10E6/UL (ref 3.77–5.28)
RBC #/AREA URNS HPF: ABNORMAL /HPF (ref 0–2)
REF LAB TEST METHOD: NORMAL
RH BLD: POSITIVE
RPR SER QL: NON REACTIVE
RUBV IGG SERPL IA-ACNC: 1.11 INDEX
SP GR UR STRIP: 1.03 (ref 1–1.03)
UNIDENT CRYS URNS QL MICRO: PRESENT
UROBILINOGEN UR STRIP-MCNC: 0.2 MG/DL (ref 0.2–1)
WBC # BLD AUTO: 8.1 X10E3/UL (ref 3.4–10.8)
WBC #/AREA URNS HPF: ABNORMAL /HPF (ref 0–5)

## 2024-04-06 LAB
BACTERIA UR CULT: NORMAL
BACTERIA UR CULT: NORMAL

## 2024-05-03 ENCOUNTER — ROUTINE PRENATAL (OUTPATIENT)
Dept: OBSTETRICS AND GYNECOLOGY | Facility: CLINIC | Age: 29
End: 2024-05-03
Payer: MEDICAID

## 2024-05-03 VITALS — WEIGHT: 121 LBS | DIASTOLIC BLOOD PRESSURE: 60 MMHG | SYSTOLIC BLOOD PRESSURE: 100 MMHG | BODY MASS INDEX: 19.53 KG/M2

## 2024-05-03 DIAGNOSIS — Z34.01 PREGNANCY, FIRST, FIRST TRIMESTER: Primary | ICD-10-CM

## 2024-05-03 LAB
GLUCOSE UR STRIP-MCNC: NEGATIVE MG/DL
PROT UR STRIP-MCNC: NEGATIVE MG/DL

## 2024-05-03 NOTE — PROGRESS NOTES
OB FOLLOW UP  CC- Here for care of pregnancy        Kingsley Grimm is a 28 y.o.  11w4d patient being seen today for her obstetrical follow up visit. Patient reports daily cramping-states severe at times and brownish vaginal discharge. Patient is also c/o depression. States hx of major depressive disorder. She is c/o of feeling extreme anger and sadness at times. Admits to SI and HI but denies current plan. She has an established therapist but has not met without in over 1 month. States has taken several medications to manage symptoms in the past but none currently.     Her prenatal care is complicated by (and status) :   Patient Active Problem List   Diagnosis    Schizoaffective disorder, depressive type    Anorexia nervosa       Genetic testing?: declines.  NOB labs reviewed  Ultrasound Today: No    ROS -   Patient Denies: leaking of fluid, vaginal bleeding, and excessive vomiting  All other systems reviewed and are negative.     The additional following portions of the patient's history were reviewed and updated as appropriate: allergies and current medications.    I have reviewed and agree with the HPI, ROS, and historical information as entered above. Rosmery Ramirez MD          /60   Wt 54.9 kg (121 lb)   LMP 2024   BMI 19.53 kg/m²         EXAM:     Prenatal Vitals  BP: 100/60  Weight: 54.9 kg (121 lb)   Fetal Heart Rate: 165          Urine Glucose Read-only: Negative  Urine Protein Read-only: Negative       Assessment and Plan    Problem List Items Addressed This Visit    None  Visit Diagnoses       Pregnancy, first, first trimester    -  Primary    Relevant Orders    POC Urinalysis Dipstick (Completed)            Pregnancy at 11w4d  Labs reviewed from New OB Visit.  Counseled on genetic testing, carrier status and option for NT screen  Activity and Exercise discussed.  Patient is on Prenatal vitamins  Return in about 1 month (around 2024).    Rosmery Ramirez MD  2024

## 2024-05-07 NOTE — NURSING NOTE
"Patient refused medication this am. Patient became agitated and jumped out of bed ran in bathroom and slammed the door then came out of bathroom and threw her mattress off her bed. Patient also refused IM ativan. Patient stated \"Im done ! Im done !\"  " Render In Strict Bullet Format?: No Detail Level: Zone Plan: Will consider d/c doxycycline and starting her on spironolactone on next visit Continue Regimen: -clindamycin 1 % lotion QAM: Apply a thin layer to face every morning\\n\\n-adapalene 0.1 % topical cream:Apply a thin layer to face nightly, then apply moisturizer \\n-CeraVe gentle face wash and moisturizer Modify Regimen: -doxycycline hyclate 100 mg capsule :Take one pill twice daily with a full glass of water and meal. Do not lie down until 1 hour after taking. Separate from dairy and vitamins by 2 hours will be decreased to 100 mg QD

## 2024-05-08 ENCOUNTER — REFERRAL TRIAGE (OUTPATIENT)
Dept: LABOR AND DELIVERY | Facility: HOSPITAL | Age: 29
End: 2024-05-08
Payer: MEDICAID

## 2024-05-29 ENCOUNTER — PATIENT OUTREACH (OUTPATIENT)
Dept: LABOR AND DELIVERY | Facility: HOSPITAL | Age: 29
End: 2024-05-29
Payer: MEDICAID

## 2024-05-29 NOTE — OUTREACH NOTE
[employer]  Enrollment    Current Estimated Gestational Age: 15w2d    Questions/Answers      Flowsheet Row Responses   Would like to participate? Yes   Date of Intake Visit 06/03/24            [facility] survey sent.     Kalee Finley RN  Maternity Nurse Navigator    5/29/2024, 11:22 EDT

## 2024-06-03 ENCOUNTER — PATIENT OUTREACH (OUTPATIENT)
Dept: LABOR AND DELIVERY | Facility: HOSPITAL | Age: 29
End: 2024-06-03
Payer: MEDICAID

## 2024-06-03 NOTE — OUTREACH NOTE
[facility]  Intake    Current Estimated Gestational Age: 16w0d    Intake Assessment      Flowsheet Row Responses   Best Method for Contacting Cell   Currently Employed No   Able to keep appointments as scheduled Yes   Do you have a dentist? No   Resources Presently Utilizing: Mental Health Services  [Seeing Corinne Morris at [facility].]   Other Education HANDS, How to find a dentist, How to find a primary care provider, Insurance benefits/Incentives, Mental Health Services, SNAP Benefits, WIC Benefits            Learning Assessment      Flowsheet Row Responses   Relationship Patient   Learner Name Kingsley   Does the learner have any barriers to learning? No Barriers   What is the preferred language of the learner for medical teaching? English   Is an  required? No          [facility]  Check-In    Current Estimated Gestational Age: 16w0d      Questions/Answers      Flowsheet Row Responses   Currently Employed No   Able to keep appointments as scheduled Yes   Baby Active/Feeling Fetal Movemen No, Early in Pregnancy   Are you having any of the following symptoms? Breast Tenderness, Nausea/Vomiting   Questions regarding prenatal visits or tests to be ordered? No   Education related to new diagnoses/home equipment No   Resource/Environmental Concerns None   Do you have any questions related to your care experience, your pregnancy, plans for delivery, any concerns, etc? No   Other Education HANDS, How to find a dentist, How to find a primary care provider, Insurance benefits/Incentives, Mental Health Services, SNAP Benefits, WIC Benefits            Kingsley is seeing Dr. Ramirez for her prenatal care. States she is generally healthy and her history is significant for suicide attempt in 12/2021 by overdose.     She has experienced trauma in her life in the form of death of her Step-Father some years ago. She reports having thoughts of suicide several days each week and feeling lost or hopeless  "most days.  She states she has no plans for ending her life at this time. She has been seeing a virtual therapist irregularly at [facility], Corinne Morris. Offered referral to [facility], pt declined at this time. Encouraged to consider seeing a mental health provider of her choice as soon as she is able. She indicated \"Corinne and I will be talking soon I think.\"  Discussed that we will be screening periodically for  and postpartum changes with mood looking for trends which may need to be addressed. VU.    Spoke with Dr Ramirez via telep\marjan to apprise him of Kingsley' EPDS (18), PHQ2 (14) scores, c/o's depression and hopelessness and SI w/o active plan.  She has an appointment with him tomorrow and he will assess her mood at that time.     She is nervous about labor, birth and caring for a . Prenatal and breastfeeding education discussed. Discussed bonus benefits of pregnancy from insurance for potential assistance with some infant care items.     SDOH reviewed with patient. Issues with mental health were identified. Pt reports having support from partner and her mother.      Multiple resources provided via [employer] message. Contact information provided. Encouraged to call with questions, concerns, or for support. Will plan to see Kingsley in the office tomorrow and then f/u around 18 weeks for wellness and resource needs check in.    Kalee Finley RN  Maternity Nurse Navigator    6/3/2024, 13:32 EDT            Tobacco, Alcohol, and Drug History     reports that she has never smoked. She has never used smokeless tobacco.   reports that she does not currently use alcohol.   reports no history of drug use.    Kalee Finley RN  Maternity Nurse Navigator    6/3/2024, 13:32 EDT          "

## 2024-06-04 ENCOUNTER — ROUTINE PRENATAL (OUTPATIENT)
Dept: OBSTETRICS AND GYNECOLOGY | Facility: CLINIC | Age: 29
End: 2024-06-04
Payer: MEDICAID

## 2024-06-04 ENCOUNTER — PATIENT OUTREACH (OUTPATIENT)
Dept: LABOR AND DELIVERY | Facility: HOSPITAL | Age: 29
End: 2024-06-04
Payer: MEDICAID

## 2024-06-04 VITALS — DIASTOLIC BLOOD PRESSURE: 60 MMHG | SYSTOLIC BLOOD PRESSURE: 108 MMHG | BODY MASS INDEX: 20.18 KG/M2 | WEIGHT: 125 LBS

## 2024-06-04 DIAGNOSIS — Z34.92 PRENATAL CARE IN SECOND TRIMESTER: Primary | ICD-10-CM

## 2024-06-04 LAB
GLUCOSE UR STRIP-MCNC: NEGATIVE MG/DL
PROT UR STRIP-MCNC: NEGATIVE MG/DL

## 2024-06-04 PROCEDURE — 99213 OFFICE O/P EST LOW 20 MIN: CPT | Performed by: OBSTETRICS & GYNECOLOGY

## 2024-06-04 NOTE — OUTREACH NOTE
Feeling well physically and denies any concerning symptoms.  States her mood is much improved today and that since she became pregnant she has been having bouts of depression lasting a few to several days and than feels hopeful and excited for a few days before cycle begins again. Encouraged to seek counseling from her regular mental health provider or to contact [facility] or OB provider for referral to [facility]. VU.     Spoke with Dr Ramirez prior to his visit with patient today to update him on her status.     Contact information provided. Encouraged to call with questions, concerns, or for support. Will plan f/u around 20 weeks to ensure she has everything she needs in place.

## 2024-07-02 ENCOUNTER — ROUTINE PRENATAL (OUTPATIENT)
Dept: OBSTETRICS AND GYNECOLOGY | Facility: CLINIC | Age: 29
End: 2024-07-02
Payer: MEDICAID

## 2024-07-02 VITALS — WEIGHT: 128 LBS | BODY MASS INDEX: 20.66 KG/M2 | DIASTOLIC BLOOD PRESSURE: 62 MMHG | SYSTOLIC BLOOD PRESSURE: 98 MMHG

## 2024-07-02 DIAGNOSIS — Z34.92 PRENATAL CARE IN SECOND TRIMESTER: Primary | ICD-10-CM

## 2024-07-02 LAB
GLUCOSE UR STRIP-MCNC: NEGATIVE MG/DL
PROT UR STRIP-MCNC: NEGATIVE MG/DL

## 2024-07-02 PROCEDURE — 99213 OFFICE O/P EST LOW 20 MIN: CPT | Performed by: OBSTETRICS & GYNECOLOGY

## 2024-07-02 NOTE — PROGRESS NOTES
OB FOLLOW UP  CC- Here for care of pregnancy        Kingsley Grimm is a 28 y.o.  20w1d patient being seen today for her obstetrical follow up visit. Patient reports no complaints.     Her prenatal care is complicated by (and status) : see below.  Patient Active Problem List   Diagnosis    Schizoaffective disorder, depressive type    Anorexia nervosa       Ultrasound Today: Yes  AFP was declined.    ROS -     Patient Denies: leaking of fluid, vaginal bleeding, dysuria, excessive vomiting, and more than 6 contractions per hour  Fetal Movement : Yes  All other systems reviewed and are negative.       The additional following portions of the patient's history were reviewed and updated as appropriate: allergies, current medications, and problem list.      I have reviewed and agree with the HPI, ROS, and historical information as entered above. Rosmery Ramirez MD      BP 98/62   Wt 58.1 kg (128 lb)   LMP 2024   BMI 20.66 kg/m²       EXAM:     Prenatal Vitals  BP: 98/62  Weight: 58.1 kg (128 lb)   Fetal Heart Rate: 160 (US)          Urine Glucose Read-only: Negative  Urine Protein Read-only: Negative       Assessment and Plan    Problem List Items Addressed This Visit    None  Visit Diagnoses       Prenatal care in second trimester    -  Primary    Relevant Orders    POC Urinalysis Dipstick (Completed)    US Ob Follow Up Transabdominal Approach            Pregnancy at 20w1d  Anatomy scan today is incomplete, follow up in 4 weeks for additional views. Anatomy that was visualized was within normal limits.  Fetal status reassuring.   Activity and Exercise discussed.  Patient is on Prenatal vitamins  Return in about 1 month (around 2024) for us then .      Rosmery Ramirez MD  2024

## 2024-07-03 ENCOUNTER — PATIENT OUTREACH (OUTPATIENT)
Dept: LABOR AND DELIVERY | Facility: HOSPITAL | Age: 29
End: 2024-07-03
Payer: MEDICAID

## 2024-07-03 NOTE — OUTREACH NOTE
[facility]  Check-In    Current Estimated Gestational Age: 20w2d      Questions/Answers      Flowsheet Row Responses   Best Method for Contacting Cell   Demographics Reviewed No   Currently Employed No   Able to keep appointments as scheduled Yes   Baby Active/Feeling Fetal Movemen Yes  [Sometimes.]   How are you presently feeling? Feeling good   Are you having any of the following symptoms? --  [Denies]   Questions regarding prenatal visits or tests to be ordered? No  [All our questions were answered at our last appointment.]   Resource/Environmental Concerns None   Do you have any questions related to your care experience, your pregnancy, plans for delivery, any concerns, etc? No            Feeling well and reports some fetal movement. Explained FM at this gestation can be sparse and irregular. VU. Denies any concerning symptoms but discussed what to watch out for. Discussed that she still has time for childbirth education if she is interested.     Contact information provided. Encouraged to call with questions, concerns, or for support. Will plan f/u around 28 weeks to ensure she has everything she needs in place.     Kalee Finley RN  Maternity Nurse Navigator    7/3/2024, 10:50 EDT

## 2024-07-30 ENCOUNTER — ROUTINE PRENATAL (OUTPATIENT)
Dept: OBSTETRICS AND GYNECOLOGY | Facility: CLINIC | Age: 29
End: 2024-07-30
Payer: MEDICAID

## 2024-07-30 VITALS — DIASTOLIC BLOOD PRESSURE: 70 MMHG | WEIGHT: 132 LBS | BODY MASS INDEX: 21.31 KG/M2 | SYSTOLIC BLOOD PRESSURE: 110 MMHG

## 2024-07-30 DIAGNOSIS — Z34.02 FIRST PREGNANCY, SECOND TRIMESTER: Primary | ICD-10-CM

## 2024-07-30 LAB
GLUCOSE UR STRIP-MCNC: NEGATIVE MG/DL
PROT UR STRIP-MCNC: NEGATIVE MG/DL

## 2024-07-30 NOTE — PROGRESS NOTES
OB FOLLOW UP  CC- Here for care of pregnancy        Kingsley Grimm is a 28 y.o.  24w1d patient being seen today for her obstetrical follow up visit. Patient reports increased anxiety/depression. States hx of pre pregnancy. She is not currently taking medication to manage. States seeing counselor through telehealth 2x/week but does not feel like her symptoms are improving. Patient admits to SI. States has thought of ways to attempt suicide but has not created a plan to act on those thoughts.      Her prenatal care is complicated by (and status) : see below.  Patient Active Problem List   Diagnosis    Schizoaffective disorder, depressive type    Anorexia nervosa       Ultrasound Today: Yes  Reviewed 1 hr glucose testing and TDAP next visit.    ROS -   Patient Denies: leaking of fluid, vaginal bleeding, and more than 6 contractions per hour  Fetal Movement : normal  All other systems reviewed and are negative.       The additional following portions of the patient's history were reviewed and updated as appropriate: allergies and current medications.      I have reviewed and agree with the HPI, ROS, and historical information as entered above. Rosmery Ramirez MD      /70   Wt 59.9 kg (132 lb)   LMP 2024   BMI 21.31 kg/m²       EXAM:     Prenatal Vitals  BP: 110/70  Weight: 59.9 kg (132 lb)   Fetal Heart Rate: 153               Urine Glucose Read-only: Negative  Urine Protein Read-only: Negative       Assessment and Plan    Problem List Items Addressed This Visit    None  Visit Diagnoses       First pregnancy, second trimester    -  Primary    Relevant Medications    sertraline (Zoloft) 50 MG tablet    Other Relevant Orders    POC Urinalysis Dipstick (Completed)    Antibody Screen    CBC (No Diff)    Gestational Screen 1 Hr ([facility])    RPR Qualitative with Reflex to Quant            Pregnancy at 24w1d  Fetal status reassuring.  anatomy scan completed today and within normal limits.  1 hour gtt,  CBC, Antibody screen, TDAP, and RPR next visit. Instructions given  Lab(s) Ordered  Discussed/encouraged TDAP vaccination after 28 weeks  Activity and Exercise discussed.  Return in about 1 month (around 8/30/2024) for BS then .      Rosmery Ramirez MD  07/30/2024

## 2024-08-29 ENCOUNTER — ROUTINE PRENATAL (OUTPATIENT)
Dept: OBSTETRICS AND GYNECOLOGY | Facility: CLINIC | Age: 29
End: 2024-08-29
Payer: MEDICAID

## 2024-08-29 VITALS — WEIGHT: 137 LBS | DIASTOLIC BLOOD PRESSURE: 60 MMHG | BODY MASS INDEX: 22.11 KG/M2 | SYSTOLIC BLOOD PRESSURE: 100 MMHG

## 2024-08-29 DIAGNOSIS — Z34.83 PRENATAL CARE, SUBSEQUENT PREGNANCY, THIRD TRIMESTER: Primary | ICD-10-CM

## 2024-08-29 DIAGNOSIS — Z34.02 FIRST PREGNANCY, SECOND TRIMESTER: ICD-10-CM

## 2024-08-29 LAB
ERYTHROCYTE [DISTWIDTH] IN BLOOD BY AUTOMATED COUNT: 12.4 % (ref 12.3–15.4)
GLUCOSE 1H P 50 G GLC PO SERPL-MCNC: 108 MG/DL (ref 65–139)
GLUCOSE UR STRIP-MCNC: NEGATIVE MG/DL
HCT VFR BLD AUTO: 29.6 % (ref 34–46.6)
HGB BLD-MCNC: 10.2 G/DL (ref 12–15.9)
MCH RBC QN AUTO: 31.1 PG (ref 26.6–33)
MCHC RBC AUTO-ENTMCNC: 34.5 G/DL (ref 31.5–35.7)
MCV RBC AUTO: 90.2 FL (ref 79–97)
PLATELET # BLD AUTO: 146 10*3/MM3 (ref 140–450)
PROT UR STRIP-MCNC: NEGATIVE MG/DL
RBC # BLD AUTO: 3.28 10*6/MM3 (ref 3.77–5.28)
WBC # BLD AUTO: 8.02 10*3/MM3 (ref 3.4–10.8)

## 2024-08-29 NOTE — PROGRESS NOTES
OB FOLLOW UP  CC- Here for care of pregnancy        Kingsley Grimm is a 28 y.o.  28w3d patient being seen today for her obstetrical follow up. Patient reports occasional swelling in feet, mild cramping/BH contractions. Patient started zoloft 50mg at last OV and is feeling better. She is requesting RF today.     Patient undergoing Glucola testing today. She is due for her testing at 1120.       MBT: O+  Rhogam: is not indicated.  28 week packet: reviewed with patient , counseled on fetal movement , pediatrician list reviewed, breast pump discussed, and childbirth classes reviewed  TDAP: given today  Ultrasound Today: No    Her prenatal care is complicated by (and status) : see below.  Patient Active Problem List   Diagnosis    Schizoaffective disorder, depressive type    Anorexia nervosa         ROS -   Patient Denies: Loss of Fluid and Vaginal Spotting  Fetal Movement : normal    The additional following portions of the patient's history were reviewed and updated as appropriate: allergies and current medications.    I have reviewed and agree with the HPI, ROS, and historical information as entered above. Rosmery Ramirez MD      /60   Wt 62.1 kg (137 lb)   LMP 2024   BMI 22.11 kg/m²         EXAM:     Prenatal Vitals  BP: 100/60  Weight: 62.1 kg (137 lb)   Fetal Heart Rate: 140               Urine Glucose Read-only: Negative  Urine Protein Read-only: Negative         Assessment and Plan    Problem List Items Addressed This Visit    None  Visit Diagnoses       Prenatal care, subsequent pregnancy, third trimester    -  Primary    Relevant Medications    sertraline (Zoloft) 50 MG tablet    Other Relevant Orders    POC Urinalysis Dipstick (Completed)    CBC (No Diff)    Gestational Screen 1 Hr ([facility])    Antibody Screen    RPR, Rfx Qn RPR / Confirm TP    Tdap Vaccine Greater Than or Equal To 8yo IM (Completed)    First pregnancy, second trimester        Relevant Medications    sertraline  (Zoloft) 50 MG tablet          Doiong well on Zoloft and is refilled  Pregnancy at 28w3d  1 hr Glucola, CBC, RPR. Antibody screen and TDAP today  Fetal movement/PTL or Labor precautions  Medication(s) Ordered  Patient is on Prenatal vitamins  Activity and Exercise discussed.  Return in about 1 month (around 9/29/2024) for Recheck.        Rosmery Ramirez MD  08/29/2024

## 2024-08-30 ENCOUNTER — PATIENT OUTREACH (OUTPATIENT)
Dept: LABOR AND DELIVERY | Facility: HOSPITAL | Age: 29
End: 2024-08-30
Payer: MEDICAID

## 2024-08-30 LAB
BLD GP AB SCN SERPL QL: NEGATIVE
RPR SER QL: NON REACTIVE

## 2024-08-30 NOTE — OUTREACH NOTE
[facility]    Called Kingsley, she is in the process of moving and requested return call next week.  Call rescheduled for 9/3.     Kalee Finley RN  Maternity Nurse Navigator    8/30/2024, 13:51 EDT

## 2024-09-03 ENCOUNTER — PATIENT OUTREACH (OUTPATIENT)
Dept: LABOR AND DELIVERY | Facility: HOSPITAL | Age: 29
End: 2024-09-03
Payer: MEDICAID

## 2024-09-03 NOTE — OUTREACH NOTE
"[facility]  Check-In    Current Estimated Gestational Age: 29w1d      Questions/Answers      Flowsheet Row Responses   Best Method for Contacting Cell   Demographics Reviewed No   Able to keep appointments as scheduled Yes   Gender(s) and Name(s) Girl   Baby Active/Feeling Fetal Movemen Yes   How are you presently feeling? \"having some emotional issues but I'm medicated for that\"   Are you having any of the following symptoms? Nausea/Vomiting, Edema  [Nausea at night and edema in BLE with leg cramps.]   Questions regarding prenatal visits or tests to be ordered? No   Education related to new diagnoses/home equipment No   Resource/Environmental Concerns None   Do you have any questions related to your care experience, your pregnancy, plans for delivery, any concerns, etc? Yes   Question Discussed insurance benefits and coverage for Mom and .   Other Education Birth Plan, HANDS, How to find a dentist, Insurance benefits/Incentives, SNAP Benefits, WIC Benefits          Feeling well and reports good fetal movement. Denies any concerning symptoms but discussed what to watch out for. Discussed that she still has time for childbirth education if she is interested. Discussed breastfeeding video and outpatient lactation clinic support as well as WIC support.     She has not begun gathering items she will need for baby. She and FOZI have recently moved residences and haven't taken the time to gather baby items.  FOB has full legal custody of his older three children, ages 16,10 and 8.     Updated resources provided via [employer] message. Contact information provided. Encouraged to call with questions, concerns, or for support. Will plan f/u around 35 weeks to ensure she has everything she needs in place and feels ready for delivery.    Kalee Finley RN  Maternity Nurse Navigator    9/3/2024, 14:02 EDT          "

## 2024-09-09 ENCOUNTER — PATIENT OUTREACH (OUTPATIENT)
Dept: LABOR AND DELIVERY | Facility: HOSPITAL | Age: 29
End: 2024-09-09
Payer: MEDICAID

## 2024-09-09 NOTE — OUTREACH NOTE
[facility]  Unable to Reach       Questions/Answers      Flowsheet Row Responses   Pending Outreach Prenatal Check-in   Call Attempt First   Outcome No answer/busy, Left message   Next Call Attempt Date 09/10/24          Called Kingsley per her request via [employer]. Will attempt call again tomorrow.     Kalee Finley RN  Maternity Nurse Navigator    9/9/2024, 15:59 EDT

## 2024-09-10 ENCOUNTER — PATIENT OUTREACH (OUTPATIENT)
Dept: LABOR AND DELIVERY | Facility: HOSPITAL | Age: 29
End: 2024-09-10
Payer: MEDICAID

## 2024-09-10 NOTE — OUTREACH NOTE
"[facility]  Unable to Reach       Questions/Answers      Flowsheet Row Responses   Pending Outreach Prenatal Check-in   Call Attempt Second   Outcome No answer/busy   Next Call Attempt Date 09/10/24   Unable to reach comments: \"The wireless customer you are trying to reach is not available\". Will attempt contact later today.              Kalee Finley RN  Maternity Nurse Navigator    9/10/2024, 09:29 EDT    "

## 2024-09-10 NOTE — OUTREACH NOTE
"[facility]    Phoned Kingsley per her MyChart request. She wanted to discuss her recent session with her therapist and the possible mis-diagnosis in childhood of ASD vs ADD vs possible Autism spectrum disorder. She plans evaluation at [facility] as soon as her therapist secures a referral. She feels her mental health is on a better path now and expressed relief at \"having the tools\" to deal with issues currently.     I encouraged her to reach out to [facility] with questions or concerns as the pregnancy progresses. JOSE Finley RN  Maternity Nurse Navigator    9/10/2024, 15:13 EDT            "

## 2024-09-16 ENCOUNTER — OFFICE VISIT (OUTPATIENT)
Dept: FAMILY MEDICINE CLINIC | Facility: CLINIC | Age: 29
End: 2024-09-16
Payer: MEDICAID

## 2024-09-16 VITALS
OXYGEN SATURATION: 98 % | DIASTOLIC BLOOD PRESSURE: 75 MMHG | WEIGHT: 139.6 LBS | BODY MASS INDEX: 22.43 KG/M2 | HEIGHT: 66 IN | SYSTOLIC BLOOD PRESSURE: 110 MMHG | HEART RATE: 84 BPM

## 2024-09-16 DIAGNOSIS — F32.2 CURRENT SEVERE EPISODE OF MAJOR DEPRESSIVE DISORDER WITHOUT PSYCHOTIC FEATURES WITHOUT PRIOR EPISODE: Primary | ICD-10-CM

## 2024-09-16 PROBLEM — F32.9 MAJOR DEPRESSIVE DISORDER, SINGLE EPISODE, UNSPECIFIED: Status: ACTIVE | Noted: 2024-09-16

## 2024-09-16 PROCEDURE — 1159F MED LIST DOCD IN RCRD: CPT | Performed by: FAMILY MEDICINE

## 2024-09-16 PROCEDURE — 1160F RVW MEDS BY RX/DR IN RCRD: CPT | Performed by: FAMILY MEDICINE

## 2024-09-16 PROCEDURE — 99204 OFFICE O/P NEW MOD 45 MIN: CPT | Performed by: FAMILY MEDICINE

## 2024-09-16 RX ORDER — SERTRALINE HYDROCHLORIDE 25 MG/1
25 TABLET, FILM COATED ORAL DAILY
Qty: 30 TABLET | Refills: 5 | Status: SHIPPED | OUTPATIENT
Start: 2024-09-16

## 2024-09-19 ENCOUNTER — PATIENT ROUNDING (BHMG ONLY) (OUTPATIENT)
Dept: FAMILY MEDICINE CLINIC | Facility: CLINIC | Age: 29
End: 2024-09-19
Payer: MEDICAID

## 2024-09-20 ENCOUNTER — TELEPHONE (OUTPATIENT)
Dept: OBSTETRICS AND GYNECOLOGY | Facility: CLINIC | Age: 29
End: 2024-09-20
Payer: MEDICAID

## 2024-09-20 ENCOUNTER — ROUTINE PRENATAL (OUTPATIENT)
Dept: OBSTETRICS AND GYNECOLOGY | Facility: CLINIC | Age: 29
End: 2024-09-20
Payer: MEDICAID

## 2024-09-20 VITALS — WEIGHT: 136.8 LBS | SYSTOLIC BLOOD PRESSURE: 112 MMHG | BODY MASS INDEX: 22.09 KG/M2 | DIASTOLIC BLOOD PRESSURE: 64 MMHG

## 2024-09-20 DIAGNOSIS — K59.09 OTHER CONSTIPATION: ICD-10-CM

## 2024-09-20 DIAGNOSIS — F50.00 ANOREXIA NERVOSA: ICD-10-CM

## 2024-09-20 DIAGNOSIS — Z34.93 PRENATAL CARE IN THIRD TRIMESTER: Primary | ICD-10-CM

## 2024-09-20 DIAGNOSIS — R30.0 DYSURIA: ICD-10-CM

## 2024-09-20 DIAGNOSIS — F32.9 MAJOR DEPRESSIVE DISORDER WITH SINGLE EPISODE, REMISSION STATUS UNSPECIFIED: ICD-10-CM

## 2024-09-20 DIAGNOSIS — F25.1 SCHIZOAFFECTIVE DISORDER, DEPRESSIVE TYPE: ICD-10-CM

## 2024-09-20 LAB
APPEARANCE UR: CLEAR
BILIRUB BLD-MCNC: NEGATIVE MG/DL
BLOOD, POC: NEGATIVE
COLOR UR: YELLOW
GLUCOSE UR STRIP-MCNC: NEGATIVE MG/DL
KETONES UR QL: NEGATIVE
LEUKOCYTE EST, POC: NEGATIVE
NITRITE UR-MCNC: NEGATIVE MG/ML
PH UR: 6 [PH] (ref 4–9)
PROT UR STRIP-MCNC: NEGATIVE MG/DL
SP GR UR: 1.01 (ref 1–1.03)
UROBILINOGEN UR QL: NORMAL

## 2024-09-20 RX ORDER — UREA 10 %
45 LOTION (ML) TOPICAL DAILY
COMMUNITY

## 2024-09-22 LAB
BACTERIA UR CULT: NO GROWTH
BACTERIA UR CULT: NORMAL

## 2024-09-27 ENCOUNTER — ROUTINE PRENATAL (OUTPATIENT)
Dept: OBSTETRICS AND GYNECOLOGY | Facility: CLINIC | Age: 29
End: 2024-09-27
Payer: MEDICAID

## 2024-09-27 VITALS — WEIGHT: 140 LBS | BODY MASS INDEX: 22.61 KG/M2 | DIASTOLIC BLOOD PRESSURE: 62 MMHG | SYSTOLIC BLOOD PRESSURE: 108 MMHG

## 2024-09-27 DIAGNOSIS — Z34.93 PRENATAL CARE IN THIRD TRIMESTER: Primary | ICD-10-CM

## 2024-09-27 LAB
GLUCOSE UR STRIP-MCNC: NEGATIVE MG/DL
PROT UR STRIP-MCNC: NEGATIVE MG/DL

## 2024-10-11 ENCOUNTER — ROUTINE PRENATAL (OUTPATIENT)
Dept: OBSTETRICS AND GYNECOLOGY | Facility: CLINIC | Age: 29
End: 2024-10-11
Payer: MEDICAID

## 2024-10-11 ENCOUNTER — PATIENT OUTREACH (OUTPATIENT)
Dept: LABOR AND DELIVERY | Facility: HOSPITAL | Age: 29
End: 2024-10-11
Payer: MEDICAID

## 2024-10-11 VITALS — BODY MASS INDEX: 22.77 KG/M2 | SYSTOLIC BLOOD PRESSURE: 100 MMHG | WEIGHT: 141 LBS | DIASTOLIC BLOOD PRESSURE: 62 MMHG

## 2024-10-11 DIAGNOSIS — F32.A DEPRESSION, UNSPECIFIED DEPRESSION TYPE: ICD-10-CM

## 2024-10-11 DIAGNOSIS — Z34.83 PRENATAL CARE, SUBSEQUENT PREGNANCY, THIRD TRIMESTER: Primary | ICD-10-CM

## 2024-10-11 LAB
GLUCOSE UR STRIP-MCNC: NEGATIVE MG/DL
PROT UR STRIP-MCNC: ABNORMAL MG/DL

## 2024-10-11 NOTE — PROGRESS NOTES
OB FOLLOW UP  CC- Here for care of pregnancy        Kingsley Grimm is a 28 y.o.  34w4d patient being seen today for her obstetrical follow up visit. Patient reports mild swelling feet.     Her prenatal care is complicated by (and status) : see below.  Patient Active Problem List   Diagnosis    Schizoaffective disorder, depressive type    Anorexia nervosa    Major depressive disorder, single episode, unspecified       RSV: already given  Ultrasound Today: No  Non Stress Test: No.    ROS -   Patient Denies: Loss of Fluid and Vaginal Spotting  Fetal Movement : normal  All other systems reviewed and are negative.       The additional following portions of the patient's history were reviewed and updated as appropriate: allergies and current medications.    I have reviewed and agree with the HPI, ROS, and historical information as entered above. Rosmery Ramirez MD      /62   Wt 64 kg (141 lb)   LMP 2024   BMI 22.77 kg/m²       EXAM:     Prenatal Vitals  BP: 100/62  Weight: 64 kg (141 lb)   Fetal Heart Rate: 40               Urine Glucose Read-only: Negative  Urine Protein Read-only: (!) Trace           Assessment and Plan    Problem List Items Addressed This Visit    None  Visit Diagnoses       Prenatal care, subsequent pregnancy, third trimester    -  Primary    Relevant Orders    POC Urinalysis Dipstick (Completed)            Pregnancy at 34w4d  Fetal status reassuring.   Activity and Exercise discussed.  Fetal movement/PTL or Labor precautions  Patient is on Prenatal vitamins  Anxiety- discussed options for treatment  GBS next visit  Patient currently taking Zoloft 75 mg a day.  She is off and on depressed and is being counseled by her family physician and a counselor she has already.  I asked her if she seems depressed she goes off and on all the time.  She says she is getting treated for this and is doing well with her treatment.  She is to call us if she has any significant changes in this  situation.  Return in about 2 weeks (around 10/25/2024).    Rosmery Ramirez MD  10/11/2024

## 2024-10-11 NOTE — OUTREACH NOTE
[facility]  Check-In    Current Estimated Gestational Age: 34w4d      Questions/Answers      Flowsheet Row Responses   Best Method for Contacting Cell   Demographics Reviewed No   Currently Employed No   Able to keep appointments as scheduled Yes   Gender(s) and Name(s) Girl   Baby Active/Feeling Fetal Movemen Yes   How are you presently feeling? good   Are you having any of the following symptoms? --  [Denies]   Questions regarding prenatal visits or tests to be ordered? No   Education related to new diagnoses/home equipment No   May I ask you questions about your substance use? Yes   Supplies ready for baby Car Seat, Clothing, Crib, Diapers   Resource/Environmental Concerns None   Do you have any questions related to your care experience, your pregnancy, plans for delivery, any concerns, etc? No   Other Education HANDS, How to find a dentist, How to find a pediatrician, Insurance benefits/Incentives, SNAP Benefits, WIC Benefits        Feeling well and reports good fetal movement. Discussed what to expect when she goes in to L&D including admission process, induction process, comfort measures, positioning, pain medication/Fentanyl, epidural placement, delivery, skin to skin, breastfeeding, post-delivery, and MB care. VU.    PN EPDS screening was scored 19 today. Dr Ramirez notified by [facility] prior to his visit with Kingsley. She reports being in counseling and feels she is well taken care of.     She is feeling ready for delivery. She was encouraged to enroll in WIC and feels like she has everything she will need for baby.    Updated resources provided via [employer] message. Contact information provided. Encouraged to call with questions, concerns, or for support. Will plan to see inpatient after delivery.    Kalee Finley RN  Maternity Nurse Navigator    10/11/2024, 14:34 EDT

## 2024-10-14 ENCOUNTER — OFFICE VISIT (OUTPATIENT)
Dept: FAMILY MEDICINE CLINIC | Facility: CLINIC | Age: 29
End: 2024-10-14
Payer: MEDICAID

## 2024-10-14 VITALS
WEIGHT: 139.2 LBS | OXYGEN SATURATION: 95 % | BODY MASS INDEX: 22.37 KG/M2 | SYSTOLIC BLOOD PRESSURE: 105 MMHG | HEART RATE: 85 BPM | DIASTOLIC BLOOD PRESSURE: 68 MMHG | HEIGHT: 66 IN

## 2024-10-14 DIAGNOSIS — F32.1 CURRENT MODERATE EPISODE OF MAJOR DEPRESSIVE DISORDER WITHOUT PRIOR EPISODE: Primary | ICD-10-CM

## 2024-10-14 PROCEDURE — 1159F MED LIST DOCD IN RCRD: CPT | Performed by: FAMILY MEDICINE

## 2024-10-14 PROCEDURE — 1160F RVW MEDS BY RX/DR IN RCRD: CPT | Performed by: FAMILY MEDICINE

## 2024-10-14 PROCEDURE — 99213 OFFICE O/P EST LOW 20 MIN: CPT | Performed by: FAMILY MEDICINE

## 2024-10-14 NOTE — ASSESSMENT & PLAN NOTE
Patient's depression is a recurrent episode that is moderate without psychosis. Depression is active and improving with treatment.    Plan:   Continue current medication therapy     Followup in 3 months.

## 2024-10-14 NOTE — PROGRESS NOTES
Office Note     Name: Kingsley Grimm    : 1995     MRN: [id number]     Chief Complaint  Med Management (4 week follow up)    Subjective     History of Present Illness:  Kingsley Grimm is a 28 y.o. female who presents today for FU.  She is originally from [place].  She current lives at home with her fiancé, 3 daughters, and she has a fourth daughter on the way.  They have 2 pet dogs, a kitten, and a bunny.  She works as a homemaker.     Patient has a history of depression.  Last visit patient was started on Zoloft 75 mg daily.  She notes she has done very well with this medication change and sees an improvement in her symptoms.  She has no acute complaints today.  She is anticipating her delivery next month.     Patient is G2, P0.   She notes that she follows with Dr. Ramirez, and was found to have iron deficiency and is currently taking a supplement.  She has a family history of prostate cancer.    Review of Systems:   Review of Systems   Psychiatric/Behavioral:  Positive for depressed mood.    All other systems reviewed and are negative.      Past Medical History:   Past Medical History:   Diagnosis Date    Anxiety     Depression     Eating disorder     History of drug overdose 2021    intentional, with ibuprofen    History of suicidal ideation 2022    with paranoia    Psychosis     SAB (spontaneous )        Past Surgical History:   Past Surgical History:   Procedure Laterality Date    D & C WITH SUCTION  10/09/23    DILATATION AND EVACUATION  10/2023    MULTIPLE TOOTH EXTRACTIONS  2022    impacted teeth numbers 1, 17,and 32.; Surgical extraction soft tissue impaction # 16    WISDOM TOOTH EXTRACTION  Not sure       Family History:   Family History   Problem Relation Age of Onset    Anxiety disorder Mother     Depression Mother     Anxiety disorder Father     Depression Father     Cancer Maternal Grandfather     Alcohol abuse Maternal Grandmother     Anxiety disorder Sister      "Depression Sister         half-sister    Depression Paternal Aunt     Breast cancer Neg Hx     Ovarian cancer Neg Hx     Uterine cancer Neg Hx     Colon cancer Neg Hx        Social History:   Social History     Socioeconomic History    Marital status: Single   Tobacco Use    Smoking status: Never    Smokeless tobacco: Never    Tobacco comments:     Former vape user   Vaping Use    Vaping status: Former    Substances: Nicotine   Substance and Sexual Activity    Alcohol use: Not Currently    Drug use: Never    Sexual activity: Yes     Partners: Male     Birth control/protection: None     Comment: Currently pregnant       Immunizations:   Immunization History   Administered Date(s) Administered    ABRYSVO (RSV, 60+ or pregnant women 32-36 wks) 09/27/2024    COVID-19 (PFIZER) Purple Cap Monovalent 12/10/2021    Covid-19 (Pfizer) Gray Cap Monovalent 01/26/2022    Fluzone  >6mos 09/20/2024    Fluzone Quad >6mos (Multi-dose) 12/10/2021    Influenza, Unspecified 12/10/2021    Tdap 08/29/2024    flucelvax quad pfs =>4 YRS 11/28/2018        Medications:     Current Outpatient Medications:     Prenatal Vit-Fe Fumarate-FA (prenatal vitamin 27-0.8) 27-0.8 MG tablet tablet, Take  by mouth Daily., Disp: , Rfl:     sertraline (ZOLOFT) 25 MG tablet, Take 1 tablet by mouth Daily. Daily total 75 mg, Disp: 30 tablet, Rfl: 5    sertraline (Zoloft) 50 MG tablet, Take 1 tablet by mouth Daily., Disp: 30 tablet, Rfl: 2    Allergies:   No Known Allergies    Objective     Vital Signs  /68   Pulse 85   Ht 167.6 cm (65.98\")   Wt 63.1 kg (139 lb 3.2 oz)   SpO2 95%   BMI 22.48 kg/m²   Estimated body mass index is 22.48 kg/m² as calculated from the following:    Height as of this encounter: 167.6 cm (65.98\").    Weight as of this encounter: 63.1 kg (139 lb 3.2 oz).    BMI is within normal parameters. No other follow-up for BMI required.       Physical Exam  Vitals and nursing note reviewed.   Constitutional:       Appearance: Normal " appearance. She is normal weight.   HENT:      Head: Normocephalic and atraumatic.      Nose: Nose normal.      Mouth/Throat:      Mouth: Mucous membranes are moist.   Eyes:      Extraocular Movements: Extraocular movements intact.      Pupils: Pupils are equal, round, and reactive to light.   Cardiovascular:      Rate and Rhythm: Normal rate.   Pulmonary:      Effort: Pulmonary effort is normal.   Musculoskeletal:         General: Normal range of motion.      Cervical back: Normal range of motion.   Skin:     General: Skin is warm.   Neurological:      General: No focal deficit present.      Mental Status: She is alert and oriented to person, place, and time.   Psychiatric:         Mood and Affect: Mood normal.         Behavior: Behavior normal.         Thought Content: Thought content normal.         Judgment: Judgment normal.            Procedures     Results:  No results found for this or any previous visit (from the past 24 hours).     Assessment and Plan     Assessment/Plan:  Diagnoses and all orders for this visit:    1. Current moderate episode of major depressive disorder without prior episode (Primary)  Assessment & Plan:  Patient's depression is a recurrent episode that is moderate without psychosis. Depression is active and improving with treatment.    Plan:   Continue current medication therapy     Followup in 3 months.           Follow Up  Return in about 3 months (around 1/14/2025) for Annual physical.    Charlene Meneses DO   [facility]

## 2024-10-25 ENCOUNTER — ROUTINE PRENATAL (OUTPATIENT)
Dept: OBSTETRICS AND GYNECOLOGY | Facility: CLINIC | Age: 29
End: 2024-10-25
Payer: MEDICAID

## 2024-10-25 ENCOUNTER — LAB (OUTPATIENT)
Dept: LAB | Facility: HOSPITAL | Age: 29
End: 2024-10-25
Payer: MEDICAID

## 2024-10-25 VITALS — BODY MASS INDEX: 22.77 KG/M2 | DIASTOLIC BLOOD PRESSURE: 62 MMHG | SYSTOLIC BLOOD PRESSURE: 118 MMHG | WEIGHT: 141 LBS

## 2024-10-25 DIAGNOSIS — Z34.03 FIRST PREGNANCY, THIRD TRIMESTER: ICD-10-CM

## 2024-10-25 DIAGNOSIS — Z34.03 FIRST PREGNANCY, THIRD TRIMESTER: Primary | ICD-10-CM

## 2024-10-25 LAB
GLUCOSE UR STRIP-MCNC: NEGATIVE MG/DL
PROT UR STRIP-MCNC: NEGATIVE MG/DL

## 2024-10-25 PROCEDURE — 87081 CULTURE SCREEN ONLY: CPT

## 2024-10-25 NOTE — PROGRESS NOTES
OB FOLLOW UP  CC- Here for care of pregnancy        Kingsley Grimm is a 28 y.o.  36w4d patient being seen today for her obstetrical follow up visit. Patient reports occasional contractions and mild swelling.     Her prenatal care is complicated by (and status) : see below.  Patient Active Problem List   Diagnosis    Schizoaffective disorder, depressive type    Anorexia nervosa    Major depressive disorder, single episode, unspecified       GBS Status: Done Today. She is not allergic to PCN.    No Known Allergies       RSV status: already given   Her Delivery Plan is: Undecided    US today: no  Non Stress Test: No.    ROS -   Patient Denies: Loss of Fluid and Vaginal Spotting  Fetal Movement : normal  All other systems reviewed and are negative.       The additional following portions of the patient's history were reviewed and updated as appropriate: allergies and current medications.    I have reviewed and agree with the HPI, ROS, and historical information as entered above. Rosmery Ramirez MD        EXAM:     Prenatal Vitals  BP: 118/62  Weight: 64 kg (141 lb)   Fetal Heart Rate: 140              Urine Glucose Read-only: Negative  Urine Protein Read-only: Negative           Assessment and Plan    Problem List Items Addressed This Visit    None  Visit Diagnoses       First pregnancy, third trimester    -  Primary    Relevant Orders    POC Urinalysis Dipstick (Completed)    Group B Streptococcus Culture - Swab, Vaginal/Rectum            Pregnancy at 36w4d  Fetal status reassuring.   Reviewed Pre-eclampsia signs/symptoms  Delivery options reviewed with patient  Signs of labor reviewed  Kick counts reviewed  Activity and Exercise discussed.  Return in about 1 week (around 2024).    Rosmery Ramirez MD  10/25/2024

## 2024-10-28 LAB — BACTERIA SPEC AEROBE CULT: NORMAL

## 2024-10-31 ENCOUNTER — ROUTINE PRENATAL (OUTPATIENT)
Dept: OBSTETRICS AND GYNECOLOGY | Facility: CLINIC | Age: 29
End: 2024-10-31
Payer: MEDICAID

## 2024-10-31 VITALS — SYSTOLIC BLOOD PRESSURE: 112 MMHG | WEIGHT: 139 LBS | DIASTOLIC BLOOD PRESSURE: 64 MMHG | BODY MASS INDEX: 22.45 KG/M2

## 2024-10-31 DIAGNOSIS — Z34.93 PRENATAL CARE IN THIRD TRIMESTER: Primary | ICD-10-CM

## 2024-10-31 LAB
GLUCOSE UR STRIP-MCNC: NEGATIVE MG/DL
PROT UR STRIP-MCNC: NEGATIVE MG/DL

## 2024-10-31 NOTE — PROGRESS NOTES
OB FOLLOW UP  CC- Here for care of pregnancy        Kingsley Grimm is a 28 y.o.  37w3d patient being seen today for her obstetrical follow up visit. Patient reports occasional headaches with some vision changes, nausea.     Her prenatal care is complicated by (and status) : see below.  Patient Active Problem List   Diagnosis    Schizoaffective disorder, depressive type    Anorexia nervosa    Major depressive disorder, single episode, unspecified       GBS Status:   Group B Strep Culture   Date Value Ref Range Status   10/25/2024 No Group B Streptococcus isolated  Final         No Known Allergies       Flu Status: Already given in current flu season  Her Delivery Plan is: Undecided    US today: no  Non Stress Test: No.    ROS -   Patient Denies: Loss of Fluid, Vaginal Spotting, Vomiting , Contractions, Epigastric pain, and skin itching  Fetal Movement : normal  All other systems reviewed and are negative.       The additional following portions of the patient's history were reviewed and updated as appropriate: allergies and current medications.    I have reviewed and agree with the HPI, ROS, and historical information as entered above. Rosmery Ramirez MD        EXAM:     Prenatal Vitals  BP: 112/64  Weight: 63 kg (139 lb)   Fetal Heart Rate: 145              Urine Glucose Read-only: Negative  Urine Protein Read-only: Negative           Assessment and Plan    Problem List Items Addressed This Visit    None  Visit Diagnoses       Prenatal care in third trimester    -  Primary    Relevant Orders    POC Urinalysis Dipstick (Completed)            Pregnancy at 37w3d  Fetal status reassuring.   Reviewed Pre-eclampsia signs/symptoms  Discussed options for IOL. Patient desires spontaneous labor. Would like to postpone an IOL unless medically indicated.   Delivery options reviewed with patient  Signs of labor reviewed  Kick counts reviewed  Activity and Exercise discussed.  Return in about 1 week (around  11/7/2024).    Rosmery Ramirez MD  10/31/2024

## 2024-11-07 ENCOUNTER — ROUTINE PRENATAL (OUTPATIENT)
Dept: OBSTETRICS AND GYNECOLOGY | Facility: CLINIC | Age: 29
End: 2024-11-07
Payer: MEDICAID

## 2024-11-07 VITALS — SYSTOLIC BLOOD PRESSURE: 96 MMHG | BODY MASS INDEX: 22.9 KG/M2 | DIASTOLIC BLOOD PRESSURE: 64 MMHG | WEIGHT: 141.8 LBS

## 2024-11-07 DIAGNOSIS — Z34.90 PRENATAL CARE, ANTEPARTUM: Primary | ICD-10-CM

## 2024-11-07 LAB
GLUCOSE UR STRIP-MCNC: NEGATIVE MG/DL
PROT UR STRIP-MCNC: NEGATIVE MG/DL

## 2024-11-07 NOTE — PROGRESS NOTES
OB FOLLOW UP  CC- Here for care of pregnancy        Kingsley Grimm is a 28 y.o.  38w3d patient being seen today for her obstetrical follow up visit. Patient reports irregular cramping/ BH contractions. She reports HA with vision changes that has happened intermittently over the past few months. She reports sometimes Tylenol and position changes helps HA. She reports swelling in feet more when she is upon her feet a lot.     Her prenatal care is complicated by (and status) : see below.  Patient Active Problem List   Diagnosis    Schizoaffective disorder, depressive type    Anorexia nervosa    Major depressive disorder, single episode, unspecified       GBS Status: NEG  Group B Strep Culture   Date Value Ref Range Status   10/25/2024 No Group B Streptococcus isolated  Final         No Known Allergies       Flu Status: Already given in current flu season  Her Delivery Plan is: Does not desire IOL    US today: no  Non Stress Test: No.    ROS -   Patient Denies: Loss of Fluid, Vaginal Spotting, Nausea , Vomiting , Epigastric pain, and skin itching  Fetal Movement : normal  All other systems reviewed and are negative.       The additional following portions of the patient's history were reviewed and updated as appropriate: allergies, current medications, past family history, past medical history, past social history, past surgical history, and problem list.    I have reviewed and agree with the HPI, ROS, and historical information as entered above. Rosmery Ramirez MD        EXAM: cx /    Prenatal Vitals  BP: 96/64  Weight: 64.3 kg (141 lb 12.8 oz)   Fetal Heart Rate: 145              Urine Glucose Read-only: Negative  Urine Protein Read-only: Negative           Assessment and Plan    Problem List Items Addressed This Visit    None  Visit Diagnoses       Prenatal care, antepartum    -  Primary    Relevant Orders    POC Urinalysis Dipstick (Completed)            Pregnancy at 38w3d  Fetal status reassuring.    Reviewed Pre-eclampsia signs/symptoms  Discussed options for IOL. Patient desires spontaneous labor. Would like to postpone an IOL unless medically indicated.   Delivery options reviewed with patient  Signs of labor reviewed  Kick counts reviewed  Activity and Exercise discussed.  Return in about 1 week (around 11/14/2024).    Rosmery Ramirez MD  11/07/2024

## 2024-11-14 ENCOUNTER — ROUTINE PRENATAL (OUTPATIENT)
Dept: OBSTETRICS AND GYNECOLOGY | Facility: CLINIC | Age: 29
End: 2024-11-14
Payer: MEDICAID

## 2024-11-14 VITALS — BODY MASS INDEX: 22.87 KG/M2 | WEIGHT: 141.6 LBS | SYSTOLIC BLOOD PRESSURE: 110 MMHG | DIASTOLIC BLOOD PRESSURE: 70 MMHG

## 2024-11-14 DIAGNOSIS — Z34.93 PRENATAL CARE IN THIRD TRIMESTER: Primary | ICD-10-CM

## 2024-11-14 LAB
GLUCOSE UR STRIP-MCNC: NEGATIVE MG/DL
PROT UR STRIP-MCNC: NEGATIVE MG/DL

## 2024-11-14 NOTE — PROGRESS NOTES
OB FOLLOW UP  CC- Here for care of pregnancy        Kingsley Grimm is a 28 y.o.  39w3d patient being seen today for her obstetrical follow up visit. Patient reports that she was having frequent ctx that were between 5-10 minutes over the weekend. Patient reports headaches, is unsure of vision changes. Reports nausea, denies vomiting. Reports epigastric pain. Reports itching in hands and feet, unsure if worse at night.     Her prenatal care is complicated by (and status) :   Patient Active Problem List   Diagnosis    Schizoaffective disorder, depressive type    Anorexia nervosa    Major depressive disorder, single episode, unspecified       GBS Status: negative  Group B Strep Culture   Date Value Ref Range Status   10/25/2024 No Group B Streptococcus isolated  Final         No Known Allergies       Flu Status: Already given in current flu season  Her Delivery Plan is: Does not desire IOL    US today: no  Non Stress Test: No.    ROS -   Patient Denies: Loss of Fluid, Vaginal Spotting, and Vomiting   Fetal Movement : normal  All other systems reviewed and are negative.       The additional following portions of the patient's history were reviewed and updated as appropriate: allergies, current medications, past family history, past medical history, past social history, past surgical history, and problem list.    I have reviewed and agree with the HPI, ROS, and historical information as entered above. Rosmery Ramirez MD        EXAM:     Prenatal Vitals  BP: 110/70  Weight: 64.2 kg (141 lb 9.6 oz)   Fetal Heart Rate: 125              Urine Glucose Read-only: Negative  Urine Protein Read-only: Negative           Assessment and Plan    Problem List Items Addressed This Visit    None  Visit Diagnoses       Prenatal care in third trimester    -  Primary    Relevant Orders    POC Urinalysis Dipstick (Completed)            Pregnancy at 39w3d  Fetal status reassuring.   Reviewed Pre-eclampsia signs/symptoms  Discussed  options for IOL. Patient desires spontaneous labor. Would like to postpone an IOL unless medically indicated.   NSAT   Delivery options reviewed with patient  Signs of labor reviewed  Kick counts reviewed  Activity and Exercise discussed.  Return in about 1 week (around 11/21/2024) for NST then .  NST next visit ands pt wants no induction     Rosmery Ramirez MD  11/14/2024

## 2024-11-18 ENCOUNTER — HOSPITAL ENCOUNTER (INPATIENT)
Facility: HOSPITAL | Age: 29
LOS: 1 days | Discharge: PSYCHIATRIC OR SUBSTANCE USE FACILITY | End: 2024-11-19
Attending: OBSTETRICS & GYNECOLOGY | Admitting: OBSTETRICS & GYNECOLOGY
Payer: MEDICAID

## 2024-11-18 ENCOUNTER — ANESTHESIA (OUTPATIENT)
Dept: LABOR AND DELIVERY | Facility: HOSPITAL | Age: 29
End: 2024-11-18
Payer: MEDICAID

## 2024-11-18 ENCOUNTER — ANESTHESIA EVENT (OUTPATIENT)
Dept: LABOR AND DELIVERY | Facility: HOSPITAL | Age: 29
End: 2024-11-18
Payer: MEDICAID

## 2024-11-18 PROBLEM — Z37.9 NORMAL LABOR: Status: ACTIVE | Noted: 2024-11-18

## 2024-11-18 PROBLEM — Z3A.40 40 WEEKS GESTATION OF PREGNANCY: Status: ACTIVE | Noted: 2024-11-18

## 2024-11-18 LAB
ABO GROUP BLD: NORMAL
ABO GROUP BLD: NORMAL
ALP SERPL-CCNC: 246 U/L (ref 39–117)
ALT SERPL W P-5'-P-CCNC: 10 U/L (ref 1–33)
AST SERPL-CCNC: 27 U/L (ref 1–32)
BILIRUB SERPL-MCNC: 0.3 MG/DL (ref 0–1.2)
BLD GP AB SCN SERPL QL: NEGATIVE
CREAT SERPL-MCNC: 0.64 MG/DL (ref 0.57–1)
DEPRECATED RDW RBC AUTO: 46.4 FL (ref 37–54)
ERYTHROCYTE [DISTWIDTH] IN BLOOD BY AUTOMATED COUNT: 14.2 % (ref 12.3–15.4)
HCT VFR BLD AUTO: 35.1 % (ref 34–46.6)
HGB BLD-MCNC: 12.1 G/DL (ref 12–15.9)
LDH SERPL-CCNC: 217 U/L (ref 135–214)
MCH RBC QN AUTO: 31 PG (ref 26.6–33)
MCHC RBC AUTO-ENTMCNC: 34.5 G/DL (ref 31.5–35.7)
MCV RBC AUTO: 90 FL (ref 79–97)
PLATELET # BLD AUTO: 149 10*3/MM3 (ref 140–450)
PMV BLD AUTO: 12.9 FL (ref 6–12)
RBC # BLD AUTO: 3.9 10*6/MM3 (ref 3.77–5.28)
RH BLD: POSITIVE
RH BLD: POSITIVE
T&S EXPIRATION DATE: NORMAL
TREPONEMA PALLIDUM IGG+IGM AB [PRESENCE] IN SERUM OR PLASMA BY IMMUNOASSAY: NORMAL
URATE SERPL-MCNC: 5.4 MG/DL (ref 2.4–5.7)
WBC NRBC COR # BLD AUTO: 9.49 10*3/MM3 (ref 3.4–10.8)

## 2024-11-18 PROCEDURE — 59025 FETAL NON-STRESS TEST: CPT

## 2024-11-18 PROCEDURE — 82247 BILIRUBIN TOTAL: CPT | Performed by: OBSTETRICS & GYNECOLOGY

## 2024-11-18 PROCEDURE — 84460 ALANINE AMINO (ALT) (SGPT): CPT | Performed by: OBSTETRICS & GYNECOLOGY

## 2024-11-18 PROCEDURE — 25010000002 BUPIVACAINE (PF) 0.25 % SOLUTION: Performed by: ANESTHESIOLOGY

## 2024-11-18 PROCEDURE — 25010000002 LIDOCAINE-EPINEPHRINE (PF) 1.5 %-1:200000 SOLUTION: Performed by: ANESTHESIOLOGY

## 2024-11-18 PROCEDURE — 86901 BLOOD TYPING SEROLOGIC RH(D): CPT | Performed by: OBSTETRICS & GYNECOLOGY

## 2024-11-18 PROCEDURE — C1755 CATHETER, INTRASPINAL: HCPCS | Performed by: ANESTHESIOLOGY

## 2024-11-18 PROCEDURE — 84075 ASSAY ALKALINE PHOSPHATASE: CPT | Performed by: OBSTETRICS & GYNECOLOGY

## 2024-11-18 PROCEDURE — 83615 LACTATE (LD) (LDH) ENZYME: CPT | Performed by: OBSTETRICS & GYNECOLOGY

## 2024-11-18 PROCEDURE — 82565 ASSAY OF CREATININE: CPT | Performed by: OBSTETRICS & GYNECOLOGY

## 2024-11-18 PROCEDURE — 80307 DRUG TEST PRSMV CHEM ANLYZR: CPT | Performed by: OBSTETRICS & GYNECOLOGY

## 2024-11-18 PROCEDURE — 86780 TREPONEMA PALLIDUM: CPT | Performed by: OBSTETRICS & GYNECOLOGY

## 2024-11-18 PROCEDURE — 25010000002 FENTANYL CITRATE (PF) 50 MCG/ML SOLUTION: Performed by: ANESTHESIOLOGY

## 2024-11-18 PROCEDURE — 84450 TRANSFERASE (AST) (SGOT): CPT | Performed by: OBSTETRICS & GYNECOLOGY

## 2024-11-18 PROCEDURE — 86850 RBC ANTIBODY SCREEN: CPT | Performed by: OBSTETRICS & GYNECOLOGY

## 2024-11-18 PROCEDURE — 84550 ASSAY OF BLOOD/URIC ACID: CPT | Performed by: OBSTETRICS & GYNECOLOGY

## 2024-11-18 PROCEDURE — 0KQM0ZZ REPAIR PERINEUM MUSCLE, OPEN APPROACH: ICD-10-PCS | Performed by: OBSTETRICS & GYNECOLOGY

## 2024-11-18 PROCEDURE — 86900 BLOOD TYPING SEROLOGIC ABO: CPT | Performed by: OBSTETRICS & GYNECOLOGY

## 2024-11-18 PROCEDURE — 86901 BLOOD TYPING SEROLOGIC RH(D): CPT

## 2024-11-18 PROCEDURE — 25010000002 FENTANYL CITRATE (PF) 50 MCG/ML SOLUTION: Performed by: OBSTETRICS & GYNECOLOGY

## 2024-11-18 PROCEDURE — 86900 BLOOD TYPING SEROLOGIC ABO: CPT

## 2024-11-18 PROCEDURE — 85027 COMPLETE CBC AUTOMATED: CPT | Performed by: OBSTETRICS & GYNECOLOGY

## 2024-11-18 PROCEDURE — 25010000002 ROPIVACAINE PER 1 MG: Performed by: ANESTHESIOLOGY

## 2024-11-18 RX ORDER — DOCUSATE SODIUM 100 MG/1
100 CAPSULE, LIQUID FILLED ORAL 2 TIMES DAILY
Status: DISCONTINUED | OUTPATIENT
Start: 2024-11-18 | End: 2024-11-19 | Stop reason: HOSPADM

## 2024-11-18 RX ORDER — SERTRALINE HYDROCHLORIDE 25 MG/1
25 TABLET, FILM COATED ORAL DAILY
Status: DISCONTINUED | OUTPATIENT
Start: 2024-11-18 | End: 2024-11-18

## 2024-11-18 RX ORDER — ONDANSETRON 2 MG/ML
4 INJECTION INTRAMUSCULAR; INTRAVENOUS EVERY 6 HOURS PRN
Status: DISCONTINUED | OUTPATIENT
Start: 2024-11-18 | End: 2024-11-18 | Stop reason: HOSPADM

## 2024-11-18 RX ORDER — ACETAMINOPHEN 325 MG/1
650 TABLET ORAL EVERY 6 HOURS PRN
Status: DISCONTINUED | OUTPATIENT
Start: 2024-11-18 | End: 2024-11-19 | Stop reason: HOSPADM

## 2024-11-18 RX ORDER — HYDROCORTISONE 25 MG/G
1 CREAM TOPICAL AS NEEDED
Status: DISCONTINUED | OUTPATIENT
Start: 2024-11-18 | End: 2024-11-19 | Stop reason: HOSPADM

## 2024-11-18 RX ORDER — IBUPROFEN 600 MG/1
600 TABLET, FILM COATED ORAL EVERY 6 HOURS PRN
Status: DISCONTINUED | OUTPATIENT
Start: 2024-11-18 | End: 2024-11-19 | Stop reason: HOSPADM

## 2024-11-18 RX ORDER — LIDOCAINE HYDROCHLORIDE AND EPINEPHRINE 15; 5 MG/ML; UG/ML
INJECTION, SOLUTION EPIDURAL AS NEEDED
Status: DISCONTINUED | OUTPATIENT
Start: 2024-11-18 | End: 2024-11-18 | Stop reason: SURG

## 2024-11-18 RX ORDER — FENTANYL CITRATE 50 UG/ML
50 INJECTION, SOLUTION INTRAMUSCULAR; INTRAVENOUS
Status: DISCONTINUED | OUTPATIENT
Start: 2024-11-18 | End: 2024-11-19 | Stop reason: HOSPADM

## 2024-11-18 RX ORDER — MISOPROSTOL 200 UG/1
800 TABLET ORAL AS NEEDED
Status: DISCONTINUED | OUTPATIENT
Start: 2024-11-18 | End: 2024-11-18 | Stop reason: HOSPADM

## 2024-11-18 RX ORDER — SODIUM CHLORIDE 0.9 % (FLUSH) 0.9 %
10 SYRINGE (ML) INJECTION EVERY 12 HOURS SCHEDULED
Status: DISCONTINUED | OUTPATIENT
Start: 2024-11-18 | End: 2024-11-19 | Stop reason: HOSPADM

## 2024-11-18 RX ORDER — PROMETHAZINE HYDROCHLORIDE 25 MG/1
25 TABLET ORAL EVERY 6 HOURS PRN
Status: DISCONTINUED | OUTPATIENT
Start: 2024-11-18 | End: 2024-11-19 | Stop reason: HOSPADM

## 2024-11-18 RX ORDER — HYDROCODONE BITARTRATE AND ACETAMINOPHEN 10; 325 MG/1; MG/1
1 TABLET ORAL EVERY 4 HOURS PRN
Status: DISCONTINUED | OUTPATIENT
Start: 2024-11-18 | End: 2024-11-19 | Stop reason: HOSPADM

## 2024-11-18 RX ORDER — DIPHENHYDRAMINE HYDROCHLORIDE 50 MG/ML
12.5 INJECTION INTRAMUSCULAR; INTRAVENOUS EVERY 8 HOURS PRN
Status: DISCONTINUED | OUTPATIENT
Start: 2024-11-18 | End: 2024-11-18 | Stop reason: HOSPADM

## 2024-11-18 RX ORDER — ROPIVACAINE HYDROCHLORIDE 2 MG/ML
15 INJECTION, SOLUTION EPIDURAL; INFILTRATION; PERINEURAL CONTINUOUS
Status: DISCONTINUED | OUTPATIENT
Start: 2024-11-18 | End: 2024-11-19 | Stop reason: HOSPADM

## 2024-11-18 RX ORDER — CITRIC ACID/SODIUM CITRATE 334-500MG
30 SOLUTION, ORAL ORAL ONCE
Status: DISCONTINUED | OUTPATIENT
Start: 2024-11-18 | End: 2024-11-18 | Stop reason: HOSPADM

## 2024-11-18 RX ORDER — SODIUM CHLORIDE 9 MG/ML
40 INJECTION, SOLUTION INTRAVENOUS AS NEEDED
Status: DISCONTINUED | OUTPATIENT
Start: 2024-11-18 | End: 2024-11-18 | Stop reason: HOSPADM

## 2024-11-18 RX ORDER — OXYTOCIN/0.9 % SODIUM CHLORIDE 30/500 ML
125 PLASTIC BAG, INJECTION (ML) INTRAVENOUS ONCE AS NEEDED
Status: DISCONTINUED | OUTPATIENT
Start: 2024-11-18 | End: 2024-11-19 | Stop reason: HOSPADM

## 2024-11-18 RX ORDER — BUPIVACAINE HYDROCHLORIDE 2.5 MG/ML
INJECTION, SOLUTION EPIDURAL; INFILTRATION; INTRACAUDAL AS NEEDED
Status: DISCONTINUED | OUTPATIENT
Start: 2024-11-18 | End: 2024-11-18 | Stop reason: SURG

## 2024-11-18 RX ORDER — ONDANSETRON 2 MG/ML
4 INJECTION INTRAMUSCULAR; INTRAVENOUS EVERY 6 HOURS PRN
Status: DISCONTINUED | OUTPATIENT
Start: 2024-11-18 | End: 2024-11-18 | Stop reason: SDUPTHER

## 2024-11-18 RX ORDER — FENTANYL CITRATE 50 UG/ML
INJECTION, SOLUTION INTRAMUSCULAR; INTRAVENOUS AS NEEDED
Status: DISCONTINUED | OUTPATIENT
Start: 2024-11-18 | End: 2024-11-18 | Stop reason: SURG

## 2024-11-18 RX ORDER — SODIUM CHLORIDE 0.9 % (FLUSH) 0.9 %
10 SYRINGE (ML) INJECTION AS NEEDED
Status: DISCONTINUED | OUTPATIENT
Start: 2024-11-18 | End: 2024-11-19 | Stop reason: HOSPADM

## 2024-11-18 RX ORDER — ONDANSETRON 4 MG/1
4 TABLET, ORALLY DISINTEGRATING ORAL EVERY 6 HOURS PRN
Status: DISCONTINUED | OUTPATIENT
Start: 2024-11-18 | End: 2024-11-18 | Stop reason: SDUPTHER

## 2024-11-18 RX ORDER — SODIUM CHLORIDE 0.9 % (FLUSH) 0.9 %
10 SYRINGE (ML) INJECTION AS NEEDED
Status: DISCONTINUED | OUTPATIENT
Start: 2024-11-18 | End: 2024-11-18 | Stop reason: HOSPADM

## 2024-11-18 RX ORDER — ACETAMINOPHEN 325 MG/1
650 TABLET ORAL EVERY 4 HOURS PRN
Status: DISCONTINUED | OUTPATIENT
Start: 2024-11-18 | End: 2024-11-18 | Stop reason: SDUPTHER

## 2024-11-18 RX ORDER — IBUPROFEN 600 MG/1
600 TABLET, FILM COATED ORAL EVERY 6 HOURS PRN
Status: DISCONTINUED | OUTPATIENT
Start: 2024-11-18 | End: 2024-11-18 | Stop reason: HOSPADM

## 2024-11-18 RX ORDER — PROMETHAZINE HYDROCHLORIDE 12.5 MG/1
12.5 TABLET ORAL EVERY 6 HOURS PRN
Status: DISCONTINUED | OUTPATIENT
Start: 2024-11-18 | End: 2024-11-18 | Stop reason: HOSPADM

## 2024-11-18 RX ORDER — HYDROCODONE BITARTRATE AND ACETAMINOPHEN 5; 325 MG/1; MG/1
1 TABLET ORAL EVERY 4 HOURS PRN
Status: DISCONTINUED | OUTPATIENT
Start: 2024-11-18 | End: 2024-11-19 | Stop reason: HOSPADM

## 2024-11-18 RX ORDER — OXYTOCIN/0.9 % SODIUM CHLORIDE 30/500 ML
30 PLASTIC BAG, INJECTION (ML) INTRAVENOUS CONTINUOUS
Status: ACTIVE | OUTPATIENT
Start: 2024-11-18 | End: 2024-11-18

## 2024-11-18 RX ORDER — BISACODYL 10 MG
10 SUPPOSITORY, RECTAL RECTAL DAILY PRN
Status: DISCONTINUED | OUTPATIENT
Start: 2024-11-19 | End: 2024-11-19 | Stop reason: HOSPADM

## 2024-11-18 RX ORDER — LIDOCAINE HYDROCHLORIDE 10 MG/ML
0.5 INJECTION, SOLUTION EPIDURAL; INFILTRATION; INTRACAUDAL; PERINEURAL ONCE AS NEEDED
Status: DISCONTINUED | OUTPATIENT
Start: 2024-11-18 | End: 2024-11-18 | Stop reason: HOSPADM

## 2024-11-18 RX ORDER — MORPHINE SULFATE 2 MG/ML
2 INJECTION, SOLUTION INTRAMUSCULAR; INTRAVENOUS
Status: DISCONTINUED | OUTPATIENT
Start: 2024-11-18 | End: 2024-11-18 | Stop reason: HOSPADM

## 2024-11-18 RX ORDER — FENTANYL CITRATE 50 UG/ML
100 INJECTION, SOLUTION INTRAMUSCULAR; INTRAVENOUS
Status: DISCONTINUED | OUTPATIENT
Start: 2024-11-18 | End: 2024-11-19 | Stop reason: HOSPADM

## 2024-11-18 RX ORDER — METHYLERGONOVINE MALEATE 0.2 MG/ML
200 INJECTION INTRAVENOUS ONCE AS NEEDED
Status: DISCONTINUED | OUTPATIENT
Start: 2024-11-18 | End: 2024-11-18 | Stop reason: HOSPADM

## 2024-11-18 RX ORDER — ONDANSETRON 4 MG/1
4 TABLET, ORALLY DISINTEGRATING ORAL EVERY 6 HOURS PRN
Status: DISCONTINUED | OUTPATIENT
Start: 2024-11-18 | End: 2024-11-18 | Stop reason: HOSPADM

## 2024-11-18 RX ORDER — OXYTOCIN/0.9 % SODIUM CHLORIDE 30/500 ML
30 PLASTIC BAG, INJECTION (ML) INTRAVENOUS ONCE
Status: COMPLETED | OUTPATIENT
Start: 2024-11-18 | End: 2024-11-18

## 2024-11-18 RX ORDER — EPHEDRINE SULFATE 5 MG/ML
10 INJECTION INTRAVENOUS
Status: DISCONTINUED | OUTPATIENT
Start: 2024-11-18 | End: 2024-11-18 | Stop reason: HOSPADM

## 2024-11-18 RX ORDER — CARBOPROST TROMETHAMINE 250 UG/ML
250 INJECTION, SOLUTION INTRAMUSCULAR AS NEEDED
Status: DISCONTINUED | OUTPATIENT
Start: 2024-11-18 | End: 2024-11-18 | Stop reason: HOSPADM

## 2024-11-18 RX ORDER — ACETAMINOPHEN 325 MG/1
650 TABLET ORAL EVERY 4 HOURS PRN
Status: DISCONTINUED | OUTPATIENT
Start: 2024-11-18 | End: 2024-11-18 | Stop reason: HOSPADM

## 2024-11-18 RX ORDER — OXYCODONE AND ACETAMINOPHEN 10; 325 MG/1; MG/1
1 TABLET ORAL EVERY 4 HOURS PRN
Status: DISCONTINUED | OUTPATIENT
Start: 2024-11-18 | End: 2024-11-18 | Stop reason: HOSPADM

## 2024-11-18 RX ORDER — OXYCODONE AND ACETAMINOPHEN 5; 325 MG/1; MG/1
1 TABLET ORAL EVERY 4 HOURS PRN
Status: DISCONTINUED | OUTPATIENT
Start: 2024-11-18 | End: 2024-11-18 | Stop reason: HOSPADM

## 2024-11-18 RX ORDER — PROMETHAZINE HYDROCHLORIDE 12.5 MG/1
12.5 SUPPOSITORY RECTAL EVERY 6 HOURS PRN
Status: DISCONTINUED | OUTPATIENT
Start: 2024-11-18 | End: 2024-11-18 | Stop reason: HOSPADM

## 2024-11-18 RX ORDER — TERBUTALINE SULFATE 1 MG/ML
0.25 INJECTION, SOLUTION SUBCUTANEOUS AS NEEDED
Status: DISCONTINUED | OUTPATIENT
Start: 2024-11-18 | End: 2024-11-18 | Stop reason: HOSPADM

## 2024-11-18 RX ORDER — MAGNESIUM CARB/ALUMINUM HYDROX 105-160MG
30 TABLET,CHEWABLE ORAL ONCE
Status: DISCONTINUED | OUTPATIENT
Start: 2024-11-18 | End: 2024-11-18 | Stop reason: HOSPADM

## 2024-11-18 RX ORDER — SODIUM CHLORIDE, SODIUM LACTATE, POTASSIUM CHLORIDE, CALCIUM CHLORIDE 600; 310; 30; 20 MG/100ML; MG/100ML; MG/100ML; MG/100ML
75 INJECTION, SOLUTION INTRAVENOUS CONTINUOUS
Status: DISCONTINUED | OUTPATIENT
Start: 2024-11-18 | End: 2024-11-19 | Stop reason: HOSPADM

## 2024-11-18 RX ORDER — OXYTOCIN/0.9 % SODIUM CHLORIDE 30/500 ML
PLASTIC BAG, INJECTION (ML) INTRAVENOUS
Status: COMPLETED
Start: 2024-11-18 | End: 2024-11-18

## 2024-11-18 RX ORDER — SODIUM CHLORIDE 0.9 % (FLUSH) 0.9 %
10 SYRINGE (ML) INJECTION EVERY 12 HOURS SCHEDULED
Status: DISCONTINUED | OUTPATIENT
Start: 2024-11-18 | End: 2024-11-18 | Stop reason: HOSPADM

## 2024-11-18 RX ORDER — FAMOTIDINE 10 MG/ML
20 INJECTION, SOLUTION INTRAVENOUS ONCE AS NEEDED
Status: DISCONTINUED | OUTPATIENT
Start: 2024-11-18 | End: 2024-11-18 | Stop reason: HOSPADM

## 2024-11-18 RX ORDER — SERTRALINE HYDROCHLORIDE 25 MG/1
25 TABLET, FILM COATED ORAL NIGHTLY
Status: DISCONTINUED | OUTPATIENT
Start: 2024-11-18 | End: 2024-11-19 | Stop reason: HOSPADM

## 2024-11-18 RX ADMIN — LIDOCAINE HYDROCHLORIDE AND EPINEPHRINE 3 ML: 15; 5 INJECTION, SOLUTION EPIDURAL at 06:36

## 2024-11-18 RX ADMIN — Medication: at 17:08

## 2024-11-18 RX ADMIN — LIDOCAINE HYDROCHLORIDE AND EPINEPHRINE 2 ML: 15; 5 INJECTION, SOLUTION EPIDURAL at 06:37

## 2024-11-18 RX ADMIN — BUPIVACAINE HYDROCHLORIDE 8 ML: 2.5 INJECTION, SOLUTION EPIDURAL; INFILTRATION; INTRACAUDAL; PERINEURAL at 06:38

## 2024-11-18 RX ADMIN — Medication 1 APPLICATION: at 17:08

## 2024-11-18 RX ADMIN — WITCH HAZEL 1 PAD: 500 SOLUTION RECTAL; TOPICAL at 17:08

## 2024-11-18 RX ADMIN — HYDROCORTISONE 2.5% 1 APPLICATION: 25 CREAM TOPICAL at 17:08

## 2024-11-18 RX ADMIN — ROPIVACAINE HYDROCHLORIDE 14 ML/HR: 2 INJECTION, SOLUTION EPIDURAL; INFILTRATION at 06:42

## 2024-11-18 RX ADMIN — IBUPROFEN 600 MG: 600 TABLET, FILM COATED ORAL at 17:08

## 2024-11-18 RX ADMIN — Medication 30 UNITS: at 10:17

## 2024-11-18 RX ADMIN — FENTANYL CITRATE 50 MCG: 50 INJECTION, SOLUTION INTRAMUSCULAR; INTRAVENOUS at 05:47

## 2024-11-18 RX ADMIN — FENTANYL CITRATE 100 MCG: 50 INJECTION, SOLUTION INTRAMUSCULAR; INTRAVENOUS at 06:38

## 2024-11-18 NOTE — NURSING NOTE
This writer advised Luis Antonio CHS of patient status , mental health issues and refusal of any type of care to be given by nursing or tech r/t assessment and vital signs being done.Advised that Andreea DAMON was involved in situation.Advised him at this time we did not feel safe letting infant be in the room because there was no one at the bedside at this time.

## 2024-11-18 NOTE — L&D DELIVERY NOTE
" [facility]   Vaginal Delivery Note    Patient Name: Kingsley Grimm  : 1995  MRN: [id number]    Date of Delivery: 2024    Diagnosis     Pre & Post-Delivery:  Intrauterine pregnancy at 40w0d  Labor status: Spontaneous Onset of Labor    Normal labor    Normal labor    40 weeks gestation of pregnancy             Problem List    Transfer to Postpartum     Review the Delivery Report for details.     Delivery     Delivery: Vaginal, Spontaneous    YOB: 2022   Time of Birth:  Gestational Age 10:13 AM  40w0d     Anesthesia: Epidural    Delivering clinician: Rosmery Ramirez   Forceps?   No   Vacuum? No    Shoulder dystocia present: No        Delivery narrative: Patient pushed for 15 minutes.  She did not want to push but spontaneously delivered vaginally over no episiotomy vertex a female.  Baby cried delivered cord cut and clamped after being milked cord blood obtained placenta expressed uterus explored.  There was a second-degree tear about 4 cm long that was repaired with 2-0 chromic running lock stitch.  EBL was 200 patient and family doing okay.      Infant     Findings: female infant     Infant observations: Weight: 2880 g (6 lb 5.6 oz)  Length: 18.75 in  Observations/Comments:        Apgars: 8  @ 1 minute /    9  @ 5 minutes   Infant Name:      Placenta & Cord         Placenta delivered  Spontaneous at   2024 10:19 AM    Cord: 3 vessels present.   Nuchal Cord?  no   Cord blood obtained: Yes   Cord gases obtained:  No   Cord gas results: Venous:  No results found for: \"PHCVEN\", \"BECVEN\"    Arterial:  No results found for: \"PHCART\", \"BECART\"     Repair     Episiotomy: None    No    Lacerations: Yes  Laceration Information  Laceration Repaired?   Perineal: 2nd Yes   Periurethral:       Labial:       Sulcus:       Vaginal:       Cervical:         Suture used for repair: 2-0 chromic gut  Laceration Length for 3rd or 4th degree lacerations: 4 cm second-degree tear.  Cm   Estimated " Blood Loss:       Quantitative Blood Loss:    QBL from VAG DEL: 200 (11/18/24 1032)     Complications     none    Disposition     Mother to Mother Baby/Postpartum  in stable condition currently.  Baby to NBN  in stable condition currently.    Rosmery Ramirez MD  11/18/24  11:27 EST

## 2024-11-18 NOTE — NURSING NOTE
"Received report from Africa WASSERMAN.  Went to pt room to assess and do vital signs, pt had refused to have a PCT.  Pt had her eyes closed.  Introduced myself as her RN and that I would need to check and do vital signs.  Pt didn't respond, I repeated myself as not to startle pt.  Pt did not respond.  I placed a hand on pts knee to let her know I was in the room and not startle her.  She stated \"don't fucking touch me.\"  I stated my name and purpose for being in the room.  She said no, refusing a fundal check and a vital sign check.  I explained in detail that for her safety and for us to be able to check to see if a patient has appropriate blood loss or is hemmoraging that we need to check her and do an assessment.  She grunted multiple times.  I stated I was unable to understand her but wanted to ask if I could check her vitals and do a fundal massage after explaining why there is need for that.  She raised up put her hands on her face and yelled 'I said fucking no, you don't understand fucking anything' She then looked at me and acted like she was going to pull her IV out but never did. She did this twice.  I told her I would give her sometime and come back with her nay products.  She said I was \"fucking hilarious,\" and then said \"no you fucking aren't hilarious at all.\"  I asked if she wanted her lights turned out while I was gone so she could rest.  She grunted and I couldn't understand her so I said I'm going to turn them off, if you want them back on you can use your call bell its right beside your arm in the bed, you can also use that to call out if need us for anything.  I then reiterated that if she felt a constant trickle of blood or large gushes or needed to pee to call for us, I told her I knew she had not slept and that was hard but to please remember that we are here to take care of her. Multiple times when I spoke she would interrupt and I would look at the computer as she fumed and cussed.        "

## 2024-11-18 NOTE — LACTATION NOTE
PCN states patient does not want anyone to see her at this time. PCN to let lactation know if/when patient is ready for help.   Sharona WASSERMAN, RNC-MNN, CLC

## 2024-11-18 NOTE — Clinical Note
Pt ambulated to the restroom with her mother.  When I entered the room she stated she was in pain, her mother asked me to bring meds.  I brought in 600 mg of motron, asked the pt to rate her pain, she would not speak, her mother asked her repeatedly to rate her pain but she did not

## 2024-11-18 NOTE — CASE MANAGEMENT/SOCIAL WORK
"Continued Stay Note  [facility]     Patient Name: Kingsley Grimm  MRN: [id number]  Today's Date: 11/18/2024    Admit Date: 11/18/2024    Plan: Will follow   Discharge Plan       Row Name 11/18/24 1504       Plan    Plan Will follow    Plan Comments Pt transferred to post partum unit. She has been refusing to allow RN to assess or provide care. MSW called pt's emergency contact Zahra Mendoza, pt's mother, at [phone] states pt has h/o anxiety and depression. Reports when pt is overwhelmed she will shut down. Her mother states she has guardian over the pt but has not provided any guardianship papers. Mother reports the court should release the guardianship. MSW asks if pt can make her own decisions and pt's mother states \"yes.\" Pt's mother reports she will come back and  stay with pt and that FOB will stay overnight with her.                   Discharge Codes    No documentation.                       NELSON Parra    "

## 2024-11-18 NOTE — PAYOR COMM NOTE
"Alfa Kingsley Garcia \"Ines\" (29 y.o. Female)     From: Julia Harrington LPN, Utilization Review  Phone #[phone]  Fax #[phone]    [place] Medicaid ID#[id number]     Delivery Information          Date of Birth   1995    Social Security Number       Address   [address] [place] [address]    Home Phone       MRN   [id number]       Mu-ism   Non-Quaker    Marital Status   Single                            Admission Date   11/18/24    Admission Type   Elective    Admitting Provider   Theresa Rodriguez MD    Attending Provider   Rosmery Ramirez MD    Department, Room/Bed   [facility] MOTHER BABY 4A, N420/1       Discharge Date       Discharge Disposition       Discharge Destination                                 Attending Provider: Rosmery Ramirez MD    Allergies: No Known Allergies    Isolation: None   Infection: None   Code Status: CPR    Ht: 167.6 cm (66\")   Wt: 64.2 kg (141 lb 9.6 oz)    Admission Cmt: None   Principal Problem: Normal labor [O80,Z37.9]                   Active Insurance as of 11/18/2024       Primary Coverage       Payor Plan Insurance Group Employer/Plan Group    ANTHEM MEDICAID ANTHEM MEDICAID [id number]       Payor Plan Address Payor Plan Phone Number Payor Plan Fax Number Effective Dates    [address] [address] [phone]  11/1/2021 - None Entered    [facility] [id number]         Subscriber Name Subscriber Birth Date Member ID       KINGSLEY MONTALVO 1995 [id number]                     Emergency Contacts        (Rel.) Home Phone Work Phone Mobile Phone    JENNIFER HENDRICKSON (Mother) [phone] -- --    ALVIN MADRIGAL (Significant Other) [phone] -- --              Insurance Information                  ANTHEM MEDICAID/ANTHEM MEDICAID Phone: [phone]    Subscriber: Alfa Kingsleybrenda Garcia Subscriber#: [id number]    Group#: [id number] Precert#: --    Authorization#: -- Effective Date: --             History & Physical "        Rosmery Ramirez MD at 24 1124          [facility]  Obstetric History and Physical    Chief Complaint   Patient presents with    Contractions       Subjective    Patient is a 29 y.o. female  currently at 40w0d, who presents with labor at term.  Patient has had issues with mental health with prior suicidal attempts.  Patient has been doing well however..    Her prenatal care is benign.  Her previous obstetric/gynecological history is noted for is non-contributory.    The following portions of the patients history were reviewed and updated as appropriate: current medications .       Prenatal Information:  Prenatal Results       Initial Prenatal Labs       Test Value Reference Range Date Time    Hemoglobin  12.1 g/dL 11.1 - 15.9 24 1608    Hematocrit  35.7 % 34.0 - 46.6 24 1608    Platelets  151 x10E3/uL 150 - 450 24 1608    Rubella IgG  1.11 index Immune >0.99 24 1608    Hepatitis B SAg  Negative  Negative 24 1608    Hepatitis C Ab  Non Reactive  Non Reactive 24 1608    RPR  Non Reactive  Non Reactive 24 1122       Non Reactive  Non Reactive 24 1608    T. Pallidum Ab   Non-Reactive  Non-Reactive 24 0254    ABO  O   24 0754    Rh  Positive   24 0754    Antibody Screen  Negative  Negative 24 1608    HIV  Non Reactive  Non Reactive 24 1608    Urine Culture  Final report   24        Final report   24 1608    Gonorrhea        Chlamydia        TSH        HgB A1c         Varicella IgG        Hemoglobinopathy Fractionation        Hemoglobinopathy (genetic testing)        Cystic fibrosis         Spinal muscular atrophy        Fragile X                  Fetal testing        Test Value Reference Range Date Time    NIPT        MSAFP        AFP-4                  2nd and 3rd Trimester       Test Value Reference Range Date Time    Hemoglobin (repeated)  12.1 g/dL 12.0 - 15.9 24 0254       10.2 g/dL 12.0 - 15.9  08/29/24 1122    Hematocrit (repeated)  35.1 % 34.0 - 46.6 11/18/24 0254       29.6 % 34.0 - 46.6 08/29/24 1122    Platelets   149 10*3/mm3 140 - 450 11/18/24 0254       146 10*3/mm3 140 - 450 08/29/24 1122       151 x10E3/uL 150 - 450 04/04/24 1608    1 hour GTT   108 mg/dL 65 - 139 08/29/24 1122    Antibody Screen (repeated)  Negative   11/18/24 0251       Negative  Negative 08/29/24 1122    3rd TM syphilis scrn (repeated)  RPR   Non Reactive  Non Reactive 08/29/24 1122    3rd TM syphilis scrn (repeated) TP-Ab  Non-Reactive  Non-Reactive 11/18/24 0254    3rd TM syphilis screen TB-Ab (FTA)  Non-Reactive  Non-Reactive 11/18/24 0254    Syphilis cascade test TP-Ab (EIA)        Syphilis cascade TPPA        GTT Fasting        GTT 1 Hr        GTT 2 Hr        GTT 3 Hr        Group B Strep  No Group B Streptococcus isolated   10/25/24 1822              Other testing        Test Value Reference Range Date Time    Parvo IgG         CMV IgG                   Drug Screening       Test Value Reference Range Date Time    Amphetamine Screen  Negative ng/mL Bslwqa=8575 04/04/24 1608    Barbiturate Screen  Negative ng/mL Ycywku=810 04/04/24 1608    Benzodiazepine Screen  Negative ng/mL Wynnxn=661 04/04/24 1608    Methadone Screen  Negative ng/mL Wfgblz=363 04/04/24 1608    Phencyclidine Screen  Negative ng/mL Cutoff=25 04/04/24 1608    Opiates Screen  Negative ng/mL Cujsqd=503 04/04/24 1608    THC Screen  Negative ng/mL Cutoff=20 04/04/24 1608    Cocaine Screen  Negative ng/mL Whsaot=040 04/04/24 1608    Propoxyphene Screen  Negative ng/mL Jjuiuv=547 04/04/24 1608    Buprenorphine Screen        Methamphetamine Screen        Oxycodone Screen        Tricyclic Antidepressants Screen                  Legend    ^: Historical                          External Prenatal Results       Pregnancy Outside Results - Transcribed From Office Records - See Scanned Records For Details       Test Value Date Time    ABO  O  11/18/24 0754    Rh   Positive  11/18/24 0754    Antibody Screen  Negative  11/18/24 0251       Negative  08/29/24 1122       Negative  04/04/24 1608    Varicella IgG       Rubella  1.11 index 04/04/24 1608    Hgb  12.1 g/dL 11/18/24 0254       10.2 g/dL 08/29/24 1122       12.1 g/dL 04/04/24 1608    Hct  35.1 % 11/18/24 0254       29.6 % 08/29/24 1122       35.7 % 04/04/24 1608    HgB A1c        1h GTT  108 mg/dL 08/29/24 1122    3h GTT Fasting       3h GTT 1 hour       3h GTT 2 hour       3h GTT 3 hour        Gonorrhea (discrete)       Chlamydia (discrete)       RPR  Non Reactive  08/29/24 1122       Non Reactive  04/04/24 1608    Syphils cascade: TP-Ab (FTA)  Non-Reactive  11/18/24 0254    TP-Ab  Non-Reactive  11/18/24 0254    TP-Ab (EIA)       TPPA       HBsAg  Negative  04/04/24 1608    Herpes Simplex Virus PCR       Herpes Simplex VIrus Culture       HIV  Non Reactive  04/04/24 1608    Hep C RNA Quant PCR       Hep C Antibody  Non Reactive  04/04/24 1608    AFP       NIPT       Cystic Fibrosis (Dashawn)       Cystic Fibroisis        Spinal Muscular atrophy       Fragile X       Group B Strep  No Group B Streptococcus isolated  10/25/24 1822    GBS Susceptibility to Clindamycin       GBS Susceptibility to Erythromycin       Fetal Fibronectin       Genetic Testing, Maternal Blood                 Drug Screening       Test Value Date Time    Urine Drug Screen       Amphetamine Screen  Negative ng/mL 04/04/24 1608    Barbiturate Screen  Negative ng/mL 04/04/24 1608    Benzodiazepine Screen  Negative ng/mL 04/04/24 1608    Methadone Screen  Negative ng/mL 04/04/24 1608    Phencyclidine Screen  Negative ng/mL 04/04/24 1608    Opiates Screen       THC Screen       Cocaine Screen       Propoxyphene Screen  Negative ng/mL 04/04/24 1608    Buprenorphine Screen       Methamphetamine Screen       Oxycodone Screen       Tricyclic Antidepressants Screen                 Legend    ^: Historical                             Past OB History:     OB  History    Para Term  AB Living   2 1 1 0 1 1   SAB IAB Ectopic Molar Multiple Live Births   1 0 0 0 0 1      # Outcome Date GA Lbr Reji/2nd Weight Sex Type Anes PTL Lv   2 Term 24 40w0d 09:32 / 01:11 2880 g (6 lb 5.6 oz) F Vag-Spont EPI N ABE      Name: Kimberlyn Grimm      Apgar1: 8  Apgar5: 9   1 SAB 10/09/23 9w0d    SAB          Past Medical History: Past Medical History:   Diagnosis Date    Anxiety     Depression     Eating disorder     History of drug overdose 2021    intentional, with ibuprofen    History of suicidal ideation 2022    with paranoia    Psychosis     SAB (spontaneous )       Past Surgical History Past Surgical History:   Procedure Laterality Date    D & C WITH SUCTION  10/09/23    DILATATION AND EVACUATION  10/2023    MULTIPLE TOOTH EXTRACTIONS  2022    impacted teeth numbers 1, 17,and 32.; Surgical extraction soft tissue impaction # 16    WISDOM TOOTH EXTRACTION  Not sure      Family History: Family History   Problem Relation Age of Onset    Anxiety disorder Mother     Depression Mother     Anxiety disorder Father     Depression Father     Cancer Maternal Grandfather     Alcohol abuse Maternal Grandmother     Anxiety disorder Sister     Depression Sister         half-sister    Depression Paternal Aunt     Breast cancer Neg Hx     Ovarian cancer Neg Hx     Uterine cancer Neg Hx     Colon cancer Neg Hx       Social History:  reports that she has never smoked. She has never used smokeless tobacco.   reports that she does not currently use alcohol.   reports no history of drug use.        General ROS: Pertinent items are noted in HPI    Objective      Vital Signs Range for the last 24 hours  Temperature: Temp:  [97.6 °F (36.4 °C)-97.7 °F (36.5 °C)] 97.6 °F (36.4 °C)   Temp Source: Temp src: Axillary   BP: BP: ()/(57-94) 111/79   Pulse: Heart Rate:  [57-97] 61   Respirations: Resp:  [15-18] 15   SPO2: SpO2:  [95 %-97 %] 95 %   O2 Amount (l/min):    "  O2 Devices Device (Oxygen Therapy): room air   Weight:       Physical Examination: General appearance - alert, well appearing, and in no distress    Presentation: Vertex   Cervix: Exam by:     Dilation:     Effacement:     Station:         Fetal Heart Rate Assessment   Method:     Beats/min:     Baseline:     Varibility:     Accels:     Decels:     Tracing Category:       Uterine Assessment   Method:     Frequency (min):     Ctx Count in 10 min:     Duration:     Intensity:     Intensity by IUPC:     Resting Tone:     Resting Tone by IUPC:     Opp Units:       Laboratory Results:   Radiology Review: Other Studies:     Assessment & Plan      Normal labor    Normal labor    40 weeks gestation of pregnancy        Assessment:  1.  Intrauterine pregnancy at 40w0d weeks gestation with reactive fetal status.    2.  Anticipate vaginal birth.  3.  Obstetrical history significant for is non-contributory.  4.  GBS status: No results found for: \"GBSANTIGEN\"    Plan:  1.  Anticipate vaginal birth  2. Plan of care has been reviewed with patient and family  3.  Risks, benefits of treatment plan have been discussed.  4.  All questions have been answered.  5.        Rosmery Ramirez MD  2024  11:26 EST    Electronically signed by Rosmery Ramirez MD at 24 1127       Facility-Administered Medications as of 2024   Medication Dose Route Frequency Provider Last Rate Last Admin    fentaNYL citrate (PF) (SUBLIMAZE) injection 50 mcg  50 mcg Intravenous Q1H PRN Theresa Rodriguez MD   50 mcg at 24 0547    Or    fentaNYL citrate (PF) (SUBLIMAZE) injection 100 mcg  100 mcg Intravenous Q1H PRN Theresa Rodriguez MD        lactated ringers infusion  75 mL/hr Intravenous Continuous Theresa Rodriguez MD        [COMPLETED] oxytocin (PITOCIN) 30 units in 0.9% sodium chloride 500 mL (premix)  30 Units Intravenous Once Theresa Rodriguez MD   30 Units at 24 1017    Followed by    [] oxytocin (PITOCIN) 30 units " "in 0.9% sodium chloride 500 mL (premix)  30 Units Intravenous Continuous Theresa Rodriguez MD   Rate Change at 11/18/24 1032    ropivacaine (NAROPIN) 0.2 % injection  15 mL/hr Epidural Continuous Galdino Wright DO   Stopped at 11/18/24 1025    sodium chloride 0.9 % flush 10 mL  10 mL Intravenous Q12H Theresa Rodriguez MD        sodium chloride 0.9 % flush 10 mL  10 mL Intravenous PRN Theresa Rodriguez MD         Orders (last 24 hrs)        Start     Ordered    11/18/24 1232  VTE Prophylaxis Not Indicated: Low Risk; No Risk Factors (0)  Once         11/18/24 1232    11/18/24 1127  Case Management  Consult  Once        Provider:  (Not yet assigned)    11/18/24 1128    11/18/24 1100  oxytocin (PITOCIN) 30 units in 0.9% sodium chloride 500 mL (premix)  Continuous        Placed in \"Followed by\" Linked Group    11/18/24 0957    11/18/24 0958  Nurse may remove epidural catheter after delivery.  Until Discontinued         11/18/24 0957    11/18/24 0958  Transfer to postpartum when discharge criteria met.  Until Discontinued         11/18/24 0957    11/18/24 0957  Notify Physician (specified)  Until Discontinued         11/18/24 0957    11/18/24 0957  Vital Signs Per hospital policy  Per Hospital Policy         11/18/24 0957    11/18/24 0957  Fundal & Lochia Check  Per Order Details        Comments: Every 15 Minutes x4, Then Every 30 Minutes x2, Then Every Shift    11/18/24 0957    11/18/24 0957  Fundal & Lochia Check  Every Shift       11/18/24 0957    11/18/24 0957  Diet: Regular/House; Fluid Consistency: Thin (IDDSI 0)  Diet Effective Now         11/18/24 0957    11/18/24 0957  Blood Gas, Arterial, Cord  Once        Comments: If requested by provider at the time of delivery      11/18/24 0957    11/18/24 0957  oxytocin (PITOCIN) 30 units in 0.9% sodium chloride 500 mL (premix)  Once        Placed in \"Followed by\" Linked Group    11/18/24 0957    11/18/24 0957  acetaminophen (TYLENOL) tablet 650 mg  " "Every 4 Hours PRN,   Status:  Discontinued         11/18/24 0957 11/18/24 0957  ibuprofen (ADVIL,MOTRIN) tablet 600 mg  Every 6 Hours PRN,   Status:  Discontinued         11/18/24 0957 11/18/24 0957  oxyCODONE-acetaminophen (PERCOCET) 5-325 MG per tablet 1 tablet  Every 4 Hours PRN,   Status:  Discontinued         11/18/24 0957 11/18/24 0957  oxyCODONE-acetaminophen (PERCOCET)  MG per tablet 1 tablet  Every 4 Hours PRN,   Status:  Discontinued         11/18/24 0957 11/18/24 0957  methylergonovine (METHERGINE) injection 200 mcg  Once As Needed,   Status:  Discontinued         11/18/24 0957 11/18/24 0957  carboprost (HEMABATE) injection 250 mcg  As Needed,   Status:  Discontinued         11/18/24 0957 11/18/24 0957  miSOPROStol (CYTOTEC) tablet 800 mcg  As Needed,   Status:  Discontinued         11/18/24 0957 11/18/24 0957  ondansetron ODT (ZOFRAN-ODT) disintegrating tablet 4 mg  Every 6 Hours PRN,   Status:  Discontinued        Placed in \"Or\" Linked Group    11/18/24 0957 11/18/24 0957  ondansetron (ZOFRAN) injection 4 mg  Every 6 Hours PRN,   Status:  Discontinued        Placed in \"Or\" Linked Group    11/18/24 0957 11/18/24 0900  sodium chloride 0.9 % flush 10 mL  Every 12 Hours Scheduled         11/18/24 0215    11/18/24 0900  sodium chloride 0.9 % flush 10 mL  Every 12 Hours Scheduled,   Status:  Discontinued         11/18/24 0310    11/18/24 0725  ABO RH Specimen Verification  STAT         11/18/24 0724    11/18/24 0715  ropivacaine (NAROPIN) 0.2 % injection  Continuous         11/18/24 0620 11/18/24 0715  Sod Citrate-Citric Acid (BICITRA) oral solution 30 mL  Once,   Status:  Discontinued         11/18/24 0620 11/18/24 0621  Vital Signs Per Anesthesia Guidelines  Per Order Details        Comments: Every 3 Minutes x20 Minutes Following Epidural Dosing, Then Every 15 Minutes If Stable    11/18/24 0620 11/18/24 0621  Start IV with #16 or #18 gauge angiocath.  Once         " 11/18/24 0620 11/18/24 0621  Nurse or anesthesiologist to remain with patient for 15 minutes following dosing.  Until Discontinued         11/18/24 0620 11/18/24 0621  Facilitate maternal postion on side and maintain uterine displacement.  Until Discontinued         11/18/24 0620 11/18/24 0621  Consult anesthesia services prior to changing epidural infusion/rate.  Until Discontinued         11/18/24 0620 11/18/24 0620  famotidine (PEPCID) injection 20 mg  Once As Needed,   Status:  Discontinued         11/18/24 0620 11/18/24 0620  diphenhydrAMINE (BENADRYL) injection 12.5 mg  Every 8 Hours PRN,   Status:  Discontinued         11/18/24 0620 11/18/24 0620  lactated ringers bolus 1,000 mL  As Needed,   Status:  Discontinued         11/18/24 0620 11/18/24 0620  ePHEDrine Sulfate (Pressors) 5 MG/ML injection 10 mg  Every 10 Minutes PRN,   Status:  Discontinued         11/18/24 0620 11/18/24 0400  Vital Signs q 4 while awake  Every 4 Hours      Comments: While the patient is awake.    11/18/24 0310 11/18/24 0400  lactated ringers bolus 1,000 mL  Once,   Status:  Discontinued         11/18/24 0310 11/18/24 0400  lactated ringers infusion  Continuous         11/18/24 0310 11/18/24 0400  mineral oil liquid 30 mL  Once,   Status:  Discontinued         11/18/24 0310 11/18/24 0310  Admit To Obstetrics Inpatient  Once         11/18/24 0310 11/18/24 0310  Code Status and Medical Interventions: CPR (Attempt to Resuscitate); Full Support  Continuous         11/18/24 0310 11/18/24 0310  Obtain informed consent  Once         11/18/24 0310 11/18/24 0310  VTE Prophylaxis Not Indicated: Low Risk; No Risk Factors (0)  Once         11/18/24 0310 11/18/24 0310  Vital Signs Per hospital policy  Per Hospital Policy         11/18/24 0310 11/18/24 0310  Continuous Fetal Monitoring With NST on Admission and Prior to Initiation of Oxytocin.  Per Order Details        Comments: Continuous Fetal  Monitoring With NST on Admission & Prior to Initiation of Oxytocin.    24  External Uterine Contraction Monitoring  Per Hospital Policy         24  Notify Physician (specified)  Until Discontinued         24  Notify physician for tachysystole (per hospital algorithm)  Until Discontinued         24  Notify physician if membranes ruptured, bleeding greater than 1 pad an hour, fetal heart tone abnormality, and severe pain  Until Discontinued         24  Up Ad Maricel  Until Discontinued         24  Cervical Exam  Once        Comments: Unless contraindicated, every 1-2 hours in active labor, or at nurse's discretion.    24  Initiate Group Beta Strep (GBS) Prophylaxis Protocol, If Criteria Met  Continuous        Comments: NO TREATMENT RECOMMENDED IF: 1)  Maternal GBS status known negative 2)  Scheduled  birth with intact membranes, not in labor.  3 ) Maternal GBS unknown, no risk factors.   TREAT WITH ANTIBIOTICS IF:  1)  Maternal GBS status is known postive.  2)  Maternal GBS status unknown with these risk factors:  a)  Previous infant affected by GBS infection.  b)  GBS urinary tract infection (UTI) or bacteruria during pregnancy  c)  Unexplained maternal fever in labor (greater than or equal to 100.4F or 38.0C)  d)  Prolonged rupture of the membranes greater than or equal to 18 hours.  e)  Gestational age less than 37 weeks.    24  Assess Need for Indwelling Urinary Catheter - Follow Removal Protocol  Continuous        Comments: Indwelling Urinary Catheter Removal Criteria  Discontinue Indwelling Urinary Catheter Unless One of the Following is Present:  Urinary Retention or Obstruction  Chronic Urinary Catheter Use  End of Life  Critical Illness with Strict I/O   Tract or Abdominal  "Surgery  Stage 3/4 Sacral / Perineal Wound  Required Activity Restriction: Trauma  Required Activity Restriction: Spine Surgery  If Patient is Being Followed by Urology Contact Them PRIOR to Removal  Do Not Remove Indwelling Urinary Catheter Order is Present with a CLINICAL REASON to Maintain the Catheter. Provider is Required to Include a Clinical Reason to Maintain a Urinary Catheter    Patient Admitted With Indwelling Urinary Catheter (Not Placed at [facility])  Assess for Continued Need & Document Medical Necessity  If Infection is Suspected, Contact the Provider       Placed in \"And\" Linked Group    11/18/24 0310 11/18/24 0310  Urinary Catheter Care  Every Shift      Placed in \"And\" Linked Group    11/18/24 0310 11/18/24 0310  NPO Diet NPO Type: Ice Chips  Diet Effective Now,   Status:  Canceled         11/18/24 0310 11/18/24 0310  Insert Peripheral IV  Once         11/18/24 0310 11/18/24 0310  Saline Lock & Maintain IV Access  Continuous         11/18/24 0310 11/18/24 0309  sodium chloride 0.9 % infusion 40 mL  As Needed,   Status:  Discontinued         11/18/24 0310 11/18/24 0309  lidocaine PF 1% (XYLOCAINE) injection 0.5 mL  Once As Needed,   Status:  Discontinued         11/18/24 0310 11/18/24 0309  acetaminophen (TYLENOL) tablet 650 mg  Every 4 Hours PRN,   Status:  Discontinued         11/18/24 0310 11/18/24 0309  morphine injection 2 mg  Every 2 Hours PRN,   Status:  Discontinued         11/18/24 0310 11/18/24 0309  morphine injection 2 mg  Every 2 Hours PRN,   Status:  Discontinued         11/18/24 0310 11/18/24 0309  ondansetron ODT (ZOFRAN-ODT) disintegrating tablet 4 mg  Every 6 Hours PRN,   Status:  Discontinued        Placed in \"Or\" Linked Group    11/18/24 0310 11/18/24 0309  ondansetron (ZOFRAN) injection 4 mg  Every 6 Hours PRN,   Status:  Discontinued        Placed in \"Or\" Linked Group    11/18/24 0310 11/18/24 0309  promethazine (PHENERGAN) suppository " "12.5 mg  Every 6 Hours PRN,   Status:  Discontinued        Placed in \"Or\" Linked Group    11/18/24 0310 11/18/24 0309  promethazine (PHENERGAN) tablet 12.5 mg  Every 6 Hours PRN,   Status:  Discontinued        Placed in \"Or\" Linked Group    11/18/24 0310 11/18/24 0309  terbutaline (BRETHINE) injection 0.25 mg  As Needed,   Status:  Discontinued         11/18/24 0310 11/18/24 0309  sodium chloride 0.9 % flush 10 mL  As Needed,   Status:  Discontinued         11/18/24 0310 11/18/24 0217  Treponema pallidum AB w/Reflex RPR  Once         11/18/24 0216 11/18/24 0216  CBC (No Diff)  STAT         11/18/24 0215 11/18/24 0216  Preeclampsia Panel  STAT         11/18/24 0215 11/18/24 0215  fentaNYL citrate (PF) (SUBLIMAZE) injection 50 mcg  Every 1 Hour PRN        Placed in \"Or\" Linked Group    11/18/24 0216 11/18/24 0215  fentaNYL citrate (PF) (SUBLIMAZE) injection 100 mcg  Every 1 Hour PRN        Placed in \"Or\" Linked Group    11/18/24 0216 11/18/24 0215  Insert Peripheral IV  Once         11/18/24 0215 11/18/24 0215  PERIPHERAL IV CARE - Connectors / Hubs Must Be Scrubbed 15 Seconds Using 70% Alcohol & Allowed to Dry Before Accessing Line  Continuous         11/18/24 0215 11/18/24 0215  Type & Screen  STAT         11/18/24 0215 11/18/24 0214  sodium chloride 0.9 % flush 10 mL  As Needed         11/18/24 0215    Unscheduled  Change Peripheral IV Site  As Needed      Comments: Frequency Per Facility Policy    11/18/24 0215    Unscheduled  PERIPHERAL IV CARE - Change Dressing As Needed When Damp, Loose or Soiled  As Needed       11/18/24 0215    Unscheduled  Position change  As Needed      Comments: For intra-uterine resuscitation for hypertonus, hypertstimulation, or non-reassuring fetal status    11/18/24 0310    Unscheduled  Insert Indwelling Urinary Catheter  As Needed        Comments: EPIDURAL PLACEMENT   Placed in \"And\" Linked Group    11/18/24 0310    Unscheduled  Up with Assistance  " As Needed       11/18/24 0957    Unscheduled  Apply Ice to Perineum  As Needed       11/18/24 0957    Unscheduled  Bladder Assessment  As Needed       11/18/24 0957    Signed and Held  Transfer Patient  Once         Signed and Held    Signed and Held  sertraline (ZOLOFT) tablet 25 mg  Daily         Signed and Held    Signed and Held  sertraline (ZOLOFT) tablet 50 mg  Daily         Signed and Held    Signed and Held  Code Status and Medical Interventions: CPR (Attempt to Resuscitate); Full  Continuous         Signed and Held    Signed and Held  Vital Signs Per hospital policy  Per Hospital Policy         Signed and Held    Signed and Held  Notify Physician  Until Discontinued         Signed and Held    Signed and Held  Up Ad Maricel  Until Discontinued         Signed and Held    Signed and Held  Ambulate Patient  Every Shift       Signed and Held    Signed and Held  Patient May Shower  Once         Signed and Held    Signed and Held  Diet: Regular/House; Fluid Consistency: Thin (IDDSI 0)  Diet Effective Now         Signed and Held    Signed and Held  Advance Diet As Tolerated -Regular  Until Discontinued         Signed and Held    Signed and Held  Fundal and Lochia Check  Per Hospital Policy        Comments: Q 15 min x 4, Q 30 min x 2, then Q Shift    Signed and Held    Signed and Held  RN to Assess Rh Status & Place RhIG Evaluation Order if Indicated  Continuous         Signed and Held    Signed and Held  Bladder Assessment  Per Order Details        Comments: Postpartum 1) Upon Admission to Unit & Every 4 Hours PRN Until Voiding. 2) Out of Bed to Void in 8 Hours.    Signed and Held    Signed and Held  Straight Cath  Per Order Details        Comments: Postpartum: If Distended & Unable to Void, May Repeat Once.    Signed and Held    Signed and Held  Indwelling Urinary Catheter  Per Order Details        Comments: Postpartum : After Straight Cathed x2 or if Greater Than 1000mL Residual, Insert Indwelling Urinary Catheter  "Until Further MD Order.    Signed and Held    Signed and Held  Apply Ice to Perineum  As Needed        Comments: For 20 min q 2 hrs    Signed and Held    Signed and Held  Waffle Cushion  As Needed        Comments: For perineal discomfort    Signed and Held    Signed and Held  Sitz Bath  3 Times Daily        Comments: PRN    Signed and Held    Signed and Held  Kpad  As Needed        Comments: For pain    Signed and Held    Signed and Held  Warm compress  As Needed         Signed and Held    Signed and Held  Breast pump to bed  Once         Signed and Held    Signed and Held  Apply ace wrap, tight bra, or binder  As Needed         Signed and Held    Signed and Held  Apply ice packs  As Needed         Signed and Held    Signed and Held  If indicated -- Please administer RH Immunoglobulin based on results of cord blood evaluation and fetal screen lab tests, pharmacy to dispense  Per Order Details        Comments: See Process Instructions For Reference Range Details.    Signed and Held    Signed and Held  CBC & Differential  Timed        Comments: Postpartum Day 1      Signed and Held    Signed and Held  Hemoglobin & Hematocrit, Blood  Timed        Comments: Postpartum Day 1      Signed and Held    Signed and Held  oxytocin (PITOCIN) 30 units in 0.9% sodium chloride 500 mL (premix)  Once As Needed         Signed and Held    Signed and Held  ibuprofen (ADVIL,MOTRIN) tablet 600 mg  Every 6 Hours PRN         Signed and Held    Signed and Held  acetaminophen (TYLENOL) tablet 650 mg  Every 6 Hours PRN         Signed and Held    Signed and Held  HYDROcodone-acetaminophen (NORCO) 5-325 MG per tablet 1 tablet  Every 4 Hours PRN        Placed in \"Or\" Linked Group    Signed and Held    Signed and Held  HYDROcodone-acetaminophen (NORCO)  MG per tablet 1 tablet  Every 4 Hours PRN        Placed in \"Or\" Linked Group    Signed and Held    Signed and Held  bisacodyl (DULCOLAX) suppository 10 mg  Daily PRN         Signed and Held "    Signed and Held  magnesium hydroxide (MILK OF MAGNESIA) suspension 10 mL  Daily PRN         Signed and Held    Signed and Held  docusate sodium (COLACE) capsule 100 mg  2 Times Daily         Signed and Held    Signed and Held  lanolin topical 1 Application  Every 1 Hour PRN         Signed and Held    Signed and Held  benzocaine-menthol (DERMOPLAST) 20-0.5 % topical spray  As Needed         Signed and Held    Signed and Held  witch hazel-glycerin (TUCKS) pad 1 Pad  As Needed         Signed and Held    Signed and Held  Hydrocortisone (Perianal) (ANUSOL-HC) 2.5 % rectal cream 1 Application  As Needed         Signed and Held    Signed and Held  benzocaine (AMERICAINE) 20 % rectal ointment 1 Application  As Needed         Signed and Held    Signed and Held  promethazine (PHENERGAN) tablet 25 mg  Every 6 Hours PRN         Signed and Held    Signed and Held  Measles, Mumps & Rubella Vac (MMR) injection 0.5 mL  During Hospitalization         Signed and Held                     Operative/Procedure Notes (last 24 hours)        Rosmery Ramirez MD at [address] [place] [address]   Vaginal Delivery Note    Patient Name: Kingsley Grimm  : 1995  MRN: [id number]    Date of Delivery: 2024    Diagnosis     Pre & Post-Delivery:  Intrauterine pregnancy at 40w0d  Labor status: Spontaneous Onset of Labor    Normal labor    Normal labor    40 weeks gestation of pregnancy             Problem List    Transfer to Postpartum     Review the Delivery Report for details.     Delivery     Delivery: Vaginal, Spontaneous    YOB: 2022   Time of Birth:  Gestational Age 10:13 AM  40w0d     Anesthesia: Epidural    Delivering clinician: Rosmery Ramirez   Forceps?   No   Vacuum? No    Shoulder dystocia present: No        Delivery narrative: Patient pushed for 15 minutes.  She did not want to push but spontaneously delivered vaginally over no episiotomy vertex a female.  Baby cried delivered cord cut and  "clamped after being milked cord blood obtained placenta expressed uterus explored.  There was a second-degree tear about 4 cm long that was repaired with 2-0 chromic running lock stitch.  EBL was 200 patient and family doing okay.      Infant     Findings: female infant     Infant observations: Weight: 2880 g (6 lb 5.6 oz)  Length: 18.75 in  Observations/Comments:        Apgars: 8  @ 1 minute /    9  @ 5 minutes   Infant Name:      Placenta & Cord         Placenta delivered  Spontaneous at   11/18/2024 10:19 AM    Cord: 3 vessels present.   Nuchal Cord?  no   Cord blood obtained: Yes   Cord gases obtained:  No   Cord gas results: Venous:  No results found for: \"PHCVEN\", \"BECVEN\"    Arterial:  No results found for: \"PHCART\", \"BECART\"     Repair     Episiotomy: None    No    Lacerations: Yes  Laceration Information  Laceration Repaired?   Perineal: 2nd Yes   Periurethral:       Labial:       Sulcus:       Vaginal:       Cervical:         Suture used for repair: 2-0 chromic gut  Laceration Length for 3rd or 4th degree lacerations: 4 cm second-degree tear.  Cm   Estimated Blood Loss:       Quantitative Blood Loss:    QBL from VAG DEL: 200 (11/18/24 1032)     Complications     none    Disposition     Mother to Mother Baby/Postpartum  in stable condition currently.  Baby to NBN  in stable condition currently.    Rosmery Ramirez MD  11/18/24  11:27 EST          Electronically signed by Rosmery Ramirez MD at 11/18/24 1128       Physician Progress Notes (last 24 hours)  Notes from 11/17/24 1325 through 11/18/24 1325   No notes of this type exist for this encounter.       "

## 2024-11-18 NOTE — NURSING NOTE
Pt's mother called out to nurses station stating that the patient was ready to go upstairs. RN promptly at bedside along with Alec GOMES RN. Pt's mother had taken pt to restroom and back to wheelchair without RN's knowledge. While pt in the wheelchair, RN and Alec GOMES RN requested to perform a fundal check on pt as fundal check due, pt refused. RN positioned patient, with assistance, to take epidural out. RN taken to mother/baby via wheelchair accompanied by pt's mother.

## 2024-11-18 NOTE — NURSING NOTE
"Upon transfer to MB unit RN entered with LD RN. Introduced to pt and explained we needed to do a physical assessment. Pt appeared very agitated,refused to get out of wheelchair to be assessed Pt stated, \"I need everyone to get out of the room\" Both RNs left room.  "

## 2024-11-18 NOTE — NURSING NOTE
"RN took pt up to mother/baby via wheelchair accompanied by pt's mother. Upon arriving to mother/baby, pt began to panic stating that she was embarrassed and did not want a new nurse. Pt stated that being around multiple people causes her to panic. RN educated pt and pt's mother on the process of transferring to mother/baby and that she would be receiving a new nurse. Pt also educated on the importance of obtaining vital signs and performing fundal checks. Pt refused. RN and Africa WASSERMAN at bedside to do bedside report. Pt began to panic and scream \"leave, I said no, leave\". Both RN's stepped out per pt request. Pt's mother was at the bedside with pt. RN's attempted to come back, pt refused. Mother/baby charge Tiara WASSERMAN notified. Pt's mother transferred pt to bed. Pt's mother stepped out of pt's room to nurses station stating that she is pt's guardian and if there is any questions or concerns to speak to her. Africa WASSERMAN to assume all care.   "

## 2024-11-18 NOTE — H&P
[facility]  Obstetric History and Physical    Chief Complaint   Patient presents with    Contractions       Subjective     Patient is a 29 y.o. female  currently at 40w0d, who presents with labor at term.  Patient has had issues with mental health with prior suicidal attempts.  Patient has been doing well however..    Her prenatal care is benign.  Her previous obstetric/gynecological history is noted for is non-contributory.    The following portions of the patients history were reviewed and updated as appropriate: current medications .       Prenatal Information:  Prenatal Results       Initial Prenatal Labs       Test Value Reference Range Date Time    Hemoglobin  12.1 g/dL 11.1 - 15.9 24 1608    Hematocrit  35.7 % 34.0 - 46.6 24 1608    Platelets  151 x10E3/uL 150 - 450 24 1608    Rubella IgG  1.11 index Immune >0.99 24 1608    Hepatitis B SAg  Negative  Negative 24 1608    Hepatitis C Ab  Non Reactive  Non Reactive 24 1608    RPR  Non Reactive  Non Reactive 24 1122       Non Reactive  Non Reactive 24 1608    T. Pallidum Ab   Non-Reactive  Non-Reactive 24 0254    ABO  O   24 0754    Rh  Positive   24 0754    Antibody Screen  Negative  Negative 24 1608    HIV  Non Reactive  Non Reactive 24 1608    Urine Culture  Final report   24        Final report   24 1608    Gonorrhea        Chlamydia        TSH        HgB A1c         Varicella IgG        Hemoglobinopathy Fractionation        Hemoglobinopathy (genetic testing)        Cystic fibrosis         Spinal muscular atrophy        Fragile X                  Fetal testing        Test Value Reference Range Date Time    NIPT        MSAFP        AFP-4                  2nd and 3rd Trimester       Test Value Reference Range Date Time    Hemoglobin (repeated)  12.1 g/dL 12.0 - 15.9 24 0254       10.2 g/dL 12.0 - 15.9 24 1122    Hematocrit (repeated)  35.1 % 34.0 -  46.6 11/18/24 0254       29.6 % 34.0 - 46.6 08/29/24 1122    Platelets   149 10*3/mm3 140 - 450 11/18/24 0254       146 10*3/mm3 140 - 450 08/29/24 1122       151 x10E3/uL 150 - 450 04/04/24 1608    1 hour GTT   108 mg/dL 65 - 139 08/29/24 1122    Antibody Screen (repeated)  Negative   11/18/24 0251       Negative  Negative 08/29/24 1122    3rd TM syphilis scrn (repeated)  RPR   Non Reactive  Non Reactive 08/29/24 1122    3rd TM syphilis scrn (repeated) TP-Ab  Non-Reactive  Non-Reactive 11/18/24 0254    3rd TM syphilis screen TB-Ab (FTA)  Non-Reactive  Non-Reactive 11/18/24 0254    Syphilis cascade test TP-Ab (EIA)        Syphilis cascade TPPA        GTT Fasting        GTT 1 Hr        GTT 2 Hr        GTT 3 Hr        Group B Strep  No Group B Streptococcus isolated   10/25/24 1822              Other testing        Test Value Reference Range Date Time    Parvo IgG         CMV IgG                   Drug Screening       Test Value Reference Range Date Time    Amphetamine Screen  Negative ng/mL Ynqxkf=0190 04/04/24 1608    Barbiturate Screen  Negative ng/mL Lliaxy=944 04/04/24 1608    Benzodiazepine Screen  Negative ng/mL Omoqqe=944 04/04/24 1608    Methadone Screen  Negative ng/mL Wlnvbh=530 04/04/24 1608    Phencyclidine Screen  Negative ng/mL Cutoff=25 04/04/24 1608    Opiates Screen  Negative ng/mL Xfzpeu=561 04/04/24 1608    THC Screen  Negative ng/mL Cutoff=20 04/04/24 1608    Cocaine Screen  Negative ng/mL Hpwknh=334 04/04/24 1608    Propoxyphene Screen  Negative ng/mL Yyvvkd=689 04/04/24 1608    Buprenorphine Screen        Methamphetamine Screen        Oxycodone Screen        Tricyclic Antidepressants Screen                  Legend    ^: Historical                          External Prenatal Results       Pregnancy Outside Results - Transcribed From Office Records - See Scanned Records For Details       Test Value Date Time    ABO  O  11/18/24 0754    Rh  Positive  11/18/24 0754    Antibody Screen  Negative   24 0251       Negative  24 1122       Negative  24 1608    Varicella IgG       Rubella  1.11 index 24 1608    Hgb  12.1 g/dL 24 0254       10.2 g/dL 24 1122       12.1 g/dL 24 1608    Hct  35.1 % 24 0254       29.6 % 24 1122       35.7 % 24 1608    HgB A1c        1h GTT  108 mg/dL 24 1122    3h GTT Fasting       3h GTT 1 hour       3h GTT 2 hour       3h GTT 3 hour        Gonorrhea (discrete)       Chlamydia (discrete)       RPR  Non Reactive  24 1122       Non Reactive  24 1608    Syphils cascade: TP-Ab (FTA)  Non-Reactive  24 0254    TP-Ab  Non-Reactive  24 0254    TP-Ab (EIA)       TPPA       HBsAg  Negative  24 1608    Herpes Simplex Virus PCR       Herpes Simplex VIrus Culture       HIV  Non Reactive  24 1608    Hep C RNA Quant PCR       Hep C Antibody  Non Reactive  24 1608    AFP       NIPT       Cystic Fibrosis (Dashawn)       Cystic Fibroisis        Spinal Muscular atrophy       Fragile X       Group B Strep  No Group B Streptococcus isolated  10/25/24 1822    GBS Susceptibility to Clindamycin       GBS Susceptibility to Erythromycin       Fetal Fibronectin       Genetic Testing, Maternal Blood                 Drug Screening       Test Value Date Time    Urine Drug Screen       Amphetamine Screen  Negative ng/mL 24 1608    Barbiturate Screen  Negative ng/mL 24 1608    Benzodiazepine Screen  Negative ng/mL 24 1608    Methadone Screen  Negative ng/mL 24 1608    Phencyclidine Screen  Negative ng/mL 24 1608    Opiates Screen       THC Screen       Cocaine Screen       Propoxyphene Screen  Negative ng/mL 24 1608    Buprenorphine Screen       Methamphetamine Screen       Oxycodone Screen       Tricyclic Antidepressants Screen                 Legend    ^: Historical                             Past OB History:     OB History    Para Term  AB Living   2 1 1 0 1  1   SAB IAB Ectopic Molar Multiple Live Births   1 0 0 0 0 1      # Outcome Date GA Lbr Reji/2nd Weight Sex Type Anes PTL Lv   2 Term 24 40w0d 09:32  01:11 2880 g (6 lb 5.6 oz) F Vag-Spont EPI N ABE      Name: Kimberlyn Grimm      Apgar1: 8  Apgar5: 9   1 SAB 10/09/23 9w0d    SAB          Past Medical History: Past Medical History:   Diagnosis Date    Anxiety     Depression     Eating disorder     History of drug overdose 2021    intentional, with ibuprofen    History of suicidal ideation 2022    with paranoia    Psychosis     SAB (spontaneous )       Past Surgical History Past Surgical History:   Procedure Laterality Date    D & C WITH SUCTION  10/09/23    DILATATION AND EVACUATION  10/2023    MULTIPLE TOOTH EXTRACTIONS  2022    impacted teeth numbers 1, 17,and 32.; Surgical extraction soft tissue impaction # 16    WISDOM TOOTH EXTRACTION  Not sure      Family History: Family History   Problem Relation Age of Onset    Anxiety disorder Mother     Depression Mother     Anxiety disorder Father     Depression Father     Cancer Maternal Grandfather     Alcohol abuse Maternal Grandmother     Anxiety disorder Sister     Depression Sister         half-sister    Depression Paternal Aunt     Breast cancer Neg Hx     Ovarian cancer Neg Hx     Uterine cancer Neg Hx     Colon cancer Neg Hx       Social History:  reports that she has never smoked. She has never used smokeless tobacco.   reports that she does not currently use alcohol.   reports no history of drug use.        General ROS: Pertinent items are noted in HPI    Objective       Vital Signs Range for the last 24 hours  Temperature: Temp:  [97.6 °F (36.4 °C)-97.7 °F (36.5 °C)] 97.6 °F (36.4 °C)   Temp Source: Temp src: Axillary   BP: BP: ()/(57-94) 111/79   Pulse: Heart Rate:  [57-97] 61   Respirations: Resp:  [15-18] 15   SPO2: SpO2:  [95 %-97 %] 95 %   O2 Amount (l/min):     O2 Devices Device (Oxygen Therapy): room air   Weight:  "      Physical Examination: General appearance - alert, well appearing, and in no distress    Presentation: Vertex   Cervix: Exam by:     Dilation:     Effacement:     Station:         Fetal Heart Rate Assessment   Method:     Beats/min:     Baseline:     Varibility:     Accels:     Decels:     Tracing Category:       Uterine Assessment   Method:     Frequency (min):     Ctx Count in 10 min:     Duration:     Intensity:     Intensity by IUPC:     Resting Tone:     Resting Tone by IUPC:     [place] Units:       Laboratory Results:   Radiology Review: Other Studies:     Assessment & Plan       Normal labor    Normal labor    40 weeks gestation of pregnancy        Assessment:  1.  Intrauterine pregnancy at 40w0d weeks gestation with reactive fetal status.    2.  Anticipate vaginal birth.  3.  Obstetrical history significant for is non-contributory.  4.  GBS status: No results found for: \"GBSANTIGEN\"    Plan:  1.  Anticipate vaginal birth  2. Plan of care has been reviewed with patient and family  3.  Risks, benefits of treatment plan have been discussed.  4.  All questions have been answered.  5.        Rosmery Ramirez MD  11/18/2024  11:26 EST  "

## 2024-11-18 NOTE — ANESTHESIA PREPROCEDURE EVALUATION
Anesthesia Evaluation     Patient summary reviewed and Nursing notes reviewed                Airway   Mallampati: I  TM distance: >3 FB  Neck ROM: full  No difficulty expected  Dental      Pulmonary - negative pulmonary ROS   Cardiovascular - negative cardio ROS        Neuro/Psych- negative ROS  (+) psychiatric history Anxiety and Depression    ROS Comment: H/O anorexia  GI/Hepatic/Renal/Endo - negative ROS     Musculoskeletal (-) negative ROS    Abdominal    Substance History - negative use     OB/GYN negative ob/gyn ROS   (+) Pregnant        Other                    Anesthesia Plan    ASA 2     epidural       Anesthetic plan, risks, benefits, and alternatives have been provided, discussed and informed consent has been obtained with: patient.    Use of blood products discussed with patient .      CODE STATUS:    Level Of Support Discussed With: Patient  Code Status (Patient has no pulse and is not breathing): CPR (Attempt to Resuscitate)  Medical Interventions (Patient has pulse or is breathing): Full Support

## 2024-11-18 NOTE — NURSING NOTE
Pt mother returned to room, came to the desk and stated she had talked pt into letting staff do her care.  I went to room asked pt if I could do her assessment and vital signs, she grunted, and the pts mom stated she was saying yes.  I did pt assessment and checked vitals at this time. FU/1, no active bleeding at this time, put pads, panties and ice pack on patient. Pt did not speak or answer questions I asked during this time.  I asked if pt felt the urge to void, no response from pt was given.      Time:1530  Pt mother asked if baby was warm enough to come to the room to breastfeed.  I told her I would check with the nursery.  Spoke with charge nurse Tiara and Darlene Director about concerns of mom's behavior and safety of baby being in the room, they both stated as long as there was supervision she could go out to room.  [facility] to ensure a support person was in the room when baby was in the room at all times to ensure mom was able to care for herself and baby.  Baby brought to the room at this time.  Went over bulb syringe and baby safety with pt and her mother.  Pt didn't respond, I asked pt if she was planning on breastfeeding, she did not respond, pts mother said she was going to breastfeed and that she would help her.  Pts mom, repeatedly asked pt if she was ready to breastfeed and she did not respond back.  Mom refused to ambulate to the bathroom at this time.  Pts mother stated she voided before coming to mother baby.

## 2024-11-18 NOTE — ANESTHESIA PROCEDURE NOTES
Labor Epidural      Patient reassessed immediately prior to procedure    Patient location during procedure: OB  Performed By  Anesthesiologist: Galdino Wright DO  Preanesthetic Checklist  Completed: patient identified, IV checked, site marked, risks and benefits discussed, surgical consent, monitors and equipment checked, pre-op evaluation and timeout performed  Prep:  Pt Position:sitting  Sterile Tech:gloves, mask, sterile barrier and cap  Prep:chlorhexidine gluconate and isopropyl alcohol  Monitoring:blood pressure monitoring and continuous pulse oximetry  Epidural Block Procedure:  Approach:midline  Guidance:landmark technique and palpation technique  Location:L3-L4  Needle Type:Tuohy  Needle Gauge:17 G  Loss of Resistance Medium: air  Loss of Resistance: 4cm  Cath Depth at skin:10 cm  Paresthesia: none  Aspiration:negative  Test Dose:negative  Number of Attempts: 1  Post Assessment:  Dressing:secured with tape and occlusive dressing applied (Tegaderm Placed)  Pt Tolerance:patient tolerated the procedure well with no apparent complications  Complications:no

## 2024-11-19 ENCOUNTER — PATIENT OUTREACH (OUTPATIENT)
Dept: LABOR AND DELIVERY | Facility: HOSPITAL | Age: 29
End: 2024-11-19
Payer: MEDICAID

## 2024-11-19 VITALS
RESPIRATION RATE: 20 BRPM | SYSTOLIC BLOOD PRESSURE: 110 MMHG | BODY MASS INDEX: 22.85 KG/M2 | HEART RATE: 83 BPM | HEIGHT: 66 IN | TEMPERATURE: 98.2 F | OXYGEN SATURATION: 95 % | DIASTOLIC BLOOD PRESSURE: 76 MMHG

## 2024-11-19 LAB
AMPHET+METHAMPHET UR QL: NEGATIVE
AMPHETAMINES UR QL: NEGATIVE
BARBITURATES UR QL SCN: NEGATIVE
BENZODIAZ UR QL SCN: NEGATIVE
BUPRENORPHINE SERPL-MCNC: NEGATIVE NG/ML
CANNABINOIDS SERPL QL: NEGATIVE
COCAINE UR QL: NEGATIVE
FENTANYL UR-MCNC: NEGATIVE NG/ML
METHADONE UR QL SCN: NEGATIVE
OPIATES UR QL: NEGATIVE
OXYCODONE UR QL SCN: NEGATIVE
PCP UR QL SCN: NEGATIVE
TRICYCLICS UR QL SCN: NEGATIVE

## 2024-11-19 RX ADMIN — SERTRALINE HYDROCHLORIDE 50 MG: 25 TABLET ORAL at 01:04

## 2024-11-19 RX ADMIN — SERTRALINE HYDROCHLORIDE 25 MG: 25 TABLET ORAL at 01:04

## 2024-11-19 RX ADMIN — IBUPROFEN 600 MG: 600 TABLET, FILM COATED ORAL at 01:04

## 2024-11-19 RX ADMIN — Medication 5 MG: at 02:31

## 2024-11-19 NOTE — SIGNIFICANT NOTE
"Pt. Refuses lab work today. Appears frustrated and slightly agitated. Pts father was asking her a question and she stated, \"Evidently today, I'm repeating everything they say on TV\".   "

## 2024-11-19 NOTE — SIGNIFICANT NOTE
"At 2211, primary nurse called charge nurse for assistance in the patient's room. Upon arrival to the room, the patient was standing at the end of the bed. Patient belongings in a wheelchair in the room. All of the sheets had been stripped from the bed and the pillows (without pillowcases) were laying on the floor. Primary nurse introduced charge nurse and attempted to initiate conversation with patient, but with no response. Patient appeared to be in a somewhat catatonic state, providing occasional glances at staff, but very minimal interaction other than the occasional grunt.    Charge nurse and primary nurse remade the patient's bed. Patient sat in a chair in the corner of the room. Charge nurse sat beside patient and, once again, attempted to initiate conversation with patient. Patient stared off and would not respond. Asked patient multiple times if she was okay and if we could get her anything to help her be more comfortable. No response other than an occasional grunt. Asked the patient if we could call her mother for her, and she stated that \"her mom would be mad at her.\" Offered to call her significant other and received no response. Offered to bring her baby into the room and sit with her while she held and fed the baby and she did not answer. Asked patient if she would like me to leave and she grunted \"mmhmm\".     Left patient's room and called the house supervisor. Updated her regarding the patient's situation. Loud noise heard from near patient's room, so primary nurse and charge nurse went to check on patient. Patient standing at the side of the bed. Stated she \"needed to get out\". Charge nurse offered to walk around with patient. Patient entered the hallway and stood outside of her room. Patient said she \"wanted to see Melina\". Offered to get baby from the nursery and bring the baby into the room and let her visit with the baby. Patient walked back into her room and did not say anything to the nurse " "about getting baby from the nursery.     Charge nurse followed patient back into the room. Patient sat in the chair, but quickly got up and said \"This... is ... Embarrassing. I'm so embarrassed.\" When asked what was embarrassing to her, she stated \"This is all so ridiculous, just ridiculous\" and walked back out of the room. Patient walked down the hallway towards the patio. Charge nurse followed patient down the hallway. Patient stood outside of the family restroom staring at the door for several minutes. Patient then turned and looked at the patio, but never walked toward the patio door. She then walked toward the stairway door and said she \"needed to go home\". I explained to her that the doorway was locked and once she was in the stairway, she would not be able to get back out of the stairway due to the locked doors. Patient proceeded to enter the stairway. Charge nurse followed closely behind.     Patient walked down several stairs and paused at the middle of the stairway and kept looking down the four flights of stairs. I asked the patient if she was okay and how she was feeling. No response. Just stared at me and proceeded down the stairs. Went down a couple of flights of stairs before pausing at floor two. I asked the patient again if there was anything I could do to help her and encouraged her to come back to the fourth floor with me. She refused and proceeded down to the first floor. When patient got to the door to exit, she realized the door was locked. I attempted to scan my badge to exit the door and was unable access the door. Primary nurse and another nurse from the unit came to the stairwell on the fourth floor and asked if we were doing okay. Explained that we were locked inside the stairwell and would need to either come back to the fourth floor or have someone let us out from the other side of the door. The patient looked back and forth between the basement stairs (with no exit) and the fire exit " "door. Charge nurse spoke to house supervisor briefly and she states she will let us out on the other side.    House supervisor, escorted by security, opened the door from the other side. Patient followed the  through the cancer center to the main lobby, followed by charge nurse and house supervisor. When in the main lobby, asked the patient to come with us back to the fourth floor. Primary nurse joined staff in the lobby. Patient said \"I need to go home\" and exited out the doors. Followed patient down the sidewalk towards the [facility] entrance. Several attempts were made to have the patient return to the building. Patient then turned and walked into the parking lot. She stood in the middle of the parking lot with house supervisor, charge nurse, primary nurse, and  surrounding her. Explained to patient that we just wanted to do what was best for her and to get her help, but we couldn't do that unless she went back upstairs. Explained to patient that she could leave and go home, but we needed her to go back inside until we could get her a ride and get her into warmer clothes (patient was in a robe and socks). Asked patient if she still had an IV, since patient would not let staff assess her, but she said no. Asked patient if she took her IV out and she said yes.     In the meantime, the [facility] charge nurse called this charge nurse and asked if there was anything we needed for her to do. She spoke to Dr. Alejandra to see if he could come assess the patient. Dr. Alejandra instructed her to call Dr. Condon who was on call for patient's physician, Dr. Ramirez. [facility] charge nurse informed this nurse that Dr. Condon wanted to send a urine drug screen and possibly do a psych consult. [facility] charge nurse also called patient's mother x 2 with no answer. She then called the patient's significant other who said he would try to make it back to the hospital and see if he could help calm the " "patient.     Patient still standing in the middle of the parking lot, pacing back and forth. Primary nurse encouraged patient to come back up to the floor and that her baby needed her. Patient silent and gave no reaction to the mention of her baby. Charge nurse asked patient if she had any intention of harming herself and she said \"I just want to go home.\". At this time, two additional security guards came to assist. Female  spoke with patient. All staff encouraged patient to come back upstairs to no avail. After approximately 30-45 minutes, the patient finally agreed to walk with the female  to the lobby.     Once in the lobby, the patient seemed unsure of whether or not she wanted to return to the first floor. All staff offered to let her call her significant other or her mother and talk to them. Patient did not give any response. After conversation with , patient agreed to return to her room. Per , patient did speak to her, and kept apologizing and saying \"I'm so mad at myself... I hate myself.\"     House supervisor, charge nurse, and two security guards remained in fourth floor lobby. At this time, patient's significant other, Greg, arrived on the floor. Spoke to patient's significant other for several minutes. Significant other informed us that the patient does have a history of being institutionalized \"a couple years ago\" and that she has been diagnosed with schizo-affective disorder. He states that since they have been together, he has not seen this specific behavior, but that the patient does get overwhelmed quite easily and \"shuts down\". It is his understanding that this behavior was how she acted before being institutionalized last time. Reiterated that he had not seen any behavior of this kind, and he said that only once had the patient experienced what he called \"auditory delusions\" where the patient thought she was hearing someone telling " "her to \"get out of her sister's bed\", but they just laughed it off later on. Charge nurse asked Greg if he was aware of any guardianship that the patient's mother has over the patient and he said he knew at one point there was, but he was unsure of the status of that at this time.     Staff walked with Greg to the patient's room. Patient was lying in the bed facing the wall when significant other entered the room. Security and house supervisor remained on the unit for several minutes. Primary nurse entered the room, where patient agreed to take previously refused medications and let nurse assess her. Assessment and vitals within normal limits at this time.           "

## 2024-11-19 NOTE — SIGNIFICANT NOTE
"I was introduced at 1915 from [facility] RN. Pt. Didn't speak, she was holding the baby. FOB was in room, pt. Non verbal at this time. FOB said he was leaving and so baby will have to go to the nursery tonight. Both gave verbal consent for Donor breastmilk but only FOB signed it. I explained    since they weren't  she needed to sign all forms. When I asked pt. If I could assess her she grunted and shook her head no. I told her I would come back later. Went back at 2130 to ask to get vitals and assess, also discussed with her in detail why she needed to sign the papers instead of FOB. Pt. Became upset and said no. I told her if baby was going to be in the nursery and she wanted us to use donor milk to sign next to FOB signature.  She refused and stated, \"She has a Mom, and a Dad, she doesn't need food, she'll be fine.\" All papers left with Mom, enc. Her to rest and we would take care of it tomorrow.  "

## 2024-11-19 NOTE — SIGNIFICANT NOTE
"Pt declined all physical assessment. She states she has been worried about her bleeding, I asked her if she thought her bleeding was about like a period or maybe more. She did not answer. I said that it should be like a slightly heavy period but not more and that there may be clots. Pt stated that she had, \"had the clot part already\" Instructed her to inform nurse if she has more. Encouraged pt to let me know if she has pains or concerns.  "

## 2024-11-19 NOTE — LACTATION NOTE
"PM Charge nurse requested lactation visit with mom as she has stated that she is ready to feed Melina.  Lactation was in room with patient from 6:55 to 8:50 am.  Throughout visit, patient was startled by all the noises occurring around her.  Even very light noises seemed to illicit a strong startle.    During Lactation visit in room:    Patient was sitting on bedside when Lactation entered the room.  Patient's father was holding the baby and standing in front of mother.  Lactation introduced self and asked if the patient would like assistance with breastfeeding.  Patient's father answered that she was just about to feed Melina.  Patient stood up.  Lactation asked her if she would like assistance with breastfeeding.  Patient states that she has put infant to breast and it has not gone well.  Encouraged patient that many moms feel like that at first and it is normal for feeding to not go great in the beginning.  Offered assistance again.  Mom states she would like assistance.      Mom is deep breathing and appears overwhelmed.  Discussed the option to pump if that would seem less stressful.  Or to give the baby a bottle and attempt at breast later if she desired.  The patient seems to  have trouble deciding and appears overwhelmed.  Patient states \"I would like to feed Melina and I would like to have a shower.\"  Lactation suggested we try breastfeeding for 10 minutes and then mom can have a shower.  After several minutes, mom verbalizes that she does wish to breastfeed now and will shower in 10 minutes.      Asked patient where she would like to sit for nursing.  Patient states she would like to sit in the chair.  Assisted patient's father with unswaddling infant.  Father passed infant off to patient and stated he would wait in the hallway and come back when she was ready. Patient holding infant in cradle hold.  Lactation asked patient if it would be okay to touch infant and mom while helping.  Patient gave consent " "for lactation to physically help.    Lactation asked if infant could be transferred so that mom could prepare her shirt/breast for nursing.  Infant given to lactation.  Instructed mom to raise shirt so infant could be positioned for latch.  Mom raised shirt but still had tank on underneath.  Mom states \"No.\"  Lactation asked what mom is saying no to.  Mom states \"No.\" Again.  Lactation asked patient if she desires to wait on breastfeeding and given infant a bottle for now.  Patient is struggling to answer and appears frustrated/overwhelmed, is deep breathing.  Patient verbalizes that \"I am worried I am failing at this.\"   Lactation attempted to console mother and explain that her feelings are normal.  Explained that the staff at the hospital is here to assist her feel prepared to care for her infant at home and she just has to tell us how we can help her.  Patient stared up at lactation for a couple minutes without speaking.  Patient looked at bracelets on baby and asked what they were.  Lactation explained bracelets to mom.  Mom asked about her own bracelets and Lactation explained those as well.   Lactation asked patient if she would like to take a shower now.  After a couple minutes of waiting in silence, patient states \"I would like to give Melina a bottle and shower.\"    Infant swaddled in crib.  No formula in crib drawers.  Explained to mom that the nurse would need to be called for a bottle.  Mom started deep breathing again and looked panicky.  Lactation asked mom if she would prefer the nurse not be called.  The patient states yes.  A bottle was retrieved from the diaper gift bag at the bottom of the crib.  Bottle and nipple handed to mom.  Lactation asked if she would like to feed Melina or if she would like lactation to feed her.  Patient states she would like to feed.  Patient acts unsure as what to do with bottle and nipple.  Lactation demonstrated and explained opening the nipple package and bottle " "and putting together.  Bottle handed back to mom.  Patient moved toward infant to feed bottle while infant laying flat in crib.  Instructed mom that infants cannot be fed while laying flat on their back.  Melina needs to be held while feeding.  Bottle taken from patient.  Patient picked up infant after several deep breaths and hesitating movements.  She held infant well with good support and took the bottle back.  Patient introduced bottle to infant and waited for open mouth.  Assisted her with inserting bottle into Melina's mouth and spoke encouraging words at how well they both were doing.  Melina sucked and ate appropriately for a couple swigs and then started gagging and spitting formula out.  Bottle removed and assisted mom with wiping infant's mouth.  Mom states Melina is done eating.  Offered to attempt to feed her more since she only had a few sucks.  Mom declined and layed Melina back in crib.      Patient appears unsettled and upset, deep breathing.  Asked patient if she would like to shower now.  Patient states yes but did not know where she could shower.  Bathroom shown to patient.  Towels placed at next to shower.  Asked patient if she brought shampoo or body wash.  Patient stared blankly.  Offered to get her some from floor stock.   Asked mom if she would like assistance with laying clothes out in the bathroom.  Patient states yes.  Patient began pulling clothes from bag but quickly became frustrated and began deep breathing.  Lactation offered to gather clothes for her but mom never states ok.  Lactation waited for mom to tell her what she needed.  Patient states, \"I would like to go home with my family.  I would like to shower and then go home with my family.\"  Lactation states that she can communicate that with the nurse.  Lactation asked patient if she would like to take the infant home with her too.  Patient stared at crib with no answer.  Nurse brought into room to introduce next shift.  " "Patient's father came back in room.    With father's encouragement, patient stated that she would like to take a shower and then eat breakfast.  She had a very hard time moving in one direction despite verbalizing her desire.  Both dad and lactation offered assistance with shower.  Patient could not verbalize what she needed, several minutes of staring/non-responsive.  Patient states \"I feel like I am losing my mind.  I feel like I am losing my mind.\"  Father states \"You haven't slept recently, that doesn't help.\"  Patient stated again \"I feel like I am losing my mind.\"  Lactation asked \"What are you feeling that makes you say that?\"  Patient places hands on head and shakes her head back and forth with no response.  Patient turned to father and lactation and said \"I am Kingsley.  I am Kingsley.  She is Melina.  She is Melina.  I am Kingsley.\"  Father states \"Yes.  That is right.\"        Breakfast tray was brought in and set up at bedside.  Patient's father encouraged patient to eat while her food was hot.  States he will sit beside her while she eats.  Lactation asked mom if she would like infant left in room or taken to the nursery while she eats.  Patient began deep breathing and staring at lactation nurse with no response.  Patient states \"I would like Melina to stay in the room.\"   And \"I would like us all to stay in the room and talk.\"   Dad sat next to patient on the bed and encouraged her to eat.  Patient stabbed at eggs but did not take a bite.  Patient turned to father and stated \"I am Kingsley.  I am Kingsley.  I am Kingsley.\"  Father states \"Yes you are.  You are Kingsley.\"  He continued to encourage her to eat.  Kingsley also stated during attempts to eat \"I'm not fine.  This is not fine.  I am not fine.\"  Father asked patient if she would like to speak with a counselor or anyone while she was at the hospital.  Patient did  not respond.    Lactation stood with infant at crib holding pacifier in infant's mouth to keep " "her content.  Infant began to squirm and lactation went to pick infant up from crib, mom startled at this response and started at lactation.  Lactation backed off picking up infant and continued to attempt to sooth infant while leaving her in the crib.  Patient's father walked toward crib to  infant but patient startled and verbalized \"No!\" strongly.  Father stopped coming toward infant and went back to patient's side.    Nurse came in and asked patient's father to step into the hallway.  Patient left food (1 bite had been taken) and came to infant's bedside.  She stood next to crib looking at infant and stated \"I don't want to fight with Christopher.\"  Lactation asked, \"What are you worried about fighting over?\"  Patient did answer but continued to stare at infant.  Patient took a couple deep breaths and then picked up infant from crib.  She held her with control and support, looking at the bottle.  Patient's father came back in room.  Father states \"Isn't she beautiful?\"  Patient staring with a small smile at infant.  Asked patient if she would like to sit down while holding infant.  Patient stared back at lactation with  no response.  After a minute of waiting, patient's father says \"why don't you sit down in that chair.\"  Patient continued to stare and not answer or move.  Lactation tried again saying, \"Do you want to sit down on the bed or the chair so you can eat while you hold Melina?  Or your dad can hold her next to you  while you eat?\"  Patient stated yes and father ushered her toward the chair.  Patient requested to give the baby a bottle.  She held both the baby and the bottle and stared down at the baby, making no more attempts to move or feed the infant.  Patient's father asked if he could hold Melina.  Patient handed baby over to father.  Her father sat down in chair and patient picked up tray and carried over to chair next to her father.  Lactation pushed tray table over to mom and set her " "tray up on the table.  The patient thanked lactation.  Patient stared at lactation's name darshan and said \"Lactation consultant.  Lactation consultant.\"  Lactation replied, \"Yes.  I am a lactation consultant.  I help with breastfeeding.  I am also a nurse and help with moms and babies.\"  The patient stared at lactation for a minute and then stated, \"I am sorry to have wasted your time this morning.\"  Lactation replied \"You did not waste my time.  You have nothing to apologize for.  I am a nurse and love helping  moms and babies.  It was a pleasure to get to meet you and your sweet one.\"  The patient looked at lactation and replied, \"She is not a doll.  She is not fake.  She is a person.  She is a person.  She is a person.\"  Lactation replied, \"You are right.  She is a person.\"  Lactation encouraged mom to call for assistance if needed during her stay and left the room.    "

## 2024-11-19 NOTE — OUTREACH NOTE
[facility]  Unable to Reach    Questions/Answers      Flowsheet Row Responses   Pending Outreach Postpartum IP   Call Attempt First   Outcome Not available   Unable to reach comments: Please see [facility]-UTR note dated today.          Following review of staff notes since delivery and discussion with [facility] NELSON David it was determined to prevent increased agitation or escalation of negative behaviors that the scheduled Inpatient PP visit with [facility] should be suspended.   Will plan PP check in at 7-14 days post discharge if patient available at that time.     Kalee Finley RN  Maternity Nurse Navigator    11/19/2024, 13:22 EST

## 2024-11-19 NOTE — ANESTHESIA POSTPROCEDURE EVALUATION
Patient: Kingsley Grimm    Procedure Summary       Date: 11/18/24 Room / Location:     Anesthesia Start: 0626 Anesthesia Stop: 1019    Procedure: LABOR ANALGESIA Diagnosis:     Scheduled Providers:  Provider: Ana Shafer DO    Anesthesia Type: epidural ASA Status: 2            Anesthesia Type: epidural    Vitals  Vitals Value Taken Time   /74 11/19/24 0106   Temp 98 °F (36.7 °C) 11/19/24 0106   Pulse 60 11/19/24 0106   Resp 20 11/19/24 0106   SpO2 95 % 11/18/24 0638           Post Anesthesia Care and Evaluation    Patient location during evaluation: bedside  Patient participation: complete - patient participated  Level of consciousness: awake and alert  Pain management: adequate    Airway patency: patent  Anesthetic complications: No anesthetic complications    Cardiovascular status: acceptable  Respiratory status: acceptable  Hydration status: acceptable  Post Neuraxial Block status: Motor and sensory function returned to baseline and No signs or symptoms of PDPH

## 2024-11-19 NOTE — PROGRESS NOTES
Postpartum Progress Note    Patient name: Kingsley Grimm  YOB: 1995   MRN: [id number]  Referring Provider: Rosmery Ramirez MD  Admission Date: 2024  Date of Service: 2024    ID: 29 y.o.     Diagnosis:   S/p vaginal delivery     Normal labor    Normal labor    40 weeks gestation of pregnancy       Subjective:      No complaints.  Moderate lochia.  Ambulating, voiding, tolerating diet.  Pain well controlled.  The patient is currently breastfeeding.   This baby is a female.    Objective:      Vital signs:  Vital Signs Range for the last 24 hours  Temperature: Temp:  [97.6 °F (36.4 °C)-98.5 °F (36.9 °C)] 98 °F (36.7 °C)   Temp Source: Temp src: Oral   BP: BP: (103-131)/(64-86) 104/74   Pulse: Heart Rate:  [60-80] 60   Respirations: Resp:  [18-20] 20   Weight:       General: Alert, catatonic state, In no apparent distress  Abdomen: soft, nontender  Uterus: firm, nontender  Extremities: nontender; no edema      Labs:  Lab Results   Component Value Date    WBC 9.49 2024    HGB 12.1 2024    HCT 35.1 2024    MCV 90.0 2024     2024     Results from last 7 days   Lab Units 24  0754   ABO TYPING  O   RH TYPING  Positive     External Prenatal Results       Pregnancy Outside Results - Transcribed From Office Records - See Scanned Records For Details       Test Value Date Time    ABO  O  24 0754    Rh  Positive  24 0754    Antibody Screen  Negative  24 0251       Negative  24 1122       Negative  24 1608    Varicella IgG       Rubella  1.11 index 24 1608    Hgb  12.1 g/dL 24 0254       10.2 g/dL 24 1122       12.1 g/dL 24 1608    Hct  35.1 % 24 0254       29.6 % 24 1122       35.7 % 24 1608    HgB A1c        1h GTT  108 mg/dL 24 1122    3h GTT Fasting       3h GTT 1 hour       3h GTT 2 hour       3h GTT 3 hour        Gonorrhea (discrete)       Chlamydia (discrete)       RPR   Non Reactive  08/29/24 1122       Non Reactive  04/04/24 1608    Syphils cascade: TP-Ab (FTA)  Non-Reactive  11/18/24 0254    TP-Ab  Non-Reactive  11/18/24 0254    TP-Ab (EIA)       TPPA       HBsAg  Negative  04/04/24 1608    Herpes Simplex Virus PCR       Herpes Simplex VIrus Culture       HIV  Non Reactive  04/04/24 1608    Hep C RNA Quant PCR       Hep C Antibody  Non Reactive  04/04/24 1608    AFP       NIPT       Cystic Fibrosis ([place])       Cystic Fibroisis        Spinal Muscular atrophy       Fragile X       Group B Strep  No Group B Streptococcus isolated  10/25/24 1822    GBS Susceptibility to Clindamycin       GBS Susceptibility to Erythromycin       Fetal Fibronectin       Genetic Testing, Maternal Blood                 Drug Screening       Test Value Date Time    Urine Drug Screen       Amphetamine Screen  Negative  11/18/24 2300       Negative ng/mL 04/04/24 1608    Barbiturate Screen  Negative  11/18/24 2300       Negative ng/mL 04/04/24 1608    Benzodiazepine Screen  Negative  11/18/24 2300       Negative ng/mL 04/04/24 1608    Methadone Screen  Negative  11/18/24 2300       Negative ng/mL 04/04/24 1608    Phencyclidine Screen  Negative  11/18/24 2300       Negative ng/mL 04/04/24 1608    Opiates Screen  Negative  11/18/24 2300    THC Screen  Negative  11/18/24 2300    Cocaine Screen       Propoxyphene Screen  Negative ng/mL 04/04/24 1608    Buprenorphine Screen  Negative  11/18/24 2300    Methamphetamine Screen       Oxycodone Screen  Negative  11/18/24 2300    Tricyclic Antidepressants Screen  Negative  11/18/24 2300              Legend    ^: Historical                            Assessment/Plan:      PPD#1 s/p vaginal delivery.   Declined vital check, assessment, and labs.   Catatonic state. History of Psychosis, Schizoaffective disorder, suicidal ideation, anxiety, drug overdose (intentional), and depression. Case management/ consulted and following patient.     Plan:   Continue  routine postpartum care.  Advance.   Case management/ will continue to follow.     Flores Chacko, APRN  11/19/24

## 2024-11-19 NOTE — NURSING NOTE
"Charge nurse rounding on the unit. Patient standing outside of her room. Charge nurse asked if patient was doing okay, patient stated \"I want to see Melina\". Offered to bring baby into room with supervision, patient just said \"I just need to see her.\" Escorted patient to nursery where supervision was available.     Charge nurse handed baby to patient, who was sitting in the rocking chair. Patient seemed appropriate, but quite nervous, taking deep breaths. Helped mother with positioning, and informed mother that I would be nearby if she needed anything.     Patient said \"I'm sorry.. I'm just overwhelmed. Very overwhelmed.\"     Mother rocking infant, stroking cheeks, and smiling with infant in her arms, saying \"She's so beautiful. She's just so adorable.\"     Mother asked if charge nurse would help her change a diaper and show her how to swaddle the baby. Educated patient on how to change the diaper and do the swaddle.     Mother asked if I could help her feed baby. I asked patient if she would like to breast or bottle feed this time. She said \"I.. don't.. know. I'm..sorry.\" I offered to help patient with whatever feeding method she chose. Patient silent, but continued to smile at baby.     Patient asked if we could help her breastfeed. Offered to reach lactation consultants, who were already here, and patient accepted.     Patient, infant, and nursing assistant returned to patient's room where lactation and the patient's father were waiting.   "

## 2024-11-19 NOTE — CASE MANAGEMENT/SOCIAL WORK
Continued Stay Note  [facility]     Patient Name: Kingsley Grimm  MRN: [id number]  Today's Date: 11/19/2024    Admit Date: 11/18/2024    Plan:  petition   Discharge Plan       Row Name 11/19/24 1821       Plan    Plan MH petition    Plan Comments Pt has continued to have very little communication and concerning behavior. She did complete her EPSD and answered that she sometimes had SI in past 7 days. MSW spoke with pt, her mother and father, and father of the baby. MSW expressed concerns for mother's mental health. Pt's mother stated pt has been admitted to [facility] and [facility] in past. Mother states inpt treatment does not help the pt. She states pt can continue with her outpatient treatment. Spoke with pt's MD and he agrees pt needs inpt treatment. Mother became very agitated in room and raised her voice. She stated she would advocate for the pt and began yelling at pt's father that he was not there to assist when pt was younger. MSW attempted to redirect pt's mother and was unable to do so. MSW left room and spoke with Fob. MSW completed  petition signed by Dr Ramirez and faxed to [address]. Pt's mother stated she had guardianship of pt. MSW previously spoke with her about but she has not presented these papers for our records.                   Discharge Codes    No documentation.                       NELSON Parra     Here for diarrhea x 1 day. Pain to R calf area.  Seen in another 2 days ago, dx with sciatica, tested + pregnancy, unknown AOG

## 2024-11-19 NOTE — PROGRESS NOTES
Patient apparently showing signs that require mental health evaluation.  I was informed by social work that she will be transferring to another facility for that issue.  Patient so far has been uncooperative with the staff.
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
## 2024-11-19 NOTE — SIGNIFICANT NOTE
"Pt.s significant other followed me out into the rosado and stated that he believes pt has not slept at all since Sunday. He requests that pt have sleep medicine, \"stronger than melatonin\" available for pt tonight  "

## 2024-11-19 NOTE — SIGNIFICANT NOTE
Father of pt called nurse to room re: pt bleeding. Pt had blood on her pants. It appears that pt did not put a pad in her underwear after shower and had gotten blood on a chair, etc in the room.  Encouraged pt to use a pad with clean underwear and pants. She did not respond. And was unmoving while standing in the middle of the room. I asked if she wanted her mother's help and after a few minutes she said yes. Her mother came into the room and led her by the hand into the bathroom to help her clean up.

## 2024-11-20 NOTE — DISCHARGE SUMMARY
Discharge Summary    Date of Admission: 2024  Date of Discharge:  2024      Patient: Kingsley Grimm      MR#:[id number]    Delivery Provider: Rosmery Ramirez    Discharge Surgeon/OB: Dr. Vitaly Felix    Presenting Problem/History of Present Illness  Normal labor [O80, Z37.9]     Patient Active Problem List    Diagnosis     *Normal labor [O80, Z37.9]     Normal labor [O80, Z37.9]     40 weeks gestation of pregnancy [Z3A.40]     Major depressive disorder, single episode, unspecified [F32.9]     Anorexia nervosa [F50.00]     Schizoaffective disorder, depressive type [F25.1]          Discharge Diagnosis: Vaginal delivery at 40w0d    Procedures:  Vaginal, Spontaneous    2024   10:13 AM       Discharge Date: 2024;     Hospital Course  Patient is a 29 y.o. female  at 40w0d status post vaginal delivery without complication. Postpartum the patient did well. She remained afebrile, with vital signs stable. She was ready for discharge on postpartum day 2.     Infant:   female fetus 2880 g (6 lb 5.6 oz) with Apgar scores of 8 , 9  at five minutes.    Condition on Discharge:  Stable    Vital Signs  Temp:  [98 °F (36.7 °C)-98.2 °F (36.8 °C)] 98.2 °F (36.8 °C)  Heart Rate:  [60-83] 83  Resp:  [20] 20  BP: (104-110)/(74-76) 110/76    Lab Results   Component Value Date    WBC 9.49 2024    HGB 12.1 2024    HCT 35.1 2024    MCV 90.0 2024     2024       Discharge Disposition  Psychiatric Hospital or Unit (DC - External or [facility])    Discharge Medications     Discharge Medications        Changes to Medications        Instructions Start Date   sertraline 50 MG tablet  Commonly known as: Zoloft   50 mg, Oral, Daily      sertraline 25 MG tablet  Commonly known as: ZOLOFT   25 mg, Oral, Daily, Daily total 75 mg             Continue These Medications        Instructions Start Date   melatonin 5 MG sublingual tablet sublingual tablet   Place  under the tongue.       prenatal vitamin 27-0.8 27-0.8 MG tablet tablet   Daily               Discharge Diet:     Activity at Discharge:     Follow-up Appointments  Future Appointments   Date Time Provider Department Center   11/21/2024  1:30 PM Rosmery Ramirez MD [facility]   1/20/2025 10:45 AM Charlene Meneses DO [facility]         Vitaly Felix MD  11/19/24  19:31 EST  [facility]

## 2024-11-20 NOTE — NURSING NOTE
Dr. Ramirez was notified of patient's transfer to [facility] via 's Office.  Report was called to [facility] by Magaly Meehan RN.  I faxed the H&P and other notes to the intake department of [facility].  The intake department physician asked have a physician to physician phone call with Dr. Ramirez. I provided Dr. Ramirez with the telephone number to [facility].     Spine appears normal, range of motion is not limited, no muscle or joint tenderness

## 2024-11-20 NOTE — NURSING NOTE
Assisting with patient transfer.  officers are currently on the unit. Called on call OB for discharge orders for patient. Still waiting on orders but patient will not sign documents. Will fax all paperwork to [facility].

## 2024-11-21 ENCOUNTER — TELEPHONE (OUTPATIENT)
Dept: OBSTETRICS AND GYNECOLOGY | Facility: CLINIC | Age: 29
End: 2024-11-21

## 2024-11-21 NOTE — TELEPHONE ENCOUNTER
"Patient of Dr. Ramirez; had  24, discharged 24.  Returned call to Greg; patient's fiance and FOB.   [facility] patient was referred to [facility] 24; was taken by the . [facility] released her shortly after midnight that same night. Told them she needs an anti-psychotic for auditory delusions and needs her \"meds adjusted\". Currently taking Zoloft 75 mg daily.   [facility] he is trying to get her scheduled with a mental health provider for medication management; cannot see her until 24.   [facility] patient is talking and functioning but \"goes in an out\" of appropriate behavior; manic at times. He denies any safety concern.   [facility] baby is still in hospital. CPS case has been opened. Patient is unable to have custody at this time. Paternity paperwork has not been completed so he is not allowed custody yet. [facility] he plans to contact an  to help him.   He is asking if Dr. Ramirez could see patient today to adjust medications and RX anti-psychotic.   Discussed with Dr. Ramirez. He stated that is not his area of expertise. He states patient can go to the [facility] for immediate needs. Should go to [facility] for any emergent mental health needs.   Spoke with Greg again. Informed him of Dr. Ramirez's recommendations. He v/u; stated that he was given information for the [facility] by [facility]. Patient has been scheduled for a therapy appointment on 24 and a med management appointment 24. He also stated that \"a safety plan has been put in place\" and they will be able to bring the baby home today at 3 pm. Appointment scheduled for 6 week PP visit.     "

## 2024-11-21 NOTE — CASE MANAGEMENT/SOCIAL WORK
Continued Stay Note   [facility]     Patient Name: Kingsley Grimm  MRN: [id number]  Today's Date: 11/21/2024    Admit Date: 11/18/2024    Plan: [facility]   Discharge Plan       Row Name 11/21/24 0638       Plan    Plan [facility]    Plan Comments Late entry.  petition accepted and signed by     Final Discharge Disposition Code 65 - psychiatric hospital or unit    Final Note tx to [facility] by                    Discharge Codes    No documentation.                 Expected Discharge Date and Time       Expected Discharge Date Expected Discharge Time    Nov 19, 2024               NELSON Parra

## 2024-11-21 NOTE — TELEPHONE ENCOUNTER
Hub staff attempted to follow warm transfer process and was unsuccessful     Caller: ALVIN MADRIGAL    Relationship to patient: Emergency Contact    Best call back number: [phone]  LISTED ON  VERBAL    Patient is needing: ALVIN CALLED TO SEE IF TODAY'S APPT FOR OB FOLLOW UP AT 1:30 PM WITH  CAN BE UPDATED TO A POSTPARTUM VISIT. ALVIN WOULD LIKE PATIENT SEEN TODAY FOR MENTAL HEALTH CHECK AND DISCUSS ADJUSTING MEDICATION. VAGINAL DELIVERY 11/18/24.    PLEASE CALL TO CONFIRM IF OKAY TO KEEP TODAY'S APPT.

## 2024-11-26 ENCOUNTER — PATIENT OUTREACH (OUTPATIENT)
Dept: LABOR AND DELIVERY | Facility: HOSPITAL | Age: 29
End: 2024-11-26
Payer: MEDICAID

## 2024-11-26 DIAGNOSIS — F32.2 CURRENT SEVERE EPISODE OF MAJOR DEPRESSIVE DISORDER WITHOUT PSYCHOTIC FEATURES WITHOUT PRIOR EPISODE: ICD-10-CM

## 2024-11-26 NOTE — OUTREACH NOTE
[facility]  Unable to Reach    Questions/Answers      Flowsheet Row Responses   Pending Outreach Postpartum Check-in   Call Attempt First   Outcome No answer/busy, Left message   Next Call Attempt Date 11/27/24   Unable to reach comments: LVM asking for return call. Will plan contact tomorrow.              Kaele Finley RN  Maternity Nurse Navigator    11/26/2024, 11:14 EST

## 2024-11-27 ENCOUNTER — PATIENT OUTREACH (OUTPATIENT)
Dept: LABOR AND DELIVERY | Facility: HOSPITAL | Age: 29
End: 2024-11-27
Payer: MEDICAID

## 2024-11-27 DIAGNOSIS — F32.1 CURRENT MODERATE EPISODE OF MAJOR DEPRESSIVE DISORDER WITHOUT PRIOR EPISODE: Primary | ICD-10-CM

## 2024-11-27 DIAGNOSIS — Z34.83 PRENATAL CARE, SUBSEQUENT PREGNANCY, THIRD TRIMESTER: ICD-10-CM

## 2024-11-27 DIAGNOSIS — Z34.02 FIRST PREGNANCY, SECOND TRIMESTER: ICD-10-CM

## 2024-11-27 NOTE — TELEPHONE ENCOUNTER
Refill fax received from [facility] for Sertraline 50mg.     Pt has PP appointment with Dr. Ramirez 12/31/2024. She is taking 50mg Sertraline from Dr. Ramirez and 25mg Sertraline from her PCP

## 2024-11-27 NOTE — OUTREACH NOTE
[facility]  Unable to Reach    Questions/Answers      Flowsheet Row Responses   Pending Outreach Postpartum Check-in   Call Attempt Second   Outcome No answer/busy, Left message   Next Call Attempt Date 12/02/24   Unable to reach comments: LVM asking for return call. Will plan contact next week.            Kalee Finley RN  Maternity Nurse Navigator    11/27/2024, 10:04 EST

## 2024-12-02 ENCOUNTER — PATIENT OUTREACH (OUTPATIENT)
Dept: LABOR AND DELIVERY | Facility: HOSPITAL | Age: 29
End: 2024-12-02
Payer: MEDICAID

## 2024-12-02 RX ORDER — SERTRALINE HYDROCHLORIDE 25 MG/1
25 TABLET, FILM COATED ORAL DAILY
Qty: 30 TABLET | Refills: 5 | OUTPATIENT
Start: 2024-12-02

## 2024-12-02 NOTE — OUTREACH NOTE
"[facility]  Postpartum Check-In    Questions/Answers      Flowsheet Row Responses   Visit Setting Telephone   Best Method for Contacting Cell   OB Discharge Note Reviewed  Reviewed   OB Discharge Medications Reviewed  Reviewed    discharged home with mother? No   Comment Infant discharged into the custody of Allison Cortez. Ines has supervised visitation only.   Followup Appointments Made Yes   Appointment Date 24   Provider/Agency [facility]            Review of Systems   Unable to perform ROS: Psychiatric disorder     Most Recent [place]  Depression Scale Score (EPDS)    Performed by a clinician: 16 (2024  7:01 AM)    5 Ps Screen  Not completed, see note below.     Spoke with Ines for the PP check in call. Ines states when asked how she is doing and feeling \"It's a lot, I'm missing Melina\". When asked if she was able to see the baby she reports \"it's a lot\" after several minutes of hemming and hawing, stumbling to form a response. I assured her we didn't need to discuss the baby if it was to difficult a conversation at this time. I enquired about her physical health and wellbeing and she stated \" I will have to call you another time, it's a lot\".  Confirmed my availability and contact information. Call was disconnected.     Kalee Finley RN  Maternity Nurse Navigator    2024, 13:11 EST    "

## 2024-12-09 ENCOUNTER — PATIENT OUTREACH (OUTPATIENT)
Dept: CALL CENTER | Facility: HOSPITAL | Age: 29
End: 2024-12-09
Payer: MEDICAID

## 2024-12-09 NOTE — OUTREACH NOTE
Motherhood Connection Survey      Flowsheet Row Responses   [facility] facility patient discharged from? [place]   Week 1 attempt successful? No   Unsuccessful attempts Attempt 2   Reschedule Tomorrow              KHANG CORDON - Registered Nurse

## 2024-12-09 NOTE — OUTREACH NOTE
Motherhood Connection Survey      Flowsheet Row Responses   [facility] facility patient discharged from? [place]   Week 1 attempt successful? No  [female vag, read note]   Unsuccessful attempts Attempt 1   Reschedule Today              KHANG CORDON - Registered Nurse

## 2024-12-10 ENCOUNTER — PATIENT OUTREACH (OUTPATIENT)
Dept: CALL CENTER | Facility: HOSPITAL | Age: 29
End: 2024-12-10
Payer: MEDICAID

## 2024-12-10 NOTE — OUTREACH NOTE
Motherhood Connection Survey      Flowsheet Row Responses   [facility] facility patient discharged from? [place]   Week 1 attempt successful? No   Unsuccessful attempts Attempt 3   Revoke Decline to participate  [Revoked]              Madan BURRIS - Registered Nurse

## 2024-12-31 ENCOUNTER — POSTPARTUM VISIT (OUTPATIENT)
Dept: OBSTETRICS AND GYNECOLOGY | Facility: CLINIC | Age: 29
End: 2024-12-31
Payer: MEDICAID

## 2024-12-31 VITALS
SYSTOLIC BLOOD PRESSURE: 110 MMHG | BODY MASS INDEX: 20.57 KG/M2 | WEIGHT: 128 LBS | HEIGHT: 66 IN | DIASTOLIC BLOOD PRESSURE: 60 MMHG

## 2024-12-31 DIAGNOSIS — Z30.011 ENCOUNTER FOR INITIAL PRESCRIPTION OF CONTRACEPTIVE PILLS: ICD-10-CM

## 2024-12-31 DIAGNOSIS — F32.9 CURRENT EPISODE OF MAJOR DEPRESSIVE DISORDER WITHOUT PRIOR EPISODE, UNSPECIFIED DEPRESSION EPISODE SEVERITY: ICD-10-CM

## 2024-12-31 DIAGNOSIS — F25.1 SCHIZOAFFECTIVE DISORDER, DEPRESSIVE TYPE: ICD-10-CM

## 2024-12-31 RX ORDER — OLANZAPINE 5 MG/1
5 TABLET ORAL
COMMUNITY
Start: 2024-12-16

## 2024-12-31 RX ORDER — NORGESTIMATE AND ETHINYL ESTRADIOL 0.25-0.035
1 KIT ORAL DAILY
Qty: 84 TABLET | Refills: 3 | Status: SHIPPED | OUTPATIENT
Start: 2024-12-31

## 2024-12-31 NOTE — PROGRESS NOTES
"        Chief Complaint   Patient presents with    Postpartum Care       Postpartum Visit         Kingsley Grimm is a 29 y.o.  who presents today for a 6 week(s) postpartum check.     C/S: no     Vaginal, Spontaneous   Information for the patient's :  Melina Cortez [[id number]]   2024   female   Melina Cortez   2880 g (6 lb 5.6 oz)   Gestational Age: 40w0d       Baby Discharged:  currently in FOB custody. Patients mother \"Zahra\" with her today as her visit. She is scheduled to have her first supervised visit with baby today.   Delivering Physician: Rosmery Ramirez MD    Her pregnancy was complicated by  major depressive disorder . The laceration was 2nd degree and  she states she is having pain but unsure if it is 'emotional/physical at this point.'  Patient describes vaginal bleeding as absent.  Patient is  baby not currently in patient custody-she is c/o bilateral leaking breasts with pain .  She desires OCPs for contraception-states has taken sprintec in the past and did well.      She would like to discuss the following complaints today: patient is upset today, states wants to 'burn this place down' but admits that she is 'joking because that is a felony' and that she 'hates men.\" Patient is refusing to see her scheduled provider (Dr Ramirez) today. She is refusing physical exam.    Patient is currently taking zyprexa per [facility]-patient was admitted after delivery-see notes from hospital admission. She has an established therapist Corinne Morris but has not been able to be evaluated due to holiday schedule.     Patient reports concerns for postpartum depression/anxiety. Patient admits to suicidal or homicidal ideation on her PP depression scale but refuses to discuss if this is current thought or if she has any plan at this time. Patient became agitated when asked.  Her postpartum depression screening questionnaire: 12. She is currently being treated with zyprexa. She " "feels it is not helping.      Last Pap : 04/04/2024. Results: negative. HPV: negative.   Last Completed Pap Smear            PAP SMEAR (Every 3 Years) Next due on 4/4/2027 04/04/2024  LIQUID-BASED PAP SMEAR WITH HPV GENOTYPING REGARDLESS OF INTERPRETATION (BERTA,COR,MAD)                      The additional following portions of the patient's history were reviewed and updated as appropriate: allergies and current medications.    Review of Systems   Respiratory: Negative.  Negative for shortness of breath.    Cardiovascular: Negative.  Negative for chest pain.   Gastrointestinal: Negative.  Negative for abdominal distention and abdominal pain.   Genitourinary:  Positive for menstrual problem (irregular). Negative for breast discharge, breast lump, breast pain, pelvic pain, pelvic pressure, vaginal bleeding, vaginal discharge and vaginal pain.   Psychiatric/Behavioral:  Positive for agitation and suicidal ideas. The patient is nervous/anxious.      All other systems reviewed and are negative.     I have reviewed and agree with the HPI, ROS, and historical information as entered above. Flores Chacko, APRN      /60   Ht 167.6 cm (66\")   Wt 58.1 kg (128 lb)   LMP 02/01/2024   Breastfeeding No   BMI 20.66 kg/m²     Physical Exam  Vitals and nursing note reviewed.   Constitutional:       General: She is not in acute distress.     Appearance: Normal appearance. She is not ill-appearing or toxic-appearing.      Comments: Declined physical exam   HENT:      Head: Normocephalic and atraumatic.   Pulmonary:      Effort: Pulmonary effort is normal.   Neurological:      Mental Status: She is alert and oriented to person, place, and time.   Psychiatric:         Mood and Affect: Mood is anxious and depressed. Affect is angry.         Speech: Speech is rapid and pressured.         Behavior: Behavior is agitated and hyperactive. Behavior is not aggressive.         Thought Content: Thought content is paranoid and " delusional. Thought content does not include homicidal or suicidal ideation. Thought content does not include homicidal or suicidal plan.           Assessment and Plan    Problem List Items Addressed This Visit       Major depressive disorder, single episode, unspecified    Relevant Medications    sertraline (ZOLOFT) 25 MG tablet    sertraline (Zoloft) 50 MG tablet    OLANZapine (zyPREXA) 5 MG tablet    Schizoaffective disorder, depressive type    Relevant Medications    sertraline (ZOLOFT) 25 MG tablet    sertraline (Zoloft) 50 MG tablet    OLANZapine (zyPREXA) 5 MG tablet     Other Visit Diagnoses       Postpartum care following vaginal delivery    -  Primary    Encounter for initial prescription of contraceptive pills        Relevant Medications    norgestimate-ethinyl estradiol (Sprintec 28) 0.25-35 MG-MCG per tablet            S/p Vaginal delivery, 6 week(s) postpartum.  Doing well.    Physical exam declined.   Return to normal physical activity.  No pelvic restrictions.   Baby is doing well with father. Patient plans to see baby today after visit.   Postpartum depression/anxiety- EPDS 12. Patient does not desire to discuss feelings at this time. Mom and patient reports plan is in place if patient begins to have plans of suicide. Patient will follow up with psych provider for management of depression and schizoaffective disorder.   Contraception options reviewed. She desires Sprintec for contraception. She states she is not planning to be sexually active. She wants contraception for management of her menstrual cycles. Benefits, risks, instructions, and potential se reviewed. Pt and Mother VU.   Sprintec education included in patient instructions.   Return in about 1 year (around 12/31/2025) for Annual physical or sooner if needed.     Flores Chacko, APRN  12/31/2024

## 2025-01-20 ENCOUNTER — OFFICE VISIT (OUTPATIENT)
Dept: FAMILY MEDICINE CLINIC | Facility: CLINIC | Age: 30
End: 2025-01-20
Payer: MEDICAID

## 2025-01-20 VITALS
OXYGEN SATURATION: 99 % | BODY MASS INDEX: 21.76 KG/M2 | HEART RATE: 92 BPM | WEIGHT: 135.4 LBS | HEIGHT: 66 IN | SYSTOLIC BLOOD PRESSURE: 108 MMHG | DIASTOLIC BLOOD PRESSURE: 76 MMHG

## 2025-01-20 DIAGNOSIS — F25.1 SCHIZOAFFECTIVE DISORDER, DEPRESSIVE TYPE: ICD-10-CM

## 2025-01-20 DIAGNOSIS — Z00.00 ANNUAL PHYSICAL EXAM: Primary | ICD-10-CM

## 2025-01-20 PROBLEM — Z37.9 NORMAL LABOR: Status: RESOLVED | Noted: 2024-11-18 | Resolved: 2025-01-20

## 2025-01-20 PROCEDURE — 2014F MENTAL STATUS ASSESS: CPT | Performed by: FAMILY MEDICINE

## 2025-01-20 PROCEDURE — 99395 PREV VISIT EST AGE 18-39: CPT | Performed by: FAMILY MEDICINE

## 2025-01-20 PROCEDURE — 1160F RVW MEDS BY RX/DR IN RCRD: CPT | Performed by: FAMILY MEDICINE

## 2025-01-20 PROCEDURE — 1159F MED LIST DOCD IN RCRD: CPT | Performed by: FAMILY MEDICINE

## 2025-01-20 NOTE — PROGRESS NOTES
Female Physical Note      Date: 2025   Patient Name: Kingsley Grimm  : 1995   MRN: [id number]     Chief Complaint:    Chief Complaint   Patient presents with    Annual Exam     yearly    Anxiety     Wants to discuss her medications and possibly make some changes        History of Present Illness: Kingsley Grimm is a 29 y.o. female who is here today for their annual health maintenance and physical. She is originally from [place].  She regularly lives at home with her fiancé and 4 daughters. They have 2 pet dogs, a kitten, and a bunny.  She works as a homemaker.    Patient has had significant changes since her last encounter.  She did deliver her daughter this November but unfortunately has suffered from postpartum depression and psychosis.  Per patient report she was admitted to the [facility], and available records at this time show that she was transferred to [facility].  Will obtain records of these encounters.  Per patient report she was discharged on Zyprexa 5 mg but she states that the discharge recommendations were confusing for this medication and along with her traumas related to her inpatient stay she decided to discontinue this medicine once her last fill ran out.  Of note she is only currently taking Sprintec and a ibuprofen as needed.  Prior to her delivery she was prescribed Zoloft 75 mg once daily for depression.  Patient reports that she feels after her delivery the medications and narcotic she was given caused her to have inappropriate speech that she thinks was perceived as psychosis.  Patient notes that [facility] was contacted and she was unable to take her baby home.  She notes that her significant other currently cares for her daughter and she has a court date next week to see if she is fit to return home.  She notes that shortly after her delivery she was recommended to go to an inpatient facility and notes that she was forced to take a variety of medications and had an  unpleasant experience.  She does note that at discharge she was recommended to see a variety of providers.  She notes that she does not like paperwork and is unsure the names or times of any of these follow-up appointments.  She does note that she consistently talks with her therapist Ms. Corinne Morris with a biding help a telehealth twice a week.  She is agreeable today to establish with psychiatrist, and would be interested in using a [facility] provider.  Discussed emergent care options for psychiatric needs, patient voiced understanding.  Discussed medication options, patient became noticeably agitated when discussing different therapies and notes that her previous traumas caused this.  She would be interested in GeneSight testing today to see if there would be a good choice for her.     Patient is up-to-date on her vaccinations.  She recently underwent a laboratory evaluation that was overall within normal limits.      Subjective      Review of Systems:   Review of Systems   Psychiatric/Behavioral:  Positive for agitation, depressed mood and stress. The patient is nervous/anxious.    All other systems reviewed and are negative.      Past Medical History, Social History, Family History and Care Team were all reviewed with patient and updated as appropriate.     Medications:     Current Outpatient Medications:     norgestimate-ethinyl estradiol (Sprintec 28) 0.25-35 MG-MCG per tablet, Take 1 tablet by mouth Daily., Disp: 84 tablet, Rfl: 3    Prenatal Vit-Fe Fumarate-FA (prenatal vitamin 27-0.8) 27-0.8 MG tablet tablet, Take  by mouth Daily., Disp: , Rfl:     Allergies:   No Known Allergies    Immunizations:  Td/Tdap(Booster Q 10 yrs): Up-to-date  Flu (Yearly): Up-to-date  Colorectal Screening:     Last Completed Colonoscopy       This patient has no relevant Health Maintenance data.           Pap:    Last Completed Pap Smear            PAP SMEAR (Every 3 Years) Next due on 4/4/2027 04/04/2024  LIQUID-BASED PAP  "SMEAR WITH HPV GENOTYPING REGARDLESS OF INTERPRETATION (BERTA,COR,MAD)                   Mammogram:    Last Completed Mammogram       This patient has no relevant Health Maintenance data.                 PHQ-2 Depression Screening  Little interest or pleasure in doing things?     Feeling down, depressed, or hopeless?     PHQ-2 Total Score           Objective     Physical Exam:  Vital Signs:   Vitals:    01/20/25 1039   BP: 108/76   Pulse: 92   SpO2: 99%   Weight: 61.4 kg (135 lb 6.4 oz)   Height: 167.6 cm (65.98\")     BMI is within normal parameters. No other follow-up for BMI required.       Physical Exam  Vitals and nursing note reviewed.   Constitutional:       Appearance: Normal appearance. She is normal weight.   HENT:      Head: Normocephalic and atraumatic.      Nose: Nose normal.      Mouth/Throat:      Mouth: Mucous membranes are moist.   Eyes:      Extraocular Movements: Extraocular movements intact.      Pupils: Pupils are equal, round, and reactive to light.   Cardiovascular:      Rate and Rhythm: Normal rate and regular rhythm.   Pulmonary:      Effort: Pulmonary effort is normal.      Breath sounds: Normal breath sounds.   Abdominal:      General: Abdomen is flat.   Musculoskeletal:         General: Normal range of motion.      Cervical back: Normal range of motion.   Skin:     General: Skin is warm.   Neurological:      General: No focal deficit present.      Mental Status: She is alert and oriented to person, place, and time.   Psychiatric:         Mood and Affect: Mood normal. Mood is anxious and depressed. Affect is tearful.         Behavior: Behavior normal.         Thought Content: Thought content normal.         Judgment: Judgment normal.         Procedures    Assessment / Plan      Assessment/Plan:   Diagnoses and all orders for this visit:    1. Annual physical exam (Primary)  Assessment & Plan:  Preventative screening discussed, immunizations reviewed      2. Schizoaffective disorder, " depressive type  Assessment & Plan:  Patient's depression is a recurrent episode that is severe with psychosis. Depression is active and worsening.    Plan:   Continue current medication therapy   Referral to psychiatry  Referral to   Patient hesitant to start any medication until GeneSight received.  Will obtain previous records from inpatient discharge to determine if psychiatry appointment was scheduled.  Will also try to get urgent referral to [facility] to establish care.  Followup in 2 weeks.     Orders:  -     Ambulatory Referral to Psychiatry  -     Ambulatory Referral to Social Care Services (Amb Case Mgmt)        Follow Up:   Return in about 2 weeks (around 2/3/2025) for Recheck.    Healthcare Maintenance:   Counseling provided on immunizations, preventative screenings, diet and exercise recommendations.   Kingsley Grimm voices understanding and acceptance of this advice and will call back with any further questions or concerns. AVS with preventive healthcare tips printed for patient.     Charlene Meneses DO   [facility] [address]

## 2025-01-20 NOTE — ASSESSMENT & PLAN NOTE
Patient's depression is a recurrent episode that is severe with psychosis. Depression is active and worsening.    Plan:   Continue current medication therapy   Referral to psychiatry  Referral to   Patient hesitant to start any medication until GeneSight received.  Will obtain previous records from inpatient discharge to determine if psychiatry appointment was scheduled.  Will also try to get urgent referral to [facility] to establish care.  Followup in 2 weeks.

## 2025-01-21 ENCOUNTER — REFERRAL TRIAGE (OUTPATIENT)
Age: 30
End: 2025-01-21
Payer: MEDICAID

## 2025-01-22 ENCOUNTER — PATIENT OUTREACH (OUTPATIENT)
Age: 30
End: 2025-01-22
Payer: MEDICAID

## 2025-01-22 NOTE — OUTREACH NOTE
Social Work Assessment  Questions/Answers      Flowsheet Row Most Recent Value   Referral Source physician   Reason for Consult community resources   Preferred Language English   Advance Care Planning Reviewed no concerns identified   Employment Status unemployed   Medications independent   Meal Preparation independent   Housekeeping independent   Laundry independent   Shopping independent        SDOH updated and reviewed with the patient during this program:  --     Depression: Not at risk (11/18/2024)    PHQ-2     PHQ-2 Score: 2      Financial Resource Strain: Low Risk  (11/18/2024)    Overall Financial Resource Strain (CARDIA)     Difficulty of Paying Living Expenses: Not very hard      --     Food Insecurity: No Food Insecurity (11/18/2024)    Hunger Vital Sign     Worried About Running Out of Food in the Last Year: Never true     Ran Out of Food in the Last Year: Never true      --     Health Literacy: Not At Risk (1/22/2025)    Education     Help with school or training?: No     Preferred Language: English      --     Housing Stability: Not At Risk (11/18/2024)    Housing Stability     Current Living Arrangements: home     Potentially Unsafe Housing Conditions: none      --     Transportation Needs: No Transportation Needs (11/18/2024)    PRAPARE - Transportation     Lack of Transportation (Medical): No     Lack of Transportation (Non-Medical): No      --     Utilities: Not At Risk (11/18/2024)    [facility] Utilities     Threatened with loss of utilities: No      Continuing Care   [facility]    [address], [place] [facility] [place] [facility] [place] [address]    Phone: [phone]    Request Status: Pending - No Request Sent   Patient Outreach    SW contacted pt after receiving a provider referral. Pt states she needs help with scheduling with a provider for medication management. She has done some research and would like to go to [facility], but is feeling overwhelmed with figuring out transportation  and scheduling an appointment. SW offered to assist in scheduling appointment.  SW contacted [facility]. Scheduled a telehealth appointment for January 30th at 10 AM. Updated pt. She denies any other issues SW can assist with today, but SW encouraged her to call if needs arise.  Lizzy RIOS -   Ambulatory Case Management    1/22/2025, 13:15 EST

## 2025-02-03 ENCOUNTER — PATIENT OUTREACH (OUTPATIENT)
Age: 30
End: 2025-02-03
Payer: MEDICAID

## 2025-02-03 ENCOUNTER — OFFICE VISIT (OUTPATIENT)
Dept: FAMILY MEDICINE CLINIC | Facility: CLINIC | Age: 30
End: 2025-02-03
Payer: MEDICAID

## 2025-02-03 VITALS
WEIGHT: 128.4 LBS | BODY MASS INDEX: 20.63 KG/M2 | HEIGHT: 66 IN | HEART RATE: 78 BPM | DIASTOLIC BLOOD PRESSURE: 84 MMHG | OXYGEN SATURATION: 99 % | SYSTOLIC BLOOD PRESSURE: 115 MMHG

## 2025-02-03 DIAGNOSIS — N89.8 VAGINAL IRRITATION: ICD-10-CM

## 2025-02-03 DIAGNOSIS — F25.1 SCHIZOAFFECTIVE DISORDER, DEPRESSIVE TYPE: Primary | ICD-10-CM

## 2025-02-03 DIAGNOSIS — F32.1 CURRENT MODERATE EPISODE OF MAJOR DEPRESSIVE DISORDER WITHOUT PRIOR EPISODE: ICD-10-CM

## 2025-02-03 PROBLEM — Z3A.40 40 WEEKS GESTATION OF PREGNANCY: Status: RESOLVED | Noted: 2024-11-18 | Resolved: 2025-02-03

## 2025-02-03 LAB
BILIRUB BLD-MCNC: NEGATIVE MG/DL
CLARITY, POC: CLEAR
COLOR UR: YELLOW
EXPIRATION DATE: NORMAL
GLUCOSE UR STRIP-MCNC: NEGATIVE MG/DL
KETONES UR QL: NEGATIVE
LEUKOCYTE EST, POC: NEGATIVE
Lab: NORMAL
NITRITE UR-MCNC: NEGATIVE MG/ML
PH UR: 6 [PH] (ref 5–8)
PROT UR STRIP-MCNC: NEGATIVE MG/DL
RBC # UR STRIP: NEGATIVE /UL
SP GR UR: 1.01 (ref 1–1.03)
UROBILINOGEN UR QL: NORMAL

## 2025-02-03 PROCEDURE — 99213 OFFICE O/P EST LOW 20 MIN: CPT | Performed by: FAMILY MEDICINE

## 2025-02-03 PROCEDURE — 1159F MED LIST DOCD IN RCRD: CPT | Performed by: FAMILY MEDICINE

## 2025-02-03 PROCEDURE — 1160F RVW MEDS BY RX/DR IN RCRD: CPT | Performed by: FAMILY MEDICINE

## 2025-02-03 RX ORDER — LAMOTRIGINE 25 MG/1
25 TABLET ORAL DAILY
Qty: 45 TABLET | Refills: 0 | Status: CANCELLED | OUTPATIENT
Start: 2025-02-03

## 2025-02-03 NOTE — OUTREACH NOTE
Patient Outreach    SW contacted pt for follow up. She did make her appointment with julio and reports that it went well. She denies current needs but SW encouraged her to call if needs arise.  Lizzy RIOS -   Ambulatory Case Management    2/3/2025, 16:02 EST

## 2025-02-03 NOTE — ASSESSMENT & PLAN NOTE
Patient's depression is a recurrent episode that is severe without psychosis. Depression is active and stable.    Plan:   Referral to psychiatry  Reach out to patient psychiatrist, will fax her Amedicaight results and likely start mood stabilizer medication    Followup in 4 weeks.

## 2025-02-03 NOTE — PROGRESS NOTES
Office Note     Name: Kingsley Grimm    : 1995     MRN: [id number]     Chief Complaint  Results (Pt would like to go over her gene sight results ) and perineal (She states that it is swollen and red and a little painful)    Subjective     History of Present Illness:  Kingsley Grimm is a 29 y.o. female who presents today for follow-up.  She presents today with her mother-in-law.    Patient was seen today followed by mental health.  She notes that since last visit she has had a telemedicine encounter with Dr. Jacqueline Washington at [facility].  She notes that while this visit was helpful she unfortunately had multiple dogs in the background and this made it difficult for her to focus on the conversation.  She notes that the psychiatrist is awaiting the results of the GeneSight before initiating any medications.  She is interested in going over her GeneSight today.  She notes that she does not have a follow-up appointment scheduled yet as she is waiting on her psychiatrist to have the GeneSight results.  She does note that she follows regularly with her psychotherapist through telemedicine.  She does note that she has a court date scheduled for the beginning of March and is currently still staying with her mother in [place].  She does note that she continues to have intrusive thoughts.  Our office is still awaiting the records from [facility].    Patient also notes that she is having some redness, swelling, and pain in her perineal region.  She notes this will happen intermittently since her delivery.  She notes that due to her complicated postpartum history she has not had routine postpartum care and wonders if this is contributing.    Review of Systems:   Review of Systems   Psychiatric/Behavioral:  Positive for decreased concentration, dysphoric mood, hallucinations and depressed mood. The patient is nervous/anxious.    All other systems reviewed and are negative.      Past Medical History:   Past  Medical History:   Diagnosis Date    Anxiety     Depression     Eating disorder     History of drug overdose 2021    intentional, with ibuprofen    History of suicidal ideation 2022    with paranoia    Psychosis     SAB (spontaneous )        Past Surgical History:   Past Surgical History:   Procedure Laterality Date    D & C WITH SUCTION  10/09/23    DILATATION AND EVACUATION  10/2023    MULTIPLE TOOTH EXTRACTIONS  2022    impacted teeth numbers 1, 17,and 32.; Surgical extraction soft tissue impaction # 16    WISDOM TOOTH EXTRACTION  Not sure       Family History:   Family History   Problem Relation Age of Onset    Anxiety disorder Mother     Depression Mother     Anxiety disorder Father     Depression Father     Cancer Maternal Grandfather     Alcohol abuse Maternal Grandmother     Anxiety disorder Sister     Depression Sister         half-sister    Depression Paternal Aunt     Breast cancer Neg Hx     Ovarian cancer Neg Hx     Uterine cancer Neg Hx     Colon cancer Neg Hx        Social History:   Social History     Socioeconomic History    Marital status: Single   Tobacco Use    Smoking status: Never    Smokeless tobacco: Never    Tobacco comments:     Former vape user   Vaping Use    Vaping status: Former    Substances: Nicotine   Substance and Sexual Activity    Alcohol use: Not Currently    Drug use: Never    Sexual activity: Yes     Partners: Male     Birth control/protection: None     Comment: Currently pregnant       Immunizations:   Immunization History   Administered Date(s) Administered    ABRYSVO (RSV, 60+ or pregnant women 32-36 wks) 2024    COVID-19 (PFIZER) Purple Cap Monovalent 12/10/2021    Covid-19 (Pfizer) Gray Cap Monovalent 2022    Fluzone  >6mos 2024    Fluzone Quad >6mos (Multi-dose) 12/10/2021    Influenza, Unspecified 12/10/2021    Tdap 2024    flucelvax quad pfs =>4 YRS 2018        Medications:     Current Outpatient Medications:      "norgestimate-ethinyl estradiol (Sprintec 28) 0.25-35 MG-MCG per tablet, Take 1 tablet by mouth Daily., Disp: 84 tablet, Rfl: 3    Allergies:   No Known Allergies    Objective     Vital Signs  /84   Pulse 78   Ht 167.6 cm (65.98\")   Wt 58.2 kg (128 lb 6.4 oz)   SpO2 99%   BMI 20.73 kg/m²   Estimated body mass index is 20.73 kg/m² as calculated from the following:    Height as of this encounter: 167.6 cm (65.98\").    Weight as of this encounter: 58.2 kg (128 lb 6.4 oz).    BMI is within normal parameters. No other follow-up for BMI required.       Physical Exam  Vitals reviewed.   Constitutional:       Appearance: Normal appearance. She is normal weight.   HENT:      Head: Normocephalic and atraumatic.      Nose: Nose normal.      Mouth/Throat:      Mouth: Mucous membranes are moist.   Eyes:      Extraocular Movements: Extraocular movements intact.      Pupils: Pupils are equal, round, and reactive to light.   Cardiovascular:      Rate and Rhythm: Normal rate and regular rhythm.   Pulmonary:      Effort: Pulmonary effort is normal.      Breath sounds: Normal breath sounds.   Abdominal:      General: Abdomen is flat.   Musculoskeletal:         General: Normal range of motion.      Cervical back: Normal range of motion.   Skin:     General: Skin is warm.   Neurological:      General: No focal deficit present.      Mental Status: She is alert and oriented to person, place, and time.   Psychiatric:         Attention and Perception: She perceives auditory hallucinations.         Mood and Affect: Mood is anxious. Affect is flat.         Behavior: Behavior is withdrawn.         Thought Content: Thought content is paranoid.         Judgment: Judgment normal.            Procedures     Results:  No results found for this or any previous visit (from the past 24 hours).     Assessment and Plan     Assessment/Plan:  Diagnoses and all orders for this visit:    1. Schizoaffective disorder, depressive type " (Primary)  Assessment & Plan:  Patient's depression is a recurrent episode that is severe without psychosis. Depression is active and stable.    Plan:   Referral to psychiatry  Reach out to patient psychiatrist, will fax her GeneSight results and likely start mood stabilizer medication    Followup in 4 weeks.       2. Current moderate episode of major depressive disorder without prior episode    3. Vaginal irritation  Assessment & Plan:  Urinalysis unremarkable.  Recommend topical therapy with Tucks pads, sitz bath's and soaks.    Orders:  -     POC Urinalysis Dipstick, Automated        Follow Up  Return in about 3 weeks (around 2/24/2025) for Recheck.    Charlene Meneses, DO   [facility] [place] [facility]

## 2025-02-04 ENCOUNTER — TELEPHONE (OUTPATIENT)
Dept: FAMILY MEDICINE CLINIC | Facility: CLINIC | Age: 30
End: 2025-02-04
Payer: MEDICAID

## 2025-02-04 DIAGNOSIS — F25.1 SCHIZOAFFECTIVE DISORDER, DEPRESSIVE TYPE: Primary | ICD-10-CM

## 2025-02-04 RX ORDER — LAMOTRIGINE 100 MG/1
100 TABLET ORAL DAILY
Qty: 30 TABLET | Refills: 5 | Status: SHIPPED | OUTPATIENT
Start: 2025-03-06

## 2025-02-04 RX ORDER — LAMOTRIGINE 25 MG/1
25 TABLET ORAL DAILY
Qty: 45 TABLET | Refills: 0 | Status: SHIPPED | OUTPATIENT
Start: 2025-02-04

## 2025-02-04 NOTE — TELEPHONE ENCOUNTER
SHEILA has been requested but pt's chart is KIANA. She will send over initial assessment & discharge papers. She is faxing those over to us for now and once they find pt's whole chart, they will send other records as well.

## 2025-02-04 NOTE — TELEPHONE ENCOUNTER
Called and spoke with patient this morning about discussion with psychiatrist, Britany Washington NP.  At this time we will try trial of Lamictal, refill sent to [facility] pharmacy.  Patient gave permission to contact mother-in-law Vadim at [phone].  Discussed with mother-in-law about picking up the medication to start and following up at follow-up appointment on March 6.  Patient is scheduled to follow-up with psychiatrist as well later this month.

## 2025-02-28 NOTE — PROGRESS NOTES
OB FOLLOW UP  CC- Here for care of pregnancy        Kingsley Grimm is a 28 y.o.  16w1d patient being seen today for her obstetrical follow up visit. Patient reports when she is up on her feet for periods of time she reports her head feels fuzzy, she will lose vision, and feel like she is going to pass out. She reports she sometimes got that outside of pregnancy. She reports she will get headaches after that. Occasional abdominal cramping, heartburn, back pain.     Her prenatal care is complicated by (and status) : see below.  Patient Active Problem List   Diagnosis    Schizoaffective disorder, depressive type    Anorexia nervosa       Ultrasound Today: No    AFP: declines    ROS -   Patient Denies: leaking of fluid, vaginal bleeding, dysuria, excessive vomiting, and more than 6 contractions per hour  All other systems reviewed and are negative.       The additional following portions of the patient's history were reviewed and updated as appropriate: allergies and current medications.      I have reviewed and agree with the HPI, ROS, and historical information as entered above. Rosmery Ramirez MD          EXAM:     Prenatal Vitals  BP: 108/60  Weight: 56.7 kg (125 lb)   Fetal Heart Rate: 140         Urine Glucose Read-only: Negative  Urine Protein Read-only: Negative           Assessment and Plan    Problem List Items Addressed This Visit    None  Visit Diagnoses       Prenatal care in second trimester    -  Primary    Relevant Orders    POC Urinalysis Dipstick (Completed)    US Ob 14 + Weeks Single or First Gestation            Pregnancy at 16w1d  Fetal status reassuring.   Counseled on MSAFP alone in relation to OTD and placental issues.    Anatomy scan next visit.   Activity and Exercise discussed.  U/S ordered at follow up  Patient is on Prenatal vitamins  Return in about 1 month (around 2024) for us then.    Rosmery Ramirez MD  2024    Alert-The patient is alert, awake and responds to voice. The patient is oriented to time, place, and person. The triage nurse is able to obtain subjective information.

## 2025-03-06 ENCOUNTER — OFFICE VISIT (OUTPATIENT)
Dept: FAMILY MEDICINE CLINIC | Facility: CLINIC | Age: 30
End: 2025-03-06
Payer: MEDICAID

## 2025-03-06 VITALS
TEMPERATURE: 98.2 F | BODY MASS INDEX: 21.63 KG/M2 | OXYGEN SATURATION: 98 % | HEART RATE: 82 BPM | WEIGHT: 134.6 LBS | HEIGHT: 66 IN | DIASTOLIC BLOOD PRESSURE: 70 MMHG | SYSTOLIC BLOOD PRESSURE: 112 MMHG

## 2025-03-06 DIAGNOSIS — K59.04 CHRONIC IDIOPATHIC CONSTIPATION: ICD-10-CM

## 2025-03-06 DIAGNOSIS — F25.1 SCHIZOAFFECTIVE DISORDER, DEPRESSIVE TYPE: Primary | ICD-10-CM

## 2025-03-06 DIAGNOSIS — I95.1 ORTHOSTATIC HYPOTENSION: ICD-10-CM

## 2025-03-06 PROCEDURE — 99214 OFFICE O/P EST MOD 30 MIN: CPT | Performed by: FAMILY MEDICINE

## 2025-03-06 PROCEDURE — 1160F RVW MEDS BY RX/DR IN RCRD: CPT | Performed by: FAMILY MEDICINE

## 2025-03-06 PROCEDURE — 1159F MED LIST DOCD IN RCRD: CPT | Performed by: FAMILY MEDICINE

## 2025-03-06 RX ORDER — RISPERIDONE 1 MG/1
1 TABLET ORAL 3 TIMES DAILY
COMMUNITY
Start: 2025-02-15

## 2025-03-06 RX ORDER — LUMATEPERONE 42 MG/1
1 CAPSULE ORAL
COMMUNITY
Start: 2025-02-27

## 2025-03-06 NOTE — ASSESSMENT & PLAN NOTE
Discussed fluid hydration, compression socks.  Orthostatic blood pressures completed in office today.  Will place referral to cardiology for further evaluation.

## 2025-03-06 NOTE — ASSESSMENT & PLAN NOTE
Patient's depression is a recurrent episode that is moderate without psychosis. Depression is active and improving with treatment.    Plan:   Continue current medication therapy     Followup in 4 weeks.

## 2025-03-06 NOTE — PROGRESS NOTES
Answers submitted by the patient for this visit:  Problem not listed (Submitted on 3/4/2025)  Chief Complaint: Other medical problem  Reason for appointment: Follow up care  abdominal pain: No  anorexia: No  joint pain: Yes  change in stool: Yes  chest pain: No  chills: No  nasal congestion: Yes  cough: No  diaphoresis: Yes  fatigue: Yes  fever: No  headaches: Yes  joint swelling: No  myalgias: No  nausea: No  neck pain: No  numbness: Yes  rash: Yes  sore throat: No  swollen glands: No  dysuria: Yes  vertigo: Yes  visual change: Yes  vomiting: No  weakness: Yes  Onset: at an unknown time      Office Note     Name: Kingsley Grimm    : 1995     MRN: [id number]     Chief Complaint  Schizoaffective disorder, depressive type (Has been able to talk more, not sure if any of the swelling is better as she is currently menstruating ); Loss of Vision; Painful bowel movements (Irregular, tried dulcolax and it helped a little); and Dizziness    Subjective     History of Present Illness:  Kingsley Grimm is a 29 y.o. female who presents today for follow-up.    [facility].  She is currently taking Lamictal, risperidone, and Caplyta and doing well with this regimen.  She is currently living primarily with family in [place] and does visit her daughter on .  She notes that the medication changes seem to be helping and she is having improvement of symptoms.  She does notes that one of her medical providers recommended a course of occupational therapy to help with daily tasks processing.    Patient notes that she has been struggling with constipation.  She has taken Dulcolax with minimal success.    Patient notes that over the past year she has had symptoms of presyncope.  She notes that she will feel lightheaded at times with changing positions.  She also notes that she has some peripheral edema and it seems to have persisted since her delivery.  She has  that she has been working to get more active and will walk 2 to 3 miles a day will have some mild swelling at the end of this.    Review of Systems:   Review of Systems   Constitutional:  Positive for diaphoresis and fatigue. Negative for chills and fever.   HENT:  Positive for congestion. Negative for sore throat and swollen glands.    Respiratory:  Negative for cough.    Cardiovascular:  Negative for chest pain.   Gastrointestinal:  Negative for abdominal pain, nausea and vomiting.   Genitourinary:  Positive for dysuria.   Musculoskeletal:  Negative for myalgias and neck pain.   Skin:  Positive for rash.   Neurological:  Positive for weakness and numbness.   All other systems reviewed and are negative.      Past Medical History:   Past Medical History:   Diagnosis Date    Anemia     Was found during last pregnancy    Anxiety     Depression     Eating disorder     History of drug overdose 2021    intentional, with ibuprofen    History of suicidal ideation 2022    with paranoia    Psychosis     SAB (spontaneous )     Urinary tract infection     Visual impairment        Past Surgical History:   Past Surgical History:   Procedure Laterality Date    D & C WITH SUCTION  10/09/23    DILATATION AND EVACUATION  10/2023    MULTIPLE TOOTH EXTRACTIONS  2022    impacted teeth numbers 1, 17,and 32.; Surgical extraction soft tissue impaction # 16    WISDOM TOOTH EXTRACTION  Not sure       Family History:   Family History   Problem Relation Age of Onset    Anxiety disorder Mother     Depression Mother     Mental illness Mother     Anxiety disorder Father     Depression Father     Hyperlipidemia Father     Cancer Maternal Grandfather     Alcohol abuse Maternal Grandmother     Anxiety disorder Sister     Depression Sister         half-sister    Depression Paternal Aunt     Cancer Paternal Grandfather         Lung, prostate    Breast cancer Neg Hx     Ovarian cancer Neg Hx     Uterine cancer Neg Hx      "Colon cancer Neg Hx        Social History:   Social History     Socioeconomic History    Marital status: Single   Tobacco Use    Smoking status: Former     Current packs/day: 0.00     Average packs/day: 0.3 packs/day for 1 year (0.3 ttl pk-yrs)     Types: Cigarettes     Start date:      Quit date:      Years since quittin.1    Smokeless tobacco: Never    Tobacco comments:     Former vape user   Vaping Use    Vaping status: Former    Substances: Nicotine   Substance and Sexual Activity    Alcohol use: Not Currently    Drug use: Never    Sexual activity: Yes     Partners: Male     Birth control/protection: None, Birth control pill     Comment: Currently pregnant       Immunizations:   Immunization History   Administered Date(s) Administered    ABRYSVO (RSV, 60+ or pregnant women 32-36 wks) 2024    COVID-19 (PFIZER) Purple Cap Monovalent 12/10/2021    Covid-19 (Pfizer) Gray Cap Monovalent 2022    Fluzone  >6mos 2024    Fluzone Quad >6mos (Multi-dose) 12/10/2021    Influenza, Unspecified 12/10/2021    Tdap 2024    flucelvax quad pfs =>4 YRS 2018        Medications:     Current Outpatient Medications:     Caplyta 42 MG capsule, Take 1 capsule by mouth every night at bedtime., Disp: , Rfl:     lamoTRIgine (LaMICtal) 100 MG tablet, Take 1 tablet by mouth Daily., Disp: 30 tablet, Rfl: 5    norgestimate-ethinyl estradiol (Sprintec 28) 0.25-35 MG-MCG per tablet, Take 1 tablet by mouth Daily., Disp: 84 tablet, Rfl: 3    risperiDONE (risperDAL) 1 MG tablet, Take 1 tablet by mouth 3 times a day., Disp: , Rfl:     Allergies:   No Known Allergies    Objective     Vital Signs  /70 (BP Location: Right arm, Patient Position: Sitting, Cuff Size: Adult)   Pulse 82   Temp 98.2 °F (36.8 °C) (Oral)   Ht 167.6 cm (65.98\")   Wt 61.1 kg (134 lb 9.6 oz)   SpO2 98%   BMI 21.74 kg/m²   Estimated body mass index is 21.74 kg/m² as calculated from the following:    Height as of this encounter: " "167.6 cm (65.98\").    Weight as of this encounter: 61.1 kg (134 lb 9.6 oz).    BMI is within normal parameters. No other follow-up for BMI required.       Physical Exam  Vitals and nursing note reviewed.   Constitutional:       Appearance: Normal appearance. She is normal weight.   HENT:      Head: Normocephalic and atraumatic.      Nose: Nose normal.      Mouth/Throat:      Mouth: Mucous membranes are moist.   Eyes:      Extraocular Movements: Extraocular movements intact.      Pupils: Pupils are equal, round, and reactive to light.   Cardiovascular:      Rate and Rhythm: Normal rate.   Pulmonary:      Effort: Pulmonary effort is normal.   Abdominal:      General: Abdomen is flat.   Musculoskeletal:         General: Normal range of motion.      Cervical back: Normal range of motion.   Skin:     General: Skin is warm.   Neurological:      General: No focal deficit present.      Mental Status: She is alert and oriented to person, place, and time.   Psychiatric:         Mood and Affect: Mood normal.         Behavior: Behavior normal.         Thought Content: Thought content normal.         Judgment: Judgment normal.         Procedures     Results:  No results found for this or any previous visit (from the past 24 hours).     Assessment and Plan     Assessment/Plan:  Diagnoses and all orders for this visit:    1. Schizoaffective disorder, depressive type (Primary)  Assessment & Plan:  Patient's depression is a recurrent episode that is moderate without psychosis. Depression is active and improving with treatment.    Plan:   Continue current medication therapy     Followup in 4 weeks.       2. Orthostatic hypotension  Assessment & Plan:  Discussed fluid hydration, compression socks.  Orthostatic blood pressures completed in office today.  Will place referral to cardiology for further evaluation.    Orders:  -     Ambulatory Referral to Cardiology    3. Chronic idiopathic constipation  Assessment & Plan:  Potentially " medication induced.  Discussed bowel regimen, increasing fluids, and MiraLAX daily          Follow Up  Return in about 4 weeks (around 4/3/2025) for Recheck.    Charlene Meneses DO   [facility]

## 2025-03-20 ENCOUNTER — OFFICE VISIT (OUTPATIENT)
Dept: FAMILY MEDICINE CLINIC | Facility: CLINIC | Age: 30
End: 2025-03-20
Payer: MEDICAID

## 2025-03-20 VITALS — HEIGHT: 66 IN | BODY MASS INDEX: 20.34 KG/M2 | WEIGHT: 126.6 LBS

## 2025-03-20 DIAGNOSIS — F25.1 SCHIZOAFFECTIVE DISORDER, DEPRESSIVE TYPE: Primary | ICD-10-CM

## 2025-03-20 PROCEDURE — 1160F RVW MEDS BY RX/DR IN RCRD: CPT | Performed by: FAMILY MEDICINE

## 2025-03-20 PROCEDURE — 99213 OFFICE O/P EST LOW 20 MIN: CPT | Performed by: FAMILY MEDICINE

## 2025-03-20 PROCEDURE — 1159F MED LIST DOCD IN RCRD: CPT | Performed by: FAMILY MEDICINE

## 2025-03-20 NOTE — ASSESSMENT & PLAN NOTE
Patient's depression is a recurrent episode that is moderate without psychosis. Depression is active and improving with treatment.    Plan:   Continue current medication therapy     Followup in 2 weeks.    Paperwork completed today.

## 2025-03-20 NOTE — PROGRESS NOTES
Office Note     Name: Kingsley Grimm    : 1995     MRN: [id number]     Chief Complaint  Documentation  (Needing paperwork signed by pcp)    Subjective     History of Present Illness:  Kingsley Grimm is a 29 y.o. female who presents today for paperwork.    Patient presents today with Teri to complete further paperwork for court.  She does have the paperwork needs to completed by lc which is available in office today.  She is currently living in [place] with her family and notes that things are slowly and steadily improving.  She has been compliant with her medication regimen and psychiatric follow-ups and overall states she is doing well.    Review of Systems:   Review of Systems   All other systems reviewed and are negative.      Past Medical History:   Past Medical History:   Diagnosis Date    Anemia     Was found during last pregnancy    Anxiety     Depression     Eating disorder     History of drug overdose 2021    intentional, with ibuprofen    History of suicidal ideation 2022    with paranoia    Psychosis     SAB (spontaneous )     Urinary tract infection     Visual impairment        Past Surgical History:   Past Surgical History:   Procedure Laterality Date    D & C WITH SUCTION  10/09/23    DILATATION AND EVACUATION  10/2023    MULTIPLE TOOTH EXTRACTIONS  2022    impacted teeth numbers 1, 17,and 32.; Surgical extraction soft tissue impaction # 16    WISDOM TOOTH EXTRACTION  Not sure       Family History:   Family History   Problem Relation Age of Onset    Anxiety disorder Mother     Depression Mother     Mental illness Mother     Anxiety disorder Father     Depression Father     Hyperlipidemia Father     Cancer Maternal Grandfather     Alcohol abuse Maternal Grandmother     Anxiety disorder Sister     Depression Sister         half-sister    Depression Paternal Aunt     Cancer Paternal Grandfather         Lung, prostate    Breast cancer Neg Hx     Ovarian  "cancer Neg Hx     Uterine cancer Neg Hx     Colon cancer Neg Hx        Social History:   Social History     Socioeconomic History    Marital status: Single   Tobacco Use    Smoking status: Former     Current packs/day: 0.00     Average packs/day: 0.3 packs/day for 1 year (0.3 ttl pk-yrs)     Types: Cigarettes     Start date:      Quit date:      Years since quittin.2     Passive exposure: Past    Smokeless tobacco: Never    Tobacco comments:     Former vape user   Vaping Use    Vaping status: Former    Substances: Nicotine   Substance and Sexual Activity    Alcohol use: Not Currently    Drug use: Never    Sexual activity: Yes     Partners: Male     Birth control/protection: None, Birth control pill     Comment: Currently pregnant       Immunizations:   Immunization History   Administered Date(s) Administered    ABRYSVO (RSV, 60+ or pregnant women 32-36 wks) 2024    COVID-19 (PFIZER) Purple Cap Monovalent 12/10/2021    Covid-19 (Pfizer) Gray Cap Monovalent 2022    Fluzone  >6mos 2024    Fluzone Quad >6mos (Multi-dose) 12/10/2021    Influenza, Unspecified 12/10/2021    Tdap 2024    flucelvax quad pfs =>4 YRS 2018        Medications:     Current Outpatient Medications:     Caplyta 42 MG capsule, Take 1 capsule by mouth every night at bedtime., Disp: , Rfl:     lamoTRIgine (LaMICtal) 100 MG tablet, Take 1 tablet by mouth Daily., Disp: 30 tablet, Rfl: 5    norgestimate-ethinyl estradiol (Sprintec 28) 0.25-35 MG-MCG per tablet, Take 1 tablet by mouth Daily., Disp: 84 tablet, Rfl: 3    risperiDONE (risperDAL) 1 MG tablet, Take 1 tablet by mouth 3 times a day., Disp: , Rfl:     Allergies:   No Known Allergies    Objective     Vital Signs  Ht 167.6 cm (65.98\")   Wt 57.4 kg (126 lb 9.6 oz)   BMI 20.44 kg/m²   Estimated body mass index is 20.44 kg/m² as calculated from the following:    Height as of this encounter: 167.6 cm (65.98\").    Weight as of this encounter: 57.4 kg (126 lb " 9.6 oz).    BMI is within normal parameters. No other follow-up for BMI required.       Physical Exam  Vitals and nursing note reviewed.   Constitutional:       Appearance: Normal appearance. She is normal weight.   HENT:      Head: Normocephalic and atraumatic.      Nose: Nose normal.      Mouth/Throat:      Mouth: Mucous membranes are moist.   Eyes:      Extraocular Movements: Extraocular movements intact.      Pupils: Pupils are equal, round, and reactive to light.   Cardiovascular:      Rate and Rhythm: Normal rate.   Pulmonary:      Effort: Pulmonary effort is normal.   Abdominal:      General: Abdomen is flat.   Musculoskeletal:         General: Normal range of motion.      Cervical back: Normal range of motion.   Skin:     General: Skin is warm.   Neurological:      General: No focal deficit present.      Mental Status: She is alert and oriented to person, place, and time.   Psychiatric:         Mood and Affect: Mood normal.         Behavior: Behavior normal.         Thought Content: Thought content normal.         Judgment: Judgment normal.            Procedures     Results:  No results found for this or any previous visit (from the past 24 hours).     Assessment and Plan     Assessment/Plan:  Diagnoses and all orders for this visit:    1. Schizoaffective disorder, depressive type (Primary)  Assessment & Plan:  Patient's depression is a recurrent episode that is moderate without psychosis. Depression is active and improving with treatment.    Plan:   Continue current medication therapy     Followup in 2 weeks.    Paperwork completed today.          Follow Up  Return in about 2 weeks (around 4/3/2025) for Recheck.    Charlene Meneses DO   [facility]

## 2025-04-04 ENCOUNTER — TELEMEDICINE (OUTPATIENT)
Dept: FAMILY MEDICINE CLINIC | Facility: CLINIC | Age: 30
End: 2025-04-04
Payer: MEDICAID

## 2025-04-04 DIAGNOSIS — F41.1 GENERALIZED ANXIETY DISORDER: ICD-10-CM

## 2025-04-04 DIAGNOSIS — F25.1 SCHIZOAFFECTIVE DISORDER, DEPRESSIVE TYPE: Primary | ICD-10-CM

## 2025-04-04 PROBLEM — N89.8 VAGINAL IRRITATION: Status: RESOLVED | Noted: 2025-02-03 | Resolved: 2025-04-04

## 2025-04-04 PROCEDURE — 99213 OFFICE O/P EST LOW 20 MIN: CPT | Performed by: FAMILY MEDICINE

## 2025-04-04 RX ORDER — HYDROXYZINE HYDROCHLORIDE 50 MG/1
50 TABLET, FILM COATED ORAL EVERY 8 HOURS PRN
COMMUNITY
Start: 2025-03-27

## 2025-04-04 RX ORDER — BUSPIRONE HYDROCHLORIDE 5 MG/1
5 TABLET ORAL 2 TIMES DAILY
COMMUNITY
Start: 2025-03-27

## 2025-04-04 NOTE — PROGRESS NOTES
Telehealth E-Visit      Patient Name: Kingsley Grimm  : 1995   MRN: [id number]     Chief Complaint: Mental health follow-up      I have reviewed the E-Visit questionnaire and the patient's answers, my assessment and plan are listed below.     Patient was seen today through synchronous audio/video technology. Verbal consent was obtained. The patient was located at home. Charlene Meneses DO was located at [facility] in [place], [place]. Vitals signs were not obtained due to lack of home monitoring access. Time spent with patient was 10 minutes.     History of Present Illness: Kingsley Grimm is a 29 y.o. female who presents with FU.     [facility]. She is currently taking Lamictal, Caplyta, BuSpar, and hydroxyzine as needed and doing well with this regimen.  She notes that the addition of medicine for anxiety has greatly helped her and she is doing much better in social settings.  She is currently living primarily with family in [place] and does visit her daughter on . She notes that the medication changes seem to be helping and she is having improvement of symptoms. She does notes that one of her medical providers recommended a course of occupational therapy to help with daily tasks processing.  She met with her psychiatrist earlier this week and has a follow-up scheduled for next week.        Subjective      Review of Systems:   Review of Systems   Psychiatric/Behavioral:  The patient is nervous/anxious.    All other systems reviewed and are negative.      I have reviewed and the following portions of the patient's history were updated as appropriate: past family history, past medical history, past social history, past surgical history and problem list.    Medications:     Current Outpatient Medications:     busPIRone (BUSPAR) 5 MG tablet, Take 1 tablet by mouth 2 (Two) Times a Day., Disp: , Rfl:     hydrOXYzine  (ATARAX) 50 MG tablet, Take 1 tablet by mouth Every 8 (Eight) Hours As Needed for Anxiety., Disp: , Rfl:     Caplyta 42 MG capsule, Take 1 capsule by mouth every night at bedtime., Disp: , Rfl:     lamoTRIgine (LaMICtal) 100 MG tablet, Take 1 tablet by mouth Daily., Disp: 30 tablet, Rfl: 5    norgestimate-ethinyl estradiol (Sprintec 28) 0.25-35 MG-MCG per tablet, Take 1 tablet by mouth Daily., Disp: 84 tablet, Rfl: 3    Allergies:   No Known Allergies    Objective     Physical Exam:  Vital Signs: There were no vitals filed for this visit.  BMI is within normal parameters. No other follow-up for BMI required.       Physical Exam  Constitutional:       Appearance: Normal appearance. She is normal weight.   HENT:      Head: Normocephalic and atraumatic.      Nose: Nose normal.      Mouth/Throat:      Mouth: Mucous membranes are moist.   Eyes:      Extraocular Movements: Extraocular movements intact.      Pupils: Pupils are equal, round, and reactive to light.   Pulmonary:      Effort: Pulmonary effort is normal.   Abdominal:      General: Abdomen is flat.   Musculoskeletal:         General: Normal range of motion.      Cervical back: Normal range of motion.   Neurological:      General: No focal deficit present.      Mental Status: She is alert and oriented to person, place, and time.   Psychiatric:         Mood and Affect: Mood normal.         Behavior: Behavior normal.         Thought Content: Thought content normal.         Judgment: Judgment normal.           Assessment / Plan      Assessment/Plan:   Diagnoses and all orders for this visit:    1. Schizoaffective disorder, depressive type (Primary)  Assessment & Plan:  Patient's depression is a recurrent episode that is moderate without psychosis. Depression is active and improving with treatment.    Plan:   Continue current medication therapy   Referral to psychology  Referral to psychiatry    Followup in 4 weeks.       2. Generalized anxiety disorder  Assessment &  Plan:  Psychological condition is improving with treatment.  Continue current treatment regimen.  Referral to psychological counseling.  Referral to psychiatry.  Psychological condition  will be reassessed in 4 weeks.          Follow Up:   Return in about 4 weeks (around 5/2/2025) for Video visit.    Any medications prescribed have been sent electronically to   [facility] [address] [place] [address] - [address] [place] [address] - [phone]  - [phone]   [address] [address]  Phone: [phone] Fax: [phone]    [facility] [place]  [address]  [address]   [place] [address]  Phone: [phone] Fax: [phone]      25 minutes were spent reviewing the patient's questionnaire, formulating a treatment plan, and relaying information to the patient via [employer].    Charlene Meneses DO   [facility] [address]  04/04/25  16:04 EDT;e

## 2025-04-04 NOTE — ASSESSMENT & PLAN NOTE
Psychological condition is improving with treatment.  Continue current treatment regimen.  Referral to psychological counseling.  Referral to psychiatry.  Psychological condition  will be reassessed in 4 weeks.

## 2025-04-04 NOTE — ASSESSMENT & PLAN NOTE
Patient's depression is a recurrent episode that is moderate without psychosis. Depression is active and improving with treatment.    Plan:   Continue current medication therapy   Referral to psychology  Referral to psychiatry    Followup in 4 weeks.